# Patient Record
Sex: MALE | Race: BLACK OR AFRICAN AMERICAN | NOT HISPANIC OR LATINO | Employment: FULL TIME | URBAN - METROPOLITAN AREA
[De-identification: names, ages, dates, MRNs, and addresses within clinical notes are randomized per-mention and may not be internally consistent; named-entity substitution may affect disease eponyms.]

---

## 2018-03-05 ENCOUNTER — OFFICE VISIT (OUTPATIENT)
Dept: UROLOGY | Facility: CLINIC | Age: 68
End: 2018-03-05
Payer: MEDICARE

## 2018-03-05 VITALS
SYSTOLIC BLOOD PRESSURE: 160 MMHG | BODY MASS INDEX: 36.54 KG/M2 | WEIGHT: 261 LBS | DIASTOLIC BLOOD PRESSURE: 80 MMHG | HEIGHT: 71 IN

## 2018-03-05 DIAGNOSIS — C61 PROSTATE CANCER: Primary | ICD-10-CM

## 2018-03-05 LAB
BILIRUB SERPL-MCNC: ABNORMAL MG/DL
BLOOD URINE, POC: ABNORMAL
COLOR, POC UA: YELLOW
GLUCOSE UR QL STRIP: ABNORMAL
KETONES UR QL STRIP: ABNORMAL
LEUKOCYTE ESTERASE URINE, POC: ABNORMAL
NITRITE, POC UA: ABNORMAL
PH, POC UA: 6
PROTEIN, POC: ABNORMAL
SPECIFIC GRAVITY, POC UA: 1.01
UROBILINOGEN, POC UA: ABNORMAL

## 2018-03-05 PROCEDURE — 99204 OFFICE O/P NEW MOD 45 MIN: CPT | Mod: S$GLB,,, | Performed by: NURSE PRACTITIONER

## 2018-03-05 PROCEDURE — 81002 URINALYSIS NONAUTO W/O SCOPE: CPT | Mod: S$GLB,,, | Performed by: NURSE PRACTITIONER

## 2018-03-05 RX ORDER — MELOXICAM 15 MG/1
TABLET ORAL
COMMUNITY
Start: 2018-01-29

## 2018-03-05 RX ORDER — AMLODIPINE BESYLATE 5 MG/1
TABLET ORAL
COMMUNITY
Start: 2018-02-25

## 2018-03-05 RX ORDER — BENAZEPRIL HYDROCHLORIDE 40 MG/1
TABLET ORAL
COMMUNITY
Start: 2018-02-25

## 2018-03-05 NOTE — PROGRESS NOTES
"Subjective:      Deep Farah is a 67 y.o. male who was self-referred for evaluation of his elevated PSA.      Previous patient of Dr. Edmondson. The patient states that he was diagnosed with prostate cancer in 2004 and had his prostate removed followed by radiation. PSA is now 4.6. Denies symptoms of metastatic disease, but does report right knee pain (seeing ortho). Reports urinary urgency and daytime frequency but denies nocturia. Strong family history of prostate cancer.     Upon further search, limited records were found in "legacy documents." It appears the patient had Paulo 2+2 in 1/6 cores (PSA 18.6 at time of bx) with brachytherapy followed by XRT. MRI and bone scan in 2004 were negative.     The following portions of the patient's history were reviewed and updated as appropriate: allergies, current medications, past family history, past medical history, past social history, past surgical history and problem list.    Review of Systems  Constitutional: no fever or chills  ENT: no nasal congestion or sore throat  Respiratory: no cough or shortness of breath  Cardiovascular: no chest pain or palpitations  Gastrointestinal: no nausea or vomiting, tolerating diet  Genitourinary: as per HPI  Hematologic/Lymphatic: no easy bruising or lymphadenopathy  Musculoskeletal: no arthralgias or myalgias  Neurological: no seizures or tremors  Behavioral/Psych: no auditory or visual hallucinations     Objective:   Vitals: BP (!) 160/80 (BP Location: Left arm, Patient Position: Sitting, BP Method: X-Large (Automatic))   Ht 5' 11" (1.803 m)   Wt 118.4 kg (261 lb)   BMI 36.40 kg/m²     Physical Exam   General: alert and oriented, no acute distress  Head: normocephalic, atraumatic  Neck: supple, no lymphadenopathy, normal ROM, no masses  Respiratory: Symmetric expansion, non-labored breathing  Cardiovascular: regular rate and rhythm, nomal pulses, no peripheral edema  Abdomen: soft, non tender, non distended, no palpable " masses, no hernias, no hepatomegaly or splenomegaly  Genitourinary: note examined   Lymphatic: no inguinal nodes  Skin: normal coloration and turgor, no rashes, no suspicious skin lesions noted  Neuro: alert and oriented x3, no gross deficits  Psych: normal judgment and insight, normal mood/affect and non-anxious    Lab Review   Urinalysis demonstrates positive for red blood cells (5-10 dannie/mL), protein (trace)  Lab Results   Component Value Date    WBC 5.99 08/09/2015    HGB 11.6 (L) 08/09/2015    HCT 36.4 (L) 08/09/2015    MCV 85 08/09/2015     08/09/2015     Lab Results   Component Value Date    CREATININE 1.29 08/09/2015    BUN 24 (H) 08/09/2015     No results found for: PSA  Imaging   None    Assessment:     1. Prostate cancer      Plan:   Deep was seen today for new patient.    Diagnoses and all orders for this visit:    Prostate cancer  -     Basic metabolic panel; Future  -     NM Bone Scan Whole Body; Future  -     CT Abdomen Pelvis W Wo Contrast; Future    Plan:  --Bone scan and CT abdomen/pelvis  --Follow up with Dr. Calix or Dr. Panchal following imaging to determine plan of care

## 2022-01-10 ENCOUNTER — LAB VISIT (OUTPATIENT)
Dept: PRIMARY CARE CLINIC | Facility: OTHER | Age: 72
End: 2022-01-10
Attending: INTERNAL MEDICINE

## 2022-01-10 DIAGNOSIS — U07.1 COVID-19: Primary | ICD-10-CM

## 2022-01-10 LAB
CTP QC/QA: YES
SARS-COV-2 AG RESP QL IA.RAPID: NEGATIVE

## 2022-01-10 PROCEDURE — 87811 SARS-COV-2 COVID19 W/OPTIC: CPT

## 2022-01-10 NOTE — PROGRESS NOTES
Instructions for Patients with Confirmed or Suspected COVID-19    If you are awaiting your test result, you will either be called or it will be released to the patient portal.  If you have any questions about your test, please visit [email or web address] or call our COVID-19 information line at [phone].      Please isolate yourself at home.  You may leave home and/or return to work once the following conditions are met:    If you have symptoms and tested positive:   More than 5 days since symptoms first appeared AND   More than 24 hours fever free without medications AND       symptoms have improved   · For five days after ending isolation, masks are required.    If you had no symptoms but tested positive:   More than 5 days since the date of the first positive test. If you develop symptoms, then use the guidelines above  · For five days after ending isolation, masks are required.      Testing is not recommended if you are symptom free after completing isolation.

## 2023-07-01 ENCOUNTER — HOSPITAL ENCOUNTER (EMERGENCY)
Facility: HOSPITAL | Age: 73
Discharge: HOME | End: 2023-07-01
Attending: FAMILY MEDICINE
Payer: MEDICARE

## 2023-07-01 VITALS
OXYGEN SATURATION: 99 % | SYSTOLIC BLOOD PRESSURE: 195 MMHG | HEART RATE: 87 BPM | TEMPERATURE: 98 F | DIASTOLIC BLOOD PRESSURE: 97 MMHG | RESPIRATION RATE: 18 BRPM

## 2023-07-01 DIAGNOSIS — M10.9 GOUT, UNSPECIFIED CAUSE, UNSPECIFIED CHRONICITY, UNSPECIFIED SITE: ICD-10-CM

## 2023-07-01 DIAGNOSIS — R52 PAIN: ICD-10-CM

## 2023-07-01 DIAGNOSIS — M79.89 SWELLING OF LEFT HAND: Primary | ICD-10-CM

## 2023-07-01 LAB
ALBUMIN SERPL BCP-MCNC: 4 G/DL (ref 3.5–5.2)
ALP SERPL-CCNC: 81 U/L (ref 38–126)
ALT SERPL W/O P-5'-P-CCNC: 34 U/L (ref 10–44)
ANION GAP SERPL CALC-SCNC: 8 MMOL/L (ref 8–16)
AST SERPL-CCNC: 36 U/L (ref 15–46)
BASOPHILS # BLD AUTO: 0.03 K/UL (ref 0–0.2)
BASOPHILS NFR BLD: 0.3 % (ref 0–1.9)
BILIRUB SERPL-MCNC: 0.8 MG/DL (ref 0.1–1)
CALCIUM SERPL-MCNC: 8.9 MG/DL (ref 8.7–10.5)
CHLORIDE SERPL-SCNC: 103 MMOL/L (ref 95–110)
CO2 SERPL-SCNC: 29 MMOL/L (ref 23–29)
CREAT SERPL-MCNC: 1.35 MG/DL (ref 0.5–1.4)
DIFFERENTIAL METHOD: ABNORMAL
EOSINOPHIL # BLD AUTO: 0.1 K/UL (ref 0–0.5)
EOSINOPHIL NFR BLD: 1.4 % (ref 0–8)
ERYTHROCYTE [DISTWIDTH] IN BLOOD BY AUTOMATED COUNT: 12.4 % (ref 11.5–14.5)
EST. GFR  (NO RACE VARIABLE): 55.1 ML/MIN/1.73 M^2
GLUCOSE SERPL-MCNC: 92 MG/DL (ref 70–110)
HCT VFR BLD AUTO: 31.1 % (ref 40–54)
HGB BLD-MCNC: 9.9 G/DL (ref 14–18)
IMM GRANULOCYTES # BLD AUTO: 0.03 K/UL (ref 0–0.04)
IMM GRANULOCYTES NFR BLD AUTO: 0.3 % (ref 0–0.5)
LYMPHOCYTES # BLD AUTO: 0.9 K/UL (ref 1–4.8)
LYMPHOCYTES NFR BLD: 8.9 % (ref 18–48)
MCH RBC QN AUTO: 27.8 PG (ref 27–31)
MCHC RBC AUTO-ENTMCNC: 31.8 G/DL (ref 32–36)
MCV RBC AUTO: 87 FL (ref 82–98)
MONOCYTES # BLD AUTO: 1.4 K/UL (ref 0.3–1)
MONOCYTES NFR BLD: 14.2 % (ref 4–15)
NEUTROPHILS # BLD AUTO: 7.4 K/UL (ref 1.8–7.7)
NEUTROPHILS NFR BLD: 74.9 % (ref 38–73)
NRBC BLD-RTO: 0 /100 WBC
PLATELET # BLD AUTO: 167 K/UL (ref 150–450)
PMV BLD AUTO: 11.7 FL (ref 9.2–12.9)
POTASSIUM SERPL-SCNC: 3.9 MMOL/L (ref 3.5–5.1)
PROT SERPL-MCNC: 7.7 G/DL (ref 6–8.4)
RBC # BLD AUTO: 3.56 M/UL (ref 4.6–6.2)
SODIUM SERPL-SCNC: 140 MMOL/L (ref 136–145)
URATE SERPL-MCNC: 7.7 MG/DL (ref 3.4–7)
UUN UR-MCNC: 23 MG/DL (ref 2–20)
WBC # BLD AUTO: 9.84 K/UL (ref 3.9–12.7)

## 2023-07-01 PROCEDURE — 63600175 PHARM REV CODE 636 W HCPCS: Mod: ER | Performed by: FAMILY MEDICINE

## 2023-07-01 PROCEDURE — 80053 COMPREHEN METABOLIC PANEL: CPT | Mod: ER | Performed by: FAMILY MEDICINE

## 2023-07-01 PROCEDURE — 99285 EMERGENCY DEPT VISIT HI MDM: CPT | Mod: 25,ER

## 2023-07-01 PROCEDURE — 96372 THER/PROPH/DIAG INJ SC/IM: CPT | Performed by: FAMILY MEDICINE

## 2023-07-01 PROCEDURE — 25000003 PHARM REV CODE 250: Mod: ER | Performed by: FAMILY MEDICINE

## 2023-07-01 PROCEDURE — 84550 ASSAY OF BLOOD/URIC ACID: CPT | Performed by: FAMILY MEDICINE

## 2023-07-01 PROCEDURE — 85025 COMPLETE CBC W/AUTO DIFF WBC: CPT | Mod: ER | Performed by: FAMILY MEDICINE

## 2023-07-01 RX ORDER — DEXAMETHASONE SODIUM PHOSPHATE 4 MG/ML
4 INJECTION, SOLUTION INTRA-ARTICULAR; INTRALESIONAL; INTRAMUSCULAR; INTRAVENOUS; SOFT TISSUE
Status: COMPLETED | OUTPATIENT
Start: 2023-07-01 | End: 2023-07-01

## 2023-07-01 RX ORDER — HYDROCODONE BITARTRATE AND ACETAMINOPHEN 10; 325 MG/1; MG/1
1 TABLET ORAL
Status: COMPLETED | OUTPATIENT
Start: 2023-07-01 | End: 2023-07-01

## 2023-07-01 RX ORDER — DEXAMETHASONE SODIUM PHOSPHATE 4 MG/ML
4 INJECTION, SOLUTION INTRA-ARTICULAR; INTRALESIONAL; INTRAMUSCULAR; INTRAVENOUS; SOFT TISSUE
Status: DISCONTINUED | OUTPATIENT
Start: 2023-07-01 | End: 2023-07-01

## 2023-07-01 RX ORDER — HYDROCODONE BITARTRATE AND ACETAMINOPHEN 7.5; 325 MG/1; MG/1
1 TABLET ORAL EVERY 6 HOURS PRN
Qty: 12 TABLET | Refills: 0 | Status: SHIPPED | OUTPATIENT
Start: 2023-07-01

## 2023-07-01 RX ADMIN — HYDROCODONE BITARTRATE AND ACETAMINOPHEN 1 TABLET: 10; 325 TABLET ORAL at 03:07

## 2023-07-01 RX ADMIN — DEXAMETHASONE SODIUM PHOSPHATE 4 MG: 4 INJECTION, SOLUTION INTRA-ARTICULAR; INTRALESIONAL; INTRAMUSCULAR; INTRAVENOUS; SOFT TISSUE at 05:07

## 2023-07-01 NOTE — ED PROVIDER NOTES
Encounter Date: 7/1/2023       History     Chief Complaint   Patient presents with    Joint Swelling     I am having left hand swelling and pain when I woke up this morning. I had a defibrillator placed a week ago at [facility].      72-year-old male woke up with pain and swelling in his left wrist and continued to be swollen since yesterday.  He had defibrillator placed in his left chest.  And wears sling.  No chest pain or shortness of breath.  No swelling in elbow or upper chest area.  Dressing is clean with no bleeding or swelling.  Patient denies fever, cough or congestion.    The history is provided by the patient.   Review of patient's allergies indicates:   Allergen Reactions    Pcn [penicillins] Swelling     Past Medical History:   Diagnosis Date    Bell's palsy     Diverticulitis     Hypertension      Past Surgical History:   Procedure Laterality Date    CARDIAC DEFIBRILLATOR PLACEMENT      PROSTATE SURGERY       Family History   Problem Relation Age of Onset    Stroke Father     Hypertension Father     Hypertension Brother     Prostate cancer Brother     Prostate cancer Cousin      Social History     Tobacco Use    Smoking status: Former    Smokeless tobacco: Never   Substance Use Topics    Alcohol use: Yes     Comment: 2 beers in a week    Drug use: No     Review of Systems   Constitutional:  Negative for fever.   HENT:  Negative for sore throat.    Respiratory:  Negative for shortness of breath.    Cardiovascular:  Negative for chest pain.   Gastrointestinal:  Negative for nausea.   Genitourinary:  Negative for dysuria.   Musculoskeletal:  Negative for back pain.   Skin:  Negative for rash.   Neurological:  Negative for weakness.   Hematological:  Does not bruise/bleed easily.   All other systems reviewed and are negative.    Physical Exam     Initial Vitals [07/01/23 1341]   BP Pulse Resp Temp SpO2   (!) 176/81 92 16 99.1 °F (37.3 °C) 99 %      MAP       --         Physical Exam    Nursing note and vitals  reviewed.  Constitutional: Vital signs are normal. He appears well-developed and well-nourished. He is active. No distress.   HENT:   Head: Normocephalic.   Nose: Nose normal.   Mouth/Throat: Oropharynx is clear and moist and mucous membranes are normal.   Eyes: Conjunctivae, EOM and lids are normal.   Neck: Neck supple.   Normal range of motion.  Cardiovascular:  Normal rate, regular rhythm, S1 normal, S2 normal and normal heart sounds.           Pulmonary/Chest: Breath sounds normal. No respiratory distress. He has no wheezes. He has no rales.   Defibrillator placed in left chest with normal dressing.   Abdominal: Abdomen is soft. Bowel sounds are normal. He exhibits no distension. There is no abdominal tenderness. There is no rebound.   Musculoskeletal:         General: Normal range of motion.      Right upper arm: Normal.      Cervical back: Normal range of motion and neck supple.      Right lower leg: Normal.      Left lower leg: Normal.      Comments: Left wrist and hand swollen.  Tender at the wrist joint.    Normal ulnar and radial pulse.     Neurological: He is alert and oriented to person, place, and time. He has normal strength. GCS score is 15. GCS eye subscore is 4. GCS verbal subscore is 5. GCS motor subscore is 6.   Skin: Skin is warm. Capillary refill takes less than 2 seconds.   Psychiatric: He has a normal mood and affect. His speech is normal and behavior is normal. Thought content normal. Cognition and memory are normal.       ED Course   Procedures  Labs Reviewed   CBC W/ AUTO DIFFERENTIAL - Abnormal; Notable for the following components:       Result Value    RBC 3.56 (*)     Hemoglobin 9.9 (*)     Hematocrit 31.1 (*)     MCHC 31.8 (*)     Lymph # 0.9 (*)     Mono # 1.4 (*)     Gran % 74.9 (*)     Lymph % 8.9 (*)     All other components within normal limits   COMPREHENSIVE METABOLIC PANEL - Abnormal; Notable for the following components:    BUN 23 (*)     eGFR 55.1 (*)     All other components  within normal limits   URIC ACID - Abnormal; Notable for the following components:    Uric Acid 7.7 (*)     All other components within normal limits          Imaging Results              US Upper Extremity Veins Left (Final result)  Result time 07/01/23 15:55:50      Final result by Anita Patino MD (07/01/23 15:55:50)                   Impression:      Negative for DVT      Electronically signed by: Anita Patino  Date:    07/01/2023  Time:    15:55               Narrative:    EXAMINATION:  US UPPER EXTREMITY VEINS LEFT    CLINICAL HISTORY:  swelling;    TECHNIQUE:  Duplex and color flow Doppler evaluation and dynamic compression was performed of the left upper extremity veins.    COMPARISON:  None    FINDINGS:  Central veins: The internal jugular, subclavian, and axillary veins are patent and free of thrombus.    Arm veins: The brachial, basilic, and cephalic veins are patent and compressible.    Contralateral subclavian/internal jugular veins: The right subclavian and internal jugular veins are patent and free of thrombus.    Other findings: None.                                       X-Ray Hand 3 View Left (Final result)  Result time 07/01/23 14:12:15      Final result by VICKI Allen Sr., MD (07/01/23 14:12:15)                   Impression:      1. There is mild prominence of the soft tissue thickness dorsal and medial to the wrist.  There is mild prominence of the soft tissue thickness in the dorsum of the hand. This is characteristic of a cellulitis or soft tissue contusion.  2. There are findings characteristic of erosions in some of the carpal bones and the proximal portion of the 1st and 2nd metacarpals.  3. If additional imaging evaluation is clinically indicated, I recommend consideration of a nonemergent MRI examination of the left upper extremity without and with IV contrast.      Electronically signed by: Johnathan Allen MD  Date:    07/01/2023  Time:    14:12               Narrative:     EXAMINATION:  XR WRIST COMPLETE 3 VIEWS LEFT; XR HAND COMPLETE 3 VIEW LEFT    CLINICAL HISTORY:  Pain, unspecifiedpain;    COMPARISON:  None    FINDINGS:  There is no fracture. There is no dislocation.  There are findings characteristic of erosions in some of the carpal bones and the proximal portion of the 1st and 2nd metacarpals.  There is a mild amount of atherosclerosis.  There is mild prominence of the soft tissue thickness dorsal and medial to the wrist.  There is mild prominence of the soft tissue thickness in the dorsum of the hand.                                       X-Ray Wrist Complete Left (Final result)  Result time 07/01/23 14:12:15      Final result by VICKI Allen Sr., MD (07/01/23 14:12:15)                   Impression:      1. There is mild prominence of the soft tissue thickness dorsal and medial to the wrist.  There is mild prominence of the soft tissue thickness in the dorsum of the hand. This is characteristic of a cellulitis or soft tissue contusion.  2. There are findings characteristic of erosions in some of the carpal bones and the proximal portion of the 1st and 2nd metacarpals.  3. If additional imaging evaluation is clinically indicated, I recommend consideration of a nonemergent MRI examination of the left upper extremity without and with IV contrast.      Electronically signed by: Johnathan Allen MD  Date:    07/01/2023  Time:    14:12               Narrative:    EXAMINATION:  XR WRIST COMPLETE 3 VIEWS LEFT; XR HAND COMPLETE 3 VIEW LEFT    CLINICAL HISTORY:  Pain, unspecifiedpain;    COMPARISON:  None    FINDINGS:  There is no fracture. There is no dislocation.  There are findings characteristic of erosions in some of the carpal bones and the proximal portion of the 1st and 2nd metacarpals.  There is a mild amount of atherosclerosis.  There is mild prominence of the soft tissue thickness dorsal and medial to the wrist.  There is mild prominence of the soft tissue thickness in  the dorsum of the hand.                                       Medications   HYDROcodone-acetaminophen  mg per tablet 1 tablet (1 tablet Oral Given 7/1/23 1512)   dexAMETHasone injection 4 mg (4 mg Intramuscular Given 7/1/23 1711)     Medical Decision Making:   Initial Assessment:   Swelling to his left hand and wrist for last 2 days.  No injury.  Pain with movement of joints.  Ulnar and radial pulses normal.  Normal capillary refill.  Differential Diagnosis:   Dependent edema, arthritis, cellulitis, gout.  Independently Interpreted Test(s):   I have ordered and independently interpreted X-rays - see summary below.       <> Summary of X-Ray Reading(s): X-ray of hand and wrist with no acute fractures or dislocations.  Arthritis noted.  ED Management:  Patient is wearing sling and hanging his left hand out of the sling.  Possibility of dependent edema increased to hand.  Elevated uric acid possibility of gout.  Possibility of gouty arthritis.  Patient is treated with dexamethasone and hydrocodone.  NSAIDs contraindicated.  Continue hydrocodone for pain.- follow up with PCP for further evaluation and management.  Chronic hypertension asymptomatic.  Advised for prompt medication and medication management.  Low-salt diet and discuss with PCP.  Dressing to left chest wall status post pacemaker placement clean and dry.  No fever chills.  No shortness of breath.  No chest pains.  Follow-up cardiology as scheduled.  Advised to follow-up ED with any worsening pain or swelling in his left hand immediately.                          Clinical Impression:   Final diagnoses:  [R52] Pain  [M79.89] Swelling of left hand (Primary)  [M10.9] Gout, unspecified cause, unspecified chronicity, unspecified site        ED Disposition Condition    Discharge Stable          ED Prescriptions       Medication Sig Dispense Start Date End Date Auth. Provider    HYDROcodone-acetaminophen (NORCO) 7.5-325 mg per tablet Take 1 tablet by mouth  every 6 (six) hours as needed for Pain. 12 tablet 7/1/2023 -- Ilya Leal MD          Follow-up Information       Follow up With Specialties Details Why Contact Info    Jeferson Andujar MD Internal Medicine   [id number]  [facility] [address]  [phone]               Ilya Leal MD  07/03/23 0622

## 2024-06-20 ENCOUNTER — HOSPITAL ENCOUNTER (INPATIENT)
Facility: HOSPITAL | Age: 74
LOS: 5 days | Discharge: HOME HEALTH | DRG: 871 | End: 2024-06-25
Attending: EMERGENCY MEDICINE | Admitting: INTERNAL MEDICINE
Payer: MEDICARE

## 2024-06-20 DIAGNOSIS — M62.82 NON-TRAUMATIC RHABDOMYOLYSIS: ICD-10-CM

## 2024-06-20 DIAGNOSIS — I50.20 HFREF (HEART FAILURE WITH REDUCED EJECTION FRACTION): ICD-10-CM

## 2024-06-20 DIAGNOSIS — N17.9 ACUTE RENAL FAILURE DUE TO RHABDOMYOLYSIS: Primary | ICD-10-CM

## 2024-06-20 DIAGNOSIS — I50.22 CHRONIC SYSTOLIC CONGESTIVE HEART FAILURE, NYHA CLASS 2: ICD-10-CM

## 2024-06-20 DIAGNOSIS — R19.7 DIARRHEA, UNSPECIFIED TYPE: ICD-10-CM

## 2024-06-20 DIAGNOSIS — R00.0 TACHYCARDIA: ICD-10-CM

## 2024-06-20 DIAGNOSIS — E80.6 HYPERBILIRUBINEMIA: ICD-10-CM

## 2024-06-20 DIAGNOSIS — R73.9 HYPERGLYCEMIA: ICD-10-CM

## 2024-06-20 DIAGNOSIS — D64.9 NORMOCYTIC ANEMIA: ICD-10-CM

## 2024-06-20 DIAGNOSIS — I25.10 CORONARY ARTERY DISEASE INVOLVING NATIVE CORONARY ARTERY OF NATIVE HEART WITHOUT ANGINA PECTORIS: ICD-10-CM

## 2024-06-20 DIAGNOSIS — M62.82 ACUTE RENAL FAILURE DUE TO RHABDOMYOLYSIS: Primary | ICD-10-CM

## 2024-06-20 DIAGNOSIS — R07.9 CHEST PAIN: ICD-10-CM

## 2024-06-20 DIAGNOSIS — R50.9 FEVER, UNSPECIFIED FEVER CAUSE: ICD-10-CM

## 2024-06-20 DIAGNOSIS — I48.0 PAROXYSMAL ATRIAL FIBRILLATION: Chronic | ICD-10-CM

## 2024-06-20 DIAGNOSIS — E87.20 METABOLIC ACIDOSIS: ICD-10-CM

## 2024-06-20 DIAGNOSIS — N17.9 AKI (ACUTE KIDNEY INJURY): ICD-10-CM

## 2024-06-20 DIAGNOSIS — E03.9 HYPOTHYROIDISM, UNSPECIFIED TYPE: ICD-10-CM

## 2024-06-20 DIAGNOSIS — E86.0 DEHYDRATION: ICD-10-CM

## 2024-06-20 DIAGNOSIS — N18.31 CKD STAGE 3A, GFR 45-59 ML/MIN: ICD-10-CM

## 2024-06-20 DIAGNOSIS — J18.9 PNEUMONIA OF LEFT LOWER LOBE DUE TO INFECTIOUS ORGANISM: ICD-10-CM

## 2024-06-20 DIAGNOSIS — R74.01 TRANSAMINITIS: ICD-10-CM

## 2024-06-20 PROBLEM — M1A.9XX0 CHRONIC GOUT: Status: ACTIVE | Noted: 2024-06-20

## 2024-06-20 PROBLEM — E78.5 HYPERLIPIDEMIA: Status: ACTIVE | Noted: 2024-06-20

## 2024-06-20 PROBLEM — G51.0 LEFT-SIDED BELL'S PALSY: Status: ACTIVE | Noted: 2024-06-20

## 2024-06-20 PROBLEM — Z98.890 HISTORY OF CARDIAC RADIOFREQUENCY ABLATION: Chronic | Status: ACTIVE | Noted: 2023-04-24

## 2024-06-20 PROBLEM — I10 HYPERTENSION: Status: ACTIVE | Noted: 2024-06-20

## 2024-06-20 PROBLEM — Z79.01 CHRONIC ANTICOAGULATION: Chronic | Status: ACTIVE | Noted: 2023-04-24

## 2024-06-20 LAB
ALBUMIN SERPL BCP-MCNC: 3.2 G/DL (ref 3.5–5.2)
ALP SERPL-CCNC: 87 U/L (ref 55–135)
ALT SERPL W/O P-5'-P-CCNC: 126 U/L (ref 10–44)
ANION GAP SERPL CALC-SCNC: 15 MMOL/L (ref 8–16)
AST SERPL-CCNC: 433 U/L (ref 10–40)
BASOPHILS # BLD AUTO: 0.03 K/UL (ref 0–0.2)
BASOPHILS NFR BLD: 0.2 % (ref 0–1.9)
BILIRUB SERPL-MCNC: 1.6 MG/DL (ref 0.1–1)
BUN SERPL-MCNC: 36 MG/DL (ref 8–23)
CALCIUM SERPL-MCNC: 9.9 MG/DL (ref 8.7–10.5)
CHLORIDE SERPL-SCNC: 101 MMOL/L (ref 95–110)
CK SERPL-CCNC: ABNORMAL U/L (ref 20–200)
CO2 SERPL-SCNC: 21 MMOL/L (ref 23–29)
CREAT SERPL-MCNC: 2.8 MG/DL (ref 0.5–1.4)
DIFFERENTIAL METHOD BLD: ABNORMAL
EOSINOPHIL # BLD AUTO: 0.1 K/UL (ref 0–0.5)
EOSINOPHIL NFR BLD: 0.5 % (ref 0–8)
ERYTHROCYTE [DISTWIDTH] IN BLOOD BY AUTOMATED COUNT: 13.8 % (ref 11.5–14.5)
EST. GFR  (NO RACE VARIABLE): 23 ML/MIN/1.73 M^2
ESTIMATED AVG GLUCOSE: 97 MG/DL (ref 68–131)
FERRITIN SERPL-MCNC: 468 NG/ML (ref 20–300)
GLUCOSE SERPL-MCNC: 174 MG/DL (ref 70–110)
HBA1C MFR BLD: 5 % (ref 4–5.6)
HCT VFR BLD AUTO: 36.7 % (ref 40–54)
HGB BLD-MCNC: 11.9 G/DL (ref 14–18)
IMM GRANULOCYTES # BLD AUTO: 0.15 K/UL (ref 0–0.04)
IMM GRANULOCYTES NFR BLD AUTO: 0.8 % (ref 0–0.5)
LYMPHOCYTES # BLD AUTO: 0.4 K/UL (ref 1–4.8)
LYMPHOCYTES NFR BLD: 2.3 % (ref 18–48)
MCH RBC QN AUTO: 27.2 PG (ref 27–31)
MCHC RBC AUTO-ENTMCNC: 32.4 G/DL (ref 32–36)
MCV RBC AUTO: 84 FL (ref 82–98)
MONOCYTES # BLD AUTO: 1 K/UL (ref 0.3–1)
MONOCYTES NFR BLD: 4.9 % (ref 4–15)
NEUTROPHILS # BLD AUTO: 17.7 K/UL (ref 1.8–7.7)
NEUTROPHILS NFR BLD: 91.3 % (ref 38–73)
NRBC BLD-RTO: 0 /100 WBC
PLATELET # BLD AUTO: 194 K/UL (ref 150–450)
PMV BLD AUTO: 12.2 FL (ref 9.2–12.9)
POTASSIUM SERPL-SCNC: 3.8 MMOL/L (ref 3.5–5.1)
PROT SERPL-MCNC: 8.8 G/DL (ref 6–8.4)
RBC # BLD AUTO: 4.38 M/UL (ref 4.6–6.2)
SODIUM SERPL-SCNC: 137 MMOL/L (ref 136–145)
T4 FREE SERPL-MCNC: 1.1 NG/DL (ref 0.71–1.51)
TSH SERPL DL<=0.005 MIU/L-ACNC: 0.47 UIU/ML (ref 0.4–4)
WBC # BLD AUTO: 19.32 K/UL (ref 3.9–12.7)

## 2024-06-20 PROCEDURE — 96360 HYDRATION IV INFUSION INIT: CPT

## 2024-06-20 PROCEDURE — 82746 ASSAY OF FOLIC ACID SERUM: CPT | Performed by: STUDENT IN AN ORGANIZED HEALTH CARE EDUCATION/TRAINING PROGRAM

## 2024-06-20 PROCEDURE — 25000003 PHARM REV CODE 250: Performed by: EMERGENCY MEDICINE

## 2024-06-20 PROCEDURE — 99900035 HC TECH TIME PER 15 MIN (STAT)

## 2024-06-20 PROCEDURE — 84443 ASSAY THYROID STIM HORMONE: CPT | Performed by: STUDENT IN AN ORGANIZED HEALTH CARE EDUCATION/TRAINING PROGRAM

## 2024-06-20 PROCEDURE — 25000003 PHARM REV CODE 250: Performed by: STUDENT IN AN ORGANIZED HEALTH CARE EDUCATION/TRAINING PROGRAM

## 2024-06-20 PROCEDURE — 96361 HYDRATE IV INFUSION ADD-ON: CPT

## 2024-06-20 PROCEDURE — 84439 ASSAY OF FREE THYROXINE: CPT | Performed by: STUDENT IN AN ORGANIZED HEALTH CARE EDUCATION/TRAINING PROGRAM

## 2024-06-20 PROCEDURE — 99291 CRITICAL CARE FIRST HOUR: CPT | Mod: 25

## 2024-06-20 PROCEDURE — 63600175 PHARM REV CODE 636 W HCPCS: Performed by: STUDENT IN AN ORGANIZED HEALTH CARE EDUCATION/TRAINING PROGRAM

## 2024-06-20 PROCEDURE — 93005 ELECTROCARDIOGRAM TRACING: CPT

## 2024-06-20 PROCEDURE — 93010 ELECTROCARDIOGRAM REPORT: CPT | Mod: ,,, | Performed by: INTERNAL MEDICINE

## 2024-06-20 PROCEDURE — 82607 VITAMIN B-12: CPT | Performed by: STUDENT IN AN ORGANIZED HEALTH CARE EDUCATION/TRAINING PROGRAM

## 2024-06-20 PROCEDURE — 80053 COMPREHEN METABOLIC PANEL: CPT | Performed by: EMERGENCY MEDICINE

## 2024-06-20 PROCEDURE — 11000001 HC ACUTE MED/SURG PRIVATE ROOM

## 2024-06-20 PROCEDURE — 82550 ASSAY OF CK (CPK): CPT | Performed by: EMERGENCY MEDICINE

## 2024-06-20 PROCEDURE — 83540 ASSAY OF IRON: CPT | Performed by: STUDENT IN AN ORGANIZED HEALTH CARE EDUCATION/TRAINING PROGRAM

## 2024-06-20 PROCEDURE — 82728 ASSAY OF FERRITIN: CPT | Performed by: STUDENT IN AN ORGANIZED HEALTH CARE EDUCATION/TRAINING PROGRAM

## 2024-06-20 PROCEDURE — 85025 COMPLETE CBC W/AUTO DIFF WBC: CPT | Performed by: EMERGENCY MEDICINE

## 2024-06-20 PROCEDURE — 83036 HEMOGLOBIN GLYCOSYLATED A1C: CPT | Performed by: STUDENT IN AN ORGANIZED HEALTH CARE EDUCATION/TRAINING PROGRAM

## 2024-06-20 RX ORDER — LEVOTHYROXINE SODIUM 150 UG/1
150 TABLET ORAL
COMMUNITY
Start: 2024-05-12

## 2024-06-20 RX ORDER — VALSARTAN 320 MG/1
320 TABLET ORAL DAILY
Status: ON HOLD | COMMUNITY
Start: 2024-04-08 | End: 2024-06-24

## 2024-06-20 RX ORDER — SPIRONOLACTONE 25 MG/1
25 TABLET ORAL DAILY
Status: ON HOLD | COMMUNITY
Start: 2024-05-12 | End: 2024-06-25

## 2024-06-20 RX ORDER — NALOXONE HCL 0.4 MG/ML
0.02 VIAL (ML) INJECTION
Status: DISCONTINUED | OUTPATIENT
Start: 2024-06-20 | End: 2024-06-25 | Stop reason: HOSPADM

## 2024-06-20 RX ORDER — SODIUM CHLORIDE, SODIUM LACTATE, POTASSIUM CHLORIDE, CALCIUM CHLORIDE 600; 310; 30; 20 MG/100ML; MG/100ML; MG/100ML; MG/100ML
INJECTION, SOLUTION INTRAVENOUS CONTINUOUS
Status: ACTIVE | OUTPATIENT
Start: 2024-06-20 | End: 2024-06-23

## 2024-06-20 RX ORDER — NIFEDIPINE 30 MG/1
30 TABLET, EXTENDED RELEASE ORAL DAILY
Status: DISCONTINUED | OUTPATIENT
Start: 2024-06-20 | End: 2024-06-25 | Stop reason: HOSPADM

## 2024-06-20 RX ORDER — CARVEDILOL 6.25 MG/1
6.25 TABLET ORAL 2 TIMES DAILY
COMMUNITY
Start: 2024-04-08

## 2024-06-20 RX ORDER — APIXABAN 5 MG/1
5 TABLET, FILM COATED ORAL 2 TIMES DAILY
COMMUNITY

## 2024-06-20 RX ORDER — GLUCAGON 1 MG
1 KIT INJECTION
Status: DISCONTINUED | OUTPATIENT
Start: 2024-06-20 | End: 2024-06-25 | Stop reason: HOSPADM

## 2024-06-20 RX ORDER — TAMSULOSIN HYDROCHLORIDE 0.4 MG/1
0.4 CAPSULE ORAL DAILY
Status: DISCONTINUED | OUTPATIENT
Start: 2024-06-21 | End: 2024-06-20

## 2024-06-20 RX ORDER — IBUPROFEN 200 MG
16 TABLET ORAL
Status: DISCONTINUED | OUTPATIENT
Start: 2024-06-20 | End: 2024-06-25 | Stop reason: HOSPADM

## 2024-06-20 RX ORDER — LIDOCAINE 50 MG/G
1 PATCH TOPICAL DAILY PRN
Status: DISCONTINUED | OUTPATIENT
Start: 2024-06-20 | End: 2024-06-25 | Stop reason: HOSPADM

## 2024-06-20 RX ORDER — ASPIRIN 325 MG/1
1 TABLET, FILM COATED ORAL DAILY
COMMUNITY

## 2024-06-20 RX ORDER — ROSUVASTATIN CALCIUM 5 MG/1
5 TABLET, COATED ORAL DAILY
Status: ON HOLD | COMMUNITY
Start: 2024-05-12 | End: 2024-06-24

## 2024-06-20 RX ORDER — POTASSIUM CHLORIDE 750 MG/1
10 TABLET, EXTENDED RELEASE ORAL DAILY
COMMUNITY

## 2024-06-20 RX ORDER — CARVEDILOL 6.25 MG/1
6.25 TABLET ORAL 2 TIMES DAILY
Status: DISCONTINUED | OUTPATIENT
Start: 2024-06-20 | End: 2024-06-25 | Stop reason: HOSPADM

## 2024-06-20 RX ORDER — TAMSULOSIN HYDROCHLORIDE 0.4 MG/1
0.4 CAPSULE ORAL DAILY
Status: ON HOLD | COMMUNITY
End: 2024-06-24

## 2024-06-20 RX ORDER — SODIUM CHLORIDE 0.9 % (FLUSH) 0.9 %
10 SYRINGE (ML) INJECTION EVERY 12 HOURS PRN
Status: DISCONTINUED | OUTPATIENT
Start: 2024-06-20 | End: 2024-06-25 | Stop reason: HOSPADM

## 2024-06-20 RX ORDER — IBUPROFEN 200 MG
24 TABLET ORAL
Status: DISCONTINUED | OUTPATIENT
Start: 2024-06-20 | End: 2024-06-25 | Stop reason: HOSPADM

## 2024-06-20 RX ORDER — TIZANIDINE 2 MG/1
4 TABLET ORAL EVERY 8 HOURS PRN
Status: DISCONTINUED | OUTPATIENT
Start: 2024-06-20 | End: 2024-06-25 | Stop reason: HOSPADM

## 2024-06-20 RX ORDER — ACETAMINOPHEN 325 MG/1
650 TABLET ORAL EVERY 6 HOURS PRN
Status: DISCONTINUED | OUTPATIENT
Start: 2024-06-20 | End: 2024-06-21

## 2024-06-20 RX ORDER — PAROXETINE 10 MG/1
10 TABLET, FILM COATED ORAL EVERY MORNING
COMMUNITY

## 2024-06-20 RX ORDER — AMOXICILLIN 250 MG
1 CAPSULE ORAL NIGHTLY PRN
Status: DISCONTINUED | OUTPATIENT
Start: 2024-06-20 | End: 2024-06-25 | Stop reason: HOSPADM

## 2024-06-20 RX ORDER — OXYCODONE HYDROCHLORIDE 5 MG/1
5 TABLET ORAL EVERY 6 HOURS PRN
Status: DISCONTINUED | OUTPATIENT
Start: 2024-06-20 | End: 2024-06-25 | Stop reason: HOSPADM

## 2024-06-20 RX ORDER — PAROXETINE 10 MG/1
10 TABLET, FILM COATED ORAL EVERY MORNING
Status: DISCONTINUED | OUTPATIENT
Start: 2024-06-21 | End: 2024-06-25 | Stop reason: HOSPADM

## 2024-06-20 RX ORDER — OXYBUTYNIN CHLORIDE 10 MG/1
10 TABLET, EXTENDED RELEASE ORAL DAILY
Status: ON HOLD | COMMUNITY
Start: 2024-01-03 | End: 2024-06-24 | Stop reason: HOSPADM

## 2024-06-20 RX ORDER — POLYETHYLENE GLYCOL 3350 17 G/17G
17 POWDER, FOR SOLUTION ORAL DAILY PRN
Status: DISCONTINUED | OUTPATIENT
Start: 2024-06-20 | End: 2024-06-25 | Stop reason: HOSPADM

## 2024-06-20 RX ORDER — LEVOTHYROXINE SODIUM 75 UG/1
150 TABLET ORAL
Status: DISCONTINUED | OUTPATIENT
Start: 2024-06-21 | End: 2024-06-25 | Stop reason: HOSPADM

## 2024-06-20 RX ORDER — ALLOPURINOL 100 MG/1
100 TABLET ORAL DAILY
COMMUNITY
Start: 2024-05-14

## 2024-06-20 RX ORDER — TAMSULOSIN HYDROCHLORIDE 0.4 MG/1
0.4 CAPSULE ORAL NIGHTLY
Status: DISCONTINUED | OUTPATIENT
Start: 2024-06-20 | End: 2024-06-25 | Stop reason: HOSPADM

## 2024-06-20 RX ADMIN — SODIUM CHLORIDE 1000 ML: 9 INJECTION, SOLUTION INTRAVENOUS at 06:06

## 2024-06-20 RX ADMIN — APIXABAN 5 MG: 5 TABLET, FILM COATED ORAL at 09:06

## 2024-06-20 RX ADMIN — NIFEDIPINE 30 MG: 30 TABLET, FILM COATED, EXTENDED RELEASE ORAL at 09:06

## 2024-06-20 RX ADMIN — CARVEDILOL 6.25 MG: 6.25 TABLET, FILM COATED ORAL at 09:06

## 2024-06-20 RX ADMIN — TAMSULOSIN HYDROCHLORIDE 0.4 MG: 0.4 CAPSULE ORAL at 09:06

## 2024-06-20 RX ADMIN — SODIUM CHLORIDE, POTASSIUM CHLORIDE, SODIUM LACTATE AND CALCIUM CHLORIDE: 600; 310; 30; 20 INJECTION, SOLUTION INTRAVENOUS at 09:06

## 2024-06-20 NOTE — ED NOTES
Pt presents to Ed via EMS and pts neighbor informed them that the found pt sitting out in the sun and on the ground. They also states that pt was out yesterday in the sun an he had passed out and was found on the ground.

## 2024-06-20 NOTE — Clinical Note
Diagnosis: Acute renal failure due to rhabdomyolysis [[id number]]   Future Attending Provider: RASHAD AYERS [[id number]]   Is the patient being sent to ED Observation?: No

## 2024-06-20 NOTE — ED PROVIDER NOTES
Encounter Date: 6/20/2024       History     Chief Complaint   Patient presents with    Altered Mental Status     BIB EMS for AMS, patient out in sun on yesterday, when he fell/passed out.  Out in sun today, neighbor found patient on ground.  Hx of a pacemaker placement and A-fib Original 's  HR ranges from  while in ambulance.     The patient is a 73-year-old male who was outside in his garden and he fell out of his chair onto the ground was unable to get back up again.  He was on the ground for several hours before a neighbor found him.  He states it was too hot today and he was outside for severlal hours before the syncope.      Review of patient's allergies indicates:   Allergen Reactions    Pcn [penicillins] Swelling     Past Medical History:   Diagnosis Date    Bell's palsy     Diverticulitis     Hypertension      Past Surgical History:   Procedure Laterality Date    CARDIAC DEFIBRILLATOR PLACEMENT      PROSTATE SURGERY       Family History   Problem Relation Name Age of Onset    Stroke Father      Hypertension Father      Hypertension Brother      Prostate cancer Brother      Prostate cancer Cousin       Social History     Tobacco Use    Smoking status: Former    Smokeless tobacco: Never   Substance Use Topics    Alcohol use: Yes     Comment: 2 beers in a week    Drug use: No     Review of Systems   All other systems reviewed and are negative.      Physical Exam     Initial Vitals [06/20/24 1610]   BP Pulse Resp Temp SpO2   (!) 168/86 (!) 128 20 99.9 °F (37.7 °C) 98 %      MAP       --         Physical Exam    Nursing note and vitals reviewed.  Constitutional: He appears well-developed and well-nourished.   HENT:   Head: Normocephalic and atraumatic.   Eyes: EOM are normal. Pupils are equal, round, and reactive to light.   Neck: Neck supple.   Normal range of motion.  Cardiovascular:  Normal rate, regular rhythm, normal heart sounds and intact distal pulses.           Pulmonary/Chest: Breath sounds  normal.   Abdominal: Abdomen is soft. Bowel sounds are normal. He exhibits no distension. There is no abdominal tenderness. There is no rebound and no guarding.   Musculoskeletal:         General: No edema. Normal range of motion.      Cervical back: Normal range of motion and neck supple.     Neurological: He is alert and oriented to person, place, and time.   Skin: Skin is warm and dry.   Psychiatric: He has a normal mood and affect. His behavior is normal. Judgment and thought content normal.         ED Course   Critical Care    Date/Time: 6/20/2024 7:24 PM    Performed by: Eliana Ovalle MD  Authorized by: Eliana Ovalle MD  Direct patient critical care time: 5 minutes  Additional history critical care time: 5 minutes  Ordering / reviewing critical care time: 10 minutes  Documentation critical care time: 5 minutes  Consulting other physicians critical care time: 5 minutes  Consult with family critical care time: 5 minutes  Other critical care time: 5 minutes  Total critical care time (exclusive of procedural time) : 40 minutes  Critical care time was exclusive of separately billable procedures and treating other patients.  Critical care was necessary to treat or prevent imminent or life-threatening deterioration of the following conditions: dehydration and renal failure.  Critical care was time spent personally by me on the following activities: development of treatment plan with patient or surrogate, discussions with consultants, evaluation of patient's response to treatment, examination of patient, obtaining history from patient or surrogate, ordering and performing treatments and interventions, ordering and review of laboratory studies, pulse oximetry and re-evaluation of patient's condition.        Labs Reviewed   CBC W/ AUTO DIFFERENTIAL - Abnormal; Notable for the following components:       Result Value    WBC 19.32 (*)     RBC 4.38 (*)     Hemoglobin 11.9 (*)     Hematocrit 36.7 (*)     Immature  Granulocytes 0.8 (*)     Gran # (ANC) 17.7 (*)     Immature Grans (Abs) 0.15 (*)     Lymph # 0.4 (*)     Gran % 91.3 (*)     Lymph % 2.3 (*)     All other components within normal limits   COMPREHENSIVE METABOLIC PANEL - Abnormal; Notable for the following components:    CO2 21 (*)     Glucose 174 (*)     BUN 36 (*)     Creatinine 2.8 (*)     Total Protein 8.8 (*)     Albumin 3.2 (*)     Total Bilirubin 1.6 (*)      (*)      (*)     eGFR 23 (*)     All other components within normal limits   CK - Abnormal; Notable for the following components:    CPK 32662 (*)     All other components within normal limits   URINALYSIS, REFLEX TO URINE CULTURE          Imaging Results              CT Head Without Contrast (Final result)  Result time 06/20/24 18:59:32      Final result by Rhett Newell MD (06/20/24 18:59:32)                   Impression:      Negative CT of the brain for acute hemorrhage, mass or infarction.    Involutional and chronic small vessel changes.    Multifocal cervical spondylosis and stenosis appearing chronic.    No evidence of acute cervical fracture, subluxation or hematoma.      Electronically signed by: Rhett Newell  Date:    06/20/2024  Time:    18:59               Narrative:    EXAMINATION:  CT HEAD WITHOUT CONTRAST; CT CERVICAL SPINE WITHOUT CONTRAST    CLINICAL HISTORY:  Head trauma, minor (Age >= 65y);; Neck trauma (Age >= 65y);    TECHNIQUE:  Low dose axial images were obtained through the head and cervical spine.  Coronal and sagittal reformations were also performed. Contrast was not administered.    COMPARISON:  None.    FINDINGS:  BRAIN:    Brain parenchyma appears intact with generalized low attenuation in the periventricular and deep white matter.  There is normal gray-white matter differentiation.  No mass, mass effect or pathologic fluid collection.    Ventricles and basilar cisterns appear appropriate.    Calvarium intact.  Sinuses are clear with no significant  middle ear or mastoid opacity.  Orbits and orbital contents unremarkable.    CERVICAL SPINE:    Generalized cervical spondylosis most severe at C5-6 and C6-7 with osteophytes and disc space narrowing.  Straightening of cervical lordosis.  Multifocal facet arthropathy some of which demonstrate erosive changes especially on the right at C2-3.  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  No fracture or subluxation.  No hematoma.  The visceral spaces of the neck appear unremarkable.    The cervical cranial and cervicothoracic junctions are maintained.    .                                       CT Cervical Spine Without Contrast (Final result)  Result time 06/20/24 18:59:32      Final result by Rhett Newell MD (06/20/24 18:59:32)                   Impression:      Negative CT of the brain for acute hemorrhage, mass or infarction.    Involutional and chronic small vessel changes.    Multifocal cervical spondylosis and stenosis appearing chronic.    No evidence of acute cervical fracture, subluxation or hematoma.      Electronically signed by: Rhett Newell  Date:    06/20/2024  Time:    18:59               Narrative:    EXAMINATION:  CT HEAD WITHOUT CONTRAST; CT CERVICAL SPINE WITHOUT CONTRAST    CLINICAL HISTORY:  Head trauma, minor (Age >= 65y);; Neck trauma (Age >= 65y);    TECHNIQUE:  Low dose axial images were obtained through the head and cervical spine.  Coronal and sagittal reformations were also performed. Contrast was not administered.    COMPARISON:  None.    FINDINGS:  BRAIN:    Brain parenchyma appears intact with generalized low attenuation in the periventricular and deep white matter.  There is normal gray-white matter differentiation.  No mass, mass effect or pathologic fluid collection.    Ventricles and basilar cisterns appear appropriate.    Calvarium intact.  Sinuses are clear with no significant middle ear or mastoid  opacity.  Orbits and orbital contents unremarkable.    CERVICAL SPINE:    Generalized cervical spondylosis most severe at C5-6 and C6-7 with osteophytes and disc space narrowing.  Straightening of cervical lordosis.  Multifocal facet arthropathy some of which demonstrate erosive changes especially on the right at C2-3.  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  No fracture or subluxation.  No hematoma.  The visceral spaces of the neck appear unremarkable.    The cervical cranial and cervicothoracic junctions are maintained.    .                                       Medications   sodium chloride 0.9% bolus 1,000 mL 1,000 mL (1,000 mLs Intravenous New Bag 6/20/24 1825)     Medical Decision Making  Differential Diagnosis includes, but is not limited to:  Arrhythmia, aortic dissection, MI/unstable angina, PE, cardiogenic shock, CHF, CVA/TIA, intracranial lesion/mass, seizure, perforated viscous, ruptured AAA, orthostatic hypotension, vasovagal episode, anemia, dehydration, medication reaction, intentional overdose     MDM:  The patient is a 73-year-old male who was outside in the heat today and had a syncopal episode and fell out of his chair.  He denies any head injury however I will check a CT head and neck regardless.  His oral temp is 99.9°, he denies being sick recently.  His heart rate is 128.  He will be given IV fluids, p.o. fluids and I will check labs including a CPK.    Amount and/or Complexity of Data Reviewed  Labs: ordered. Decision-making details documented in ED Course.  Radiology: ordered. Decision-making details documented in ED Course.    Risk  Decision regarding hospitalization.               ED Course as of 06/20/24 1925   Thu Jun 20, 2024   1906 CT Cervical Spine Without Contrast [ST]   1906 CT Head Without Contrast     Impression:     Negative CT of the brain for acute hemorrhage, mass or infarction.     Involutional and  chronic small vessel changes.     Multifocal cervical spondylosis and stenosis appearing chronic.     No evidence of acute cervical fracture, subluxation or hematoma.   [ST]   1906 CBC auto differential(!) [ST]   1906 Comprehensive metabolic panel(!) [ST]      ED Course User Index  [ST] Eliana Ovalle MD                           Clinical Impression:  Final diagnoses:  [N17.9] DICK (acute kidney injury) (Primary)  [M62.82] Non-traumatic rhabdomyolysis  [E86.0] Dehydration          ED Disposition Condition    Admit Stable                Eliana Ovalle MD  06/20/24 1925

## 2024-06-21 PROBLEM — R50.9 FEVER: Status: ACTIVE | Noted: 2024-06-21

## 2024-06-21 PROBLEM — D64.9 NORMOCYTIC ANEMIA: Status: ACTIVE | Noted: 2024-06-21

## 2024-06-21 PROBLEM — E80.6 HYPERBILIRUBINEMIA: Status: ACTIVE | Noted: 2024-06-21

## 2024-06-21 PROBLEM — N18.31 CKD STAGE 3A, GFR 45-59 ML/MIN: Status: ACTIVE | Noted: 2024-06-21

## 2024-06-21 LAB
ADENOVIRUS: NOT DETECTED
ALBUMIN SERPL BCP-MCNC: 2.5 G/DL (ref 3.5–5.2)
ALP SERPL-CCNC: 68 U/L (ref 55–135)
ALT SERPL W/O P-5'-P-CCNC: 112 U/L (ref 10–44)
ANION GAP SERPL CALC-SCNC: 11 MMOL/L (ref 8–16)
ANION GAP SERPL CALC-SCNC: 11 MMOL/L (ref 8–16)
ANION GAP SERPL CALC-SCNC: 14 MMOL/L (ref 8–16)
ANION GAP SERPL CALC-SCNC: 15 MMOL/L (ref 8–16)
APICAL FOUR CHAMBER EJECTION FRACTION: 37 %
APICAL TWO CHAMBER EJECTION FRACTION: 30 %
ASCENDING AORTA: 3.01 CM
AST SERPL-CCNC: 377 U/L (ref 10–40)
AV INDEX (PROSTH): 0.84
AV MEAN GRADIENT: 10 MMHG
AV PEAK GRADIENT: 14 MMHG
AV VALVE AREA BY VELOCITY RATIO: 2.16 CM²
AV VALVE AREA: 2.63 CM²
AV VELOCITY RATIO: 0.7
BACTERIA #/AREA URNS HPF: ABNORMAL /HPF
BASOPHILS # BLD AUTO: 0.04 K/UL (ref 0–0.2)
BASOPHILS NFR BLD: 0.3 % (ref 0–1.9)
BILIRUB SERPL-MCNC: 1.2 MG/DL (ref 0.1–1)
BILIRUB UR QL STRIP: NEGATIVE
BORDETELLA PARAPERTUSSIS (IS1001): NOT DETECTED
BORDETELLA PERTUSSIS (PTXP): NOT DETECTED
BSA FOR ECHO PROCEDURE: 2.29 M2
BUN SERPL-MCNC: 39 MG/DL (ref 8–23)
BUN SERPL-MCNC: 41 MG/DL (ref 8–23)
BUN SERPL-MCNC: 41 MG/DL (ref 8–23)
BUN SERPL-MCNC: 46 MG/DL (ref 8–23)
CALCIUM SERPL-MCNC: 8.6 MG/DL (ref 8.7–10.5)
CALCIUM SERPL-MCNC: 8.6 MG/DL (ref 8.7–10.5)
CALCIUM SERPL-MCNC: 9 MG/DL (ref 8.7–10.5)
CALCIUM SERPL-MCNC: 9.3 MG/DL (ref 8.7–10.5)
CHLAMYDIA PNEUMONIAE: NOT DETECTED
CHLORIDE SERPL-SCNC: 101 MMOL/L (ref 95–110)
CHLORIDE SERPL-SCNC: 103 MMOL/L (ref 95–110)
CHLORIDE SERPL-SCNC: 104 MMOL/L (ref 95–110)
CHLORIDE SERPL-SCNC: 104 MMOL/L (ref 95–110)
CK SERPL-CCNC: ABNORMAL U/L (ref 20–200)
CLARITY UR: ABNORMAL
CO2 SERPL-SCNC: 19 MMOL/L (ref 23–29)
CO2 SERPL-SCNC: 21 MMOL/L (ref 23–29)
COLOR UR: ABNORMAL
CORONAVIRUS 229E, COMMON COLD VIRUS: NOT DETECTED
CORONAVIRUS HKU1, COMMON COLD VIRUS: NOT DETECTED
CORONAVIRUS NL63, COMMON COLD VIRUS: NOT DETECTED
CORONAVIRUS OC43, COMMON COLD VIRUS: NOT DETECTED
CREAT SERPL-MCNC: 2.8 MG/DL (ref 0.5–1.4)
CREAT SERPL-MCNC: 3 MG/DL (ref 0.5–1.4)
CREAT SERPL-MCNC: 3 MG/DL (ref 0.5–1.4)
CREAT SERPL-MCNC: 3.3 MG/DL (ref 0.5–1.4)
CV ECHO LV RWT: 0.29 CM
DIFFERENTIAL METHOD BLD: ABNORMAL
DOP CALC AO PEAK VEL: 1.84 M/S
DOP CALC AO VTI: 29 CM
DOP CALC LVOT AREA: 3.1 CM2
DOP CALC LVOT DIAMETER: 1.99 CM
DOP CALC LVOT PEAK VEL: 1.28 M/S
DOP CALC LVOT STROKE VOLUME: 76.16 CM3
DOP CALCLVOT PEAK VEL VTI: 24.5 CM
E WAVE DECELERATION TIME: 248.95 MSEC
E/A RATIO: 0.92
ECHO LV POSTERIOR WALL: 0.86 CM (ref 0.6–1.1)
EOSINOPHIL # BLD AUTO: 0 K/UL (ref 0–0.5)
EOSINOPHIL NFR BLD: 0 % (ref 0–8)
ERYTHROCYTE [DISTWIDTH] IN BLOOD BY AUTOMATED COUNT: 14.1 % (ref 11.5–14.5)
EST. GFR  (NO RACE VARIABLE): 19 ML/MIN/1.73 M^2
EST. GFR  (NO RACE VARIABLE): 21 ML/MIN/1.73 M^2
EST. GFR  (NO RACE VARIABLE): 21 ML/MIN/1.73 M^2
EST. GFR  (NO RACE VARIABLE): 23 ML/MIN/1.73 M^2
FLUBV RNA NPH QL NAA+NON-PROBE: NOT DETECTED
FOLATE SERPL-MCNC: 15 NG/ML (ref 4–24)
FRACTIONAL SHORTENING: 15 % (ref 28–44)
GLUCOSE SERPL-MCNC: 135 MG/DL (ref 70–110)
GLUCOSE SERPL-MCNC: 160 MG/DL (ref 70–110)
GLUCOSE SERPL-MCNC: 189 MG/DL (ref 70–110)
GLUCOSE SERPL-MCNC: 190 MG/DL (ref 70–110)
GLUCOSE UR QL STRIP: ABNORMAL
HAV IGM SERPL QL IA: NORMAL
HBV CORE IGM SERPL QL IA: NORMAL
HBV SURFACE AG SERPL QL IA: NORMAL
HCT VFR BLD AUTO: 32 % (ref 40–54)
HCV AB SERPL QL IA: NORMAL
HGB BLD-MCNC: 10.5 G/DL (ref 14–18)
HGB UR QL STRIP: ABNORMAL
HPIV1 RNA NPH QL NAA+NON-PROBE: NOT DETECTED
HPIV2 RNA NPH QL NAA+NON-PROBE: NOT DETECTED
HPIV3 RNA NPH QL NAA+NON-PROBE: NOT DETECTED
HPIV4 RNA NPH QL NAA+NON-PROBE: NOT DETECTED
HUMAN METAPNEUMOVIRUS: NOT DETECTED
HYALINE CASTS #/AREA URNS LPF: 0 /LPF
IMM GRANULOCYTES # BLD AUTO: 0.16 K/UL (ref 0–0.04)
IMM GRANULOCYTES NFR BLD AUTO: 1 % (ref 0–0.5)
INFLUENZA A (SUBTYPES H1,H1-2009,H3): NOT DETECTED
INTERVENTRICULAR SEPTUM: 0.77 CM (ref 0.6–1.1)
IRON SERPL-MCNC: 12 UG/DL (ref 45–160)
IVRT: 88.49 MSEC
KETONES UR QL STRIP: NEGATIVE
LA MAJOR: 5.83 CM
LA MINOR: 6.59 CM
LA WIDTH: 4.5 CM
LACTATE SERPL-SCNC: 1.3 MMOL/L (ref 0.5–2.2)
LEFT ATRIUM AREA SYSTOLIC (APICAL 2 CHAMBER): 22.35 CM2
LEFT ATRIUM AREA SYSTOLIC (APICAL 4 CHAMBER): 25.36 CM2
LEFT ATRIUM SIZE: 4.24 CM
LEFT ATRIUM VOLUME INDEX MOD: 36.5 ML/M2
LEFT ATRIUM VOLUME INDEX: 44.8 ML/M2
LEFT ATRIUM VOLUME MOD: 81.71 CM3
LEFT ATRIUM VOLUME: 100.34 CM3
LEFT INTERNAL DIMENSION IN SYSTOLE: 5.08 CM (ref 2.1–4)
LEFT VENTRICLE DIASTOLIC VOLUME INDEX: 80.38 ML/M2
LEFT VENTRICLE DIASTOLIC VOLUME: 180.05 ML
LEFT VENTRICLE END DIASTOLIC VOLUME APICAL 2 CHAMBER: 97.43 ML
LEFT VENTRICLE END DIASTOLIC VOLUME APICAL 4 CHAMBER: 120.34 ML
LEFT VENTRICLE END SYSTOLIC VOLUME APICAL 2 CHAMBER: 71.39 ML
LEFT VENTRICLE END SYSTOLIC VOLUME APICAL 4 CHAMBER: 90.73 ML
LEFT VENTRICLE MASS INDEX: 85 G/M2
LEFT VENTRICLE SYSTOLIC VOLUME INDEX: 54.7 ML/M2
LEFT VENTRICLE SYSTOLIC VOLUME: 122.51 ML
LEFT VENTRICULAR INTERNAL DIMENSION IN DIASTOLE: 6 CM (ref 3.5–6)
LEFT VENTRICULAR MASS: 190.46 G
LEUKOCYTE ESTERASE UR QL STRIP: NEGATIVE
LV LATERAL E/E' RATIO: 13.57 M/S
LVED V (TEICH): 180.05 ML
LVES V (TEICH): 122.51 ML
LVOT MG: 4.45 MMHG
LVOT MV: 1 CM/S
LYMPHOCYTES # BLD AUTO: 0.4 K/UL (ref 1–4.8)
LYMPHOCYTES NFR BLD: 2.3 % (ref 18–48)
MAGNESIUM SERPL-MCNC: 1.6 MG/DL (ref 1.6–2.6)
MCH RBC QN AUTO: 27.3 PG (ref 27–31)
MCHC RBC AUTO-ENTMCNC: 32.8 G/DL (ref 32–36)
MCV RBC AUTO: 83 FL (ref 82–98)
MICROSCOPIC COMMENT: ABNORMAL
MONOCYTES # BLD AUTO: 0.7 K/UL (ref 0.3–1)
MONOCYTES NFR BLD: 4.5 % (ref 4–15)
MV PEAK A VEL: 1.03 M/S
MV PEAK E VEL: 0.95 M/S
MV STENOSIS PRESSURE HALF TIME: 72.2 MS
MV VALVE AREA P 1/2 METHOD: 3.05 CM2
MYCOPLASMA PNEUMONIAE: NOT DETECTED
NEUTROPHILS # BLD AUTO: 14.6 K/UL (ref 1.8–7.7)
NEUTROPHILS NFR BLD: 91.9 % (ref 38–73)
NITRITE UR QL STRIP: NEGATIVE
NRBC BLD-RTO: 0 /100 WBC
OHS LV EJECTION FRACTION SIMPSONS BIPLANE MOD: 34 %
OHS QRS DURATION: 108 MS
OHS QTC CALCULATION: 482 MS
PH UR STRIP: 6 [PH] (ref 5–8)
PHOSPHATE SERPL-MCNC: 2.5 MG/DL (ref 2.7–4.5)
PISA TR MAX VEL: 2.75 M/S
PLATELET # BLD AUTO: 163 K/UL (ref 150–450)
PMV BLD AUTO: 11.9 FL (ref 9.2–12.9)
POTASSIUM SERPL-SCNC: 3.6 MMOL/L (ref 3.5–5.1)
POTASSIUM SERPL-SCNC: 3.6 MMOL/L (ref 3.5–5.1)
POTASSIUM SERPL-SCNC: 4 MMOL/L (ref 3.5–5.1)
POTASSIUM SERPL-SCNC: 4 MMOL/L (ref 3.5–5.1)
PROCALCITONIN SERPL IA-MCNC: 39.45 NG/ML
PROT SERPL-MCNC: 6.8 G/DL (ref 6–8.4)
PROT UR QL STRIP: ABNORMAL
PULM VEIN S/D RATIO: 0.72
PV MV: 1.19 M/S
PV PEAK D VEL: 0.54 M/S
PV PEAK GRADIENT: 11 MMHG
PV PEAK S VEL: 0.39 M/S
PV PEAK VELOCITY: 1.63 M/S
RA MAJOR: 5.56 CM
RA PRESSURE ESTIMATED: 3 MMHG
RA WIDTH: 4.19 CM
RBC # BLD AUTO: 3.85 M/UL (ref 4.6–6.2)
RBC #/AREA URNS HPF: 2 /HPF (ref 0–4)
RESPIRATORY INFECTION PANEL SOURCE: NORMAL
RSV RNA NPH QL NAA+NON-PROBE: NOT DETECTED
RV TB RVSP: 6 MMHG
RV TISSUE DOPPLER FREE WALL SYSTOLIC VELOCITY 1 (APICAL 4 CHAMBER VIEW): 12.51 CM/S
RV+EV RNA NPH QL NAA+NON-PROBE: NOT DETECTED
SARS-COV-2 RNA RESP QL NAA+PROBE: NOT DETECTED
SATURATED IRON: 6 % (ref 20–50)
SINUS: 3.36 CM
SODIUM SERPL-SCNC: 134 MMOL/L (ref 136–145)
SODIUM SERPL-SCNC: 134 MMOL/L (ref 136–145)
SODIUM SERPL-SCNC: 136 MMOL/L (ref 136–145)
SODIUM SERPL-SCNC: 137 MMOL/L (ref 136–145)
SP GR UR STRIP: 1.01 (ref 1–1.03)
SQUAMOUS #/AREA URNS HPF: 0 /HPF
STJ: 3.28 CM
TDI LATERAL: 0.07 M/S
TOTAL IRON BINDING CAPACITY: 195 UG/DL (ref 250–450)
TR MAX PG: 30 MMHG
TRANSFERRIN SERPL-MCNC: 132 MG/DL (ref 200–375)
TRICUSPID ANNULAR PLANE SYSTOLIC EXCURSION: 2.51 CM
TV REST PULMONARY ARTERY PRESSURE: 33 MMHG
URN SPEC COLLECT METH UR: ABNORMAL
UROBILINOGEN UR STRIP-ACNC: NEGATIVE EU/DL
VIT B12 SERPL-MCNC: 405 PG/ML (ref 210–950)
WBC # BLD AUTO: 15.89 K/UL (ref 3.9–12.7)
WBC #/AREA URNS HPF: 7 /HPF (ref 0–5)
Z-SCORE OF LEFT VENTRICULAR DIMENSION IN END DIASTOLE: -2.89
Z-SCORE OF LEFT VENTRICULAR DIMENSION IN END SYSTOLE: 0.36

## 2024-06-21 PROCEDURE — 87040 BLOOD CULTURE FOR BACTERIA: CPT | Mod: 59 | Performed by: STUDENT IN AN ORGANIZED HEALTH CARE EDUCATION/TRAINING PROGRAM

## 2024-06-21 PROCEDURE — 85025 COMPLETE CBC W/AUTO DIFF WBC: CPT | Performed by: STUDENT IN AN ORGANIZED HEALTH CARE EDUCATION/TRAINING PROGRAM

## 2024-06-21 PROCEDURE — 97161 PT EVAL LOW COMPLEX 20 MIN: CPT

## 2024-06-21 PROCEDURE — 97530 THERAPEUTIC ACTIVITIES: CPT

## 2024-06-21 PROCEDURE — 80074 ACUTE HEPATITIS PANEL: CPT

## 2024-06-21 PROCEDURE — 63600175 PHARM REV CODE 636 W HCPCS: Performed by: INTERNAL MEDICINE

## 2024-06-21 PROCEDURE — 80053 COMPREHEN METABOLIC PANEL: CPT | Performed by: STUDENT IN AN ORGANIZED HEALTH CARE EDUCATION/TRAINING PROGRAM

## 2024-06-21 PROCEDURE — 83605 ASSAY OF LACTIC ACID: CPT

## 2024-06-21 PROCEDURE — 25000003 PHARM REV CODE 250: Performed by: STUDENT IN AN ORGANIZED HEALTH CARE EDUCATION/TRAINING PROGRAM

## 2024-06-21 PROCEDURE — 87581 M.PNEUMON DNA AMP PROBE: CPT

## 2024-06-21 PROCEDURE — 82550 ASSAY OF CK (CPK): CPT | Performed by: STUDENT IN AN ORGANIZED HEALTH CARE EDUCATION/TRAINING PROGRAM

## 2024-06-21 PROCEDURE — 82550 ASSAY OF CK (CPK): CPT | Mod: 91 | Performed by: STUDENT IN AN ORGANIZED HEALTH CARE EDUCATION/TRAINING PROGRAM

## 2024-06-21 PROCEDURE — 36415 COLL VENOUS BLD VENIPUNCTURE: CPT

## 2024-06-21 PROCEDURE — 83735 ASSAY OF MAGNESIUM: CPT | Performed by: STUDENT IN AN ORGANIZED HEALTH CARE EDUCATION/TRAINING PROGRAM

## 2024-06-21 PROCEDURE — 11000001 HC ACUTE MED/SURG PRIVATE ROOM

## 2024-06-21 PROCEDURE — 36415 COLL VENOUS BLD VENIPUNCTURE: CPT | Performed by: STUDENT IN AN ORGANIZED HEALTH CARE EDUCATION/TRAINING PROGRAM

## 2024-06-21 PROCEDURE — 63600175 PHARM REV CODE 636 W HCPCS

## 2024-06-21 PROCEDURE — 87798 DETECT AGENT NOS DNA AMP: CPT

## 2024-06-21 PROCEDURE — 80048 BASIC METABOLIC PNL TOTAL CA: CPT | Mod: 91,XB | Performed by: STUDENT IN AN ORGANIZED HEALTH CARE EDUCATION/TRAINING PROGRAM

## 2024-06-21 PROCEDURE — 87040 BLOOD CULTURE FOR BACTERIA: CPT

## 2024-06-21 PROCEDURE — 97535 SELF CARE MNGMENT TRAINING: CPT

## 2024-06-21 PROCEDURE — 84145 PROCALCITONIN (PCT): CPT

## 2024-06-21 PROCEDURE — 63600175 PHARM REV CODE 636 W HCPCS: Performed by: STUDENT IN AN ORGANIZED HEALTH CARE EDUCATION/TRAINING PROGRAM

## 2024-06-21 PROCEDURE — 63700000 PHARM REV CODE 250 ALT 637 W/O HCPCS: Performed by: STUDENT IN AN ORGANIZED HEALTH CARE EDUCATION/TRAINING PROGRAM

## 2024-06-21 PROCEDURE — 97116 GAIT TRAINING THERAPY: CPT

## 2024-06-21 PROCEDURE — 80048 BASIC METABOLIC PNL TOTAL CA: CPT | Mod: XB | Performed by: STUDENT IN AN ORGANIZED HEALTH CARE EDUCATION/TRAINING PROGRAM

## 2024-06-21 PROCEDURE — 97165 OT EVAL LOW COMPLEX 30 MIN: CPT

## 2024-06-21 PROCEDURE — 25000003 PHARM REV CODE 250: Performed by: INTERNAL MEDICINE

## 2024-06-21 PROCEDURE — 81000 URINALYSIS NONAUTO W/SCOPE: CPT

## 2024-06-21 PROCEDURE — 84100 ASSAY OF PHOSPHORUS: CPT | Performed by: STUDENT IN AN ORGANIZED HEALTH CARE EDUCATION/TRAINING PROGRAM

## 2024-06-21 PROCEDURE — 25000003 PHARM REV CODE 250

## 2024-06-21 RX ORDER — METRONIDAZOLE 500 MG/100ML
500 INJECTION, SOLUTION INTRAVENOUS
Status: DISCONTINUED | OUTPATIENT
Start: 2024-06-21 | End: 2024-06-21

## 2024-06-21 RX ORDER — ACETAMINOPHEN 325 MG/1
650 TABLET ORAL EVERY 6 HOURS PRN
Status: DISCONTINUED | OUTPATIENT
Start: 2024-06-21 | End: 2024-06-21

## 2024-06-21 RX ORDER — ACETAMINOPHEN 325 MG/1
650 TABLET ORAL EVERY 6 HOURS PRN
Status: DISCONTINUED | OUTPATIENT
Start: 2024-06-21 | End: 2024-06-25 | Stop reason: HOSPADM

## 2024-06-21 RX ORDER — AZITHROMYCIN 250 MG/1
500 TABLET, FILM COATED ORAL DAILY
Status: COMPLETED | OUTPATIENT
Start: 2024-06-21 | End: 2024-06-23

## 2024-06-21 RX ADMIN — APIXABAN 5 MG: 5 TABLET, FILM COATED ORAL at 09:06

## 2024-06-21 RX ADMIN — CEFEPIME 1 G: 1 INJECTION, POWDER, FOR SOLUTION INTRAMUSCULAR; INTRAVENOUS at 01:06

## 2024-06-21 RX ADMIN — APIXABAN 5 MG: 5 TABLET, FILM COATED ORAL at 10:06

## 2024-06-21 RX ADMIN — METRONIDAZOLE 500 MG: 5 INJECTION, SOLUTION INTRAVENOUS at 01:06

## 2024-06-21 RX ADMIN — TAMSULOSIN HYDROCHLORIDE 0.4 MG: 0.4 CAPSULE ORAL at 09:06

## 2024-06-21 RX ADMIN — PAROXETINE 10 MG: 10 TABLET, FILM COATED ORAL at 06:06

## 2024-06-21 RX ADMIN — AZITHROMYCIN DIHYDRATE 500 MG: 250 TABLET ORAL at 10:06

## 2024-06-21 RX ADMIN — CARVEDILOL 6.25 MG: 6.25 TABLET, FILM COATED ORAL at 09:06

## 2024-06-21 RX ADMIN — CEFTRIAXONE SODIUM 2 G: 2 INJECTION, POWDER, FOR SOLUTION INTRAMUSCULAR; INTRAVENOUS at 10:06

## 2024-06-21 RX ADMIN — VANCOMYCIN HYDROCHLORIDE 2000 MG: 500 INJECTION, POWDER, LYOPHILIZED, FOR SOLUTION INTRAVENOUS at 02:06

## 2024-06-21 RX ADMIN — CARVEDILOL 6.25 MG: 6.25 TABLET, FILM COATED ORAL at 10:06

## 2024-06-21 RX ADMIN — NIFEDIPINE 30 MG: 30 TABLET, FILM COATED, EXTENDED RELEASE ORAL at 10:06

## 2024-06-21 RX ADMIN — LEVOTHYROXINE SODIUM 150 MCG: 75 TABLET ORAL at 05:06

## 2024-06-21 RX ADMIN — ACETAMINOPHEN 650 MG: 325 TABLET ORAL at 12:06

## 2024-06-21 NOTE — SIGNIFICANT EVENT
Called by nursing at 0000 that pt febrile to 101.4 with vitals. Pt noting that he felt warm at that time. Tylenol ordered. Infectious workup initiated as follows: RVP, CXR, procal, UA w/ reflex to cx, blood cultures, hepatitis panel. Due to concern for elevated bilirubin, transaminitis,and +Cunha's sign at pt bedside, will cover for intraabdominal source vs bacteremia vs CAP. Vancomycin, cefepime, and metronidazole initiated given penicillin allergy and dosed appropriately for CrCl. NPO for RUQ US.     Theron Abbott MD, MPH  [facility], PGY-2  06/21/2024

## 2024-06-21 NOTE — NURSING
Dr Crump notified of temp 101.4 , /77. No orders given. Stated he will review pt's chart to see if repeat BC or needed. Prn tylenol given. Pt denies any complaints.

## 2024-06-21 NOTE — PT/OT/SLP EVAL
Physical Therapy Evaluation and Treatment    Patient Name:  Deep Farah   MRN:  [id number]    Recommendations:     Discharge Recommendations:   low intensity therapy (HH PT/OT)  Discharge Equipment Recommendations: none   Barriers to discharge: None    Assessment:     Deep Farah is a 73 y.o. male admitted with a medical diagnosis of Non-traumatic rhabdomyolysis.  He presents with the following impairments/functional limitations: weakness, impaired functional mobility, gait instability, impaired endurance, impaired balance, decreased lower extremity function, impaired skin, impaired cardiopulmonary response to activity .Pt reports feeling near his baseline level of function. Pt ambulated ~60 ft x 2 with QC then RW. Pt requires increased assist (CGA-Anastasia) and decreased balance with use of cane.  Recommending use of RW to decrease fall risk due to balance deficits. Recommending low intensity therapy (HH PT/OT).     Rehab Prognosis: Good; patient would benefit from acute skilled PT services to address these deficits and reach maximum level of function.    Recent Surgery: * No surgery found *      Plan:     During this hospitalization, patient to be seen 3 x/week to address the identified rehab impairments via gait training, therapeutic activities, therapeutic exercises, neuromuscular re-education and progress toward the following goals:    Plan of Care Expires:  07/21/24    Subjective     Chief Complaint: not eating enough  Patient/Family Comments/goals: to be able to perform ADL's and hobbies  Pain/Comfort:  Pain Rating 1: 0/10  Pain Rating Post-Intervention 1: 0/10    Patients cultural, spiritual, Yarsanism conflicts given the current situation:      Living Environment:  Pt lives with brother in law and spouse in a [facility], 0 IZABEL, and WIS with shower chair and T/S  Prior to admission, patients level of function was Mod I with use of QC, hx of 1 recent fall in yard, drives, and is retired; Pt's spouse is bed bound and  receives home care, ANTONIA is in good physical condition.  Equipment used at home: cane, quad, shower chair, walker, rolling.  DME owned (not currently used): none.  Upon discharge, patient will have assistance from son, ANTONIA, and daughter who check on pt daily.    Objective:     Communicated with nsg prior to session.  Patient found HOB elevated with telemetry, PureWick  upon PT entry to room.    General Precautions: Standard, fall  Orthopedic Precautions:N/A   Braces: N/A  Respiratory Status: Room air    Exams:  Cognitive Exam:  Patient is oriented to Person, Place, Time, and Situation  Postural Exam:  Patient presented with the following abnormalities:    -       Rounded shoulders  -       Forward head  -       Kyphosis  -       B knee varus (R>L)  Sensation:    -       Intact  light/touch BLE  Skin Integrity/Edema:      -       Skin integrity: small dime sized abrasion, opened blister medial R foot  RLE ROM: WFL  RLE Strength: WFL upon functional assessment  LLE ROM: WFL  LLE Strength: WFL upon functional assessment    Functional Mobility:  Bed Mobility:     Scooting: stand by assistance  Supine to Sit: stand by assistance and contact guard assistance  Sit to Supine: stand by assistance  Transfers:     Sit to Stand:  contact guard assistance with rolling walker  Toilet Transfer: contact guard assistance with  rolling walker  using  Step Transfer  Gait: Pt ambulated ~60 ft x 2 with QC then RW. Pt requires increased assist (CGA-Anastasia) and decreased balance with use of cane. Pt was able to ambulate with CGA-SBA with use of RW. Pt demonstrates a bilateral trunk sway and decreased knee flexion in swing and fluidity of movement during gait; B knee varus R>L      AM-PAC 6 CLICK MOBILITY  Total Score:17       Treatment & Education:  Educated pt on role of PT/POC, use of RW at this time for increased stability, benefits of HH therapy, and overall home safety  Assessed bed mobility, transfers, and gait as reported  above  Trialed RW vs QC and RW deemed most appropriate, pt  verbalized understanding  Answered all questions within PT scope of practice  Educated pt on OOB mobility with staff assist to restroom and to sit up in chair for meals    Patient left HOB elevated with all lines intact, call button in reach, bed alarm on and son present.    GOALS:   Multidisciplinary Problems       Physical Therapy Goals          Problem: Physical Therapy    Goal Priority Disciplines Outcome Goal Variances Interventions   Physical Therapy Goal     PT, PT/OT Progressing     Description: Goals to be met by: 24     Patient will increase functional independence with mobility by performin. Sit to stand transfer with Modified [place]  2. Bed to chair transfer with Modified [place] using Rolling Walker  3. Gait  x 200 feet with Modified [place] using Rolling Walker.                          History:     Past Medical History:   Diagnosis Date    Bell's palsy     Diverticulitis     Hypertension        Past Surgical History:   Procedure Laterality Date    CARDIAC DEFIBRILLATOR PLACEMENT      PROSTATE SURGERY         Time Tracking:     PT Received On: 24  PT Start Time: 1342     PT Stop Time: 1409  PT Total Time (min): 27 min     Billable Minutes: Evaluation 8, Gait Training 10, and Therapeutic Activity 9      2024

## 2024-06-21 NOTE — PLAN OF CARE
Problem: Occupational Therapy  Goal: Occupational Therapy Goal  Description: Goals to be met by: 7/21/24     Patient will increase functional independence with ADLs by performing:    LE Dressing with Modified [place].  Grooming while standing with Modified [place].  Toileting from toilet with Modified [place] for hygiene and clothing management.   Supine to sit with Modified [place].  Step transfer with Modified [place]  Toilet transfer to toilet with Modified [place].  Increased functional strength to WFL for self care skills and functional mobility .  Upper extremity exercise program x10 reps per handout, with independence.    Outcome: Progressing       Pt would benefit from cont OT services in order to maximize functional independence. Recommending low intensity therapy at d/c with RW.

## 2024-06-21 NOTE — NURSING
"Entire copy of tonight's CXR results sent to Dr Crump. Results noted as read by MD in secure chat. No orders given. Replied "ok." Pt denies any complaints. Will CTCM.  "

## 2024-06-21 NOTE — NURSING
Spoke to Riya for U/S abdomen. Stated the U/S will be done tomorrow morning and to make the pt NPO from this point. Dr Crump notified.

## 2024-06-21 NOTE — NURSING
Confirmed with ER nurse, Tylia, meds Apixaban, coreg, and tamsulosin were not given. Tele monitor in place ST 1 teens noted. Pt denies any complaints. Son at BS.

## 2024-06-21 NOTE — AI DETERIORATION ALERT
Artificial Intelligence Notification  Ram[place]      Admit Date: 2024  LOS: 0  Code Status: Full Code   Date of Consult: 2024  : 1950  Age: 73 y.o.  Weight:   Wt Readings from Last 1 Encounters:   24 99.8 kg (220 lb)     Sex: male  Bed: K4/K4 A:   MRN: [id number]  Attending Physician: Kirsten Escalante MD  Primary Service: [facility] Team A-Internal Medicine  Time AI Alert Received: ***  Time at Bedside: ***           ***      Vital Signs (Most Recent):  Temp: 98.2 °F (36.8 °C) (24)  Pulse: 109 (24)  Resp: 20 (24)  BP: (!) 160/75 (24)  SpO2: 95 % (24) Vital Signs (24h Range):  Temp:  [98.2 °F (36.8 °C)-99.9 °F (37.7 °C)] 98.2 °F (36.8 °C)  Pulse:  [107-128] 109  Resp:  [20-46] 20  SpO2:  [92 %-100 %] 95 %  BP: (160-184)/(75-93) 160/75         This encounter was triggered by an Artificial Intelligence Notification.     Artificial Intelligence alert discussed with Primary team:  Name ***      Evaluation: ***    Disposition: ***

## 2024-06-21 NOTE — NURSING
Pt notified of NPO status, AAOX4, voice complete understanding, stated he will comply. Assigned PCT, Berta, informed.

## 2024-06-21 NOTE — PLAN OF CARE
Problem: Physical Therapy  Goal: Physical Therapy Goal  Description: Goals to be met by: 24     Patient will increase functional independence with mobility by performin. Sit to stand transfer with Modified [place]  2. Bed to chair transfer with Modified [place] using Rolling Walker  3. Gait  x 200 feet with Modified [place] using Rolling Walker.     Outcome: Progressing     Pt reports feeling near his baseline level of function. Pt ambulated ~60 ft x 2 with QC then RW. Pt requires increased assist (CGA-Anastasia) and decreased balance with use of cane.  Recommending use of RW to decrease fall risk due to balance deficits. Recommending low intensity therapy ( PT/OT).

## 2024-06-21 NOTE — H&P
[facility] Medicine H&P Note     Admitting Team: [facility] Hospitalist Team A  Attending Physician: Kirsten Escalante MD  Resident: Raheem  Intern: Cruz     Date of Admit: 6/20/2024    Chief Complaint       Generalized weakness, loss of consciousness x 1 day    Subjective:      History of Present Illness:  Deep Farah is a 73 y.o.  male who has PMH of pAfib s/p radiofrequency ablation, HFrEF s/p AICD (EF 25-30% with G2DD 5/2023), CAD, HTN, HLD, BPH, Gout. The patient presented to [facility] on 6/20/2024 with a primary complaint of altered level of consciousness, fall that occurred today.     The patient was in their usual state of health until today when he went outside per family at around noon and then a neighbor discovered him lying on the ground in the yard and activated 911. Patient unclear regarding events of the day but recalls feeling very well this morning when he awoke. He went outside to tend to his garden and believes he may have fallen as he has had some balance issues recently (fell yesterday while attempting to  his cane after dropping it). He does not recall any events that occurred while in the yard but family believes he may have tried to crawl to the shade as he was not strong enough to get up off of the ground. Total time of environmental exposure is unknown but possibly up to 3-4 hours. With EMS the patient reportedly was tachycardic to the 160s and was hypertensive on arrival to the ED. He has complaint currently of right knee pain as well as hunger and thirst. He denies any palpitations, shortness of breath, DAN, fever, chills, cough, nausea, vomiting, significant focal extremity pain, vision change.     Past Medical History:  Past Medical History:   Diagnosis Date    Bell's palsy     Diverticulitis     Hypertension        Past Surgical History:  Past Surgical History:   Procedure Laterality Date    CARDIAC DEFIBRILLATOR PLACEMENT      PROSTATE SURGERY    "      Allergies:  Review of patient's allergies indicates:   Allergen Reactions    Pcn [penicillins] Swelling       Home Medications:  Allopurinol 100   Eliquis 5 mg BID  Coreg 6.25 BID  Levothyroxine 150 mcg QD  Valsartan 320 mg QD  Paroxetine 10 mg QD  Crestor 5 mg QD  Flomax 0.4 mg QD  Spironolactone 25 mg QD    Family History:  Family History   Problem Relation Name Age of Onset    Stroke Father      Hypertension Father      Hypertension Brother      Prostate cancer Brother      Prostate cancer Cousin         Social History:  Social History     Tobacco Use    Smoking status: Former    Smokeless tobacco: Never   Substance Use Topics    Alcohol use: Yes     Comment: 2 beers in a week    Drug use: No       Review of Systems:  Pertinent items are noted in HPI. All other systems are reviewed and are negative.    Health Maintaince :   Primary Care Physician: Justice    Immunizations:   TDap utd    Flu utd   Pna utd    Cancer Screening:    Colonoscopy: utd; age out     Objective:   Last 24 Hour Vital Signs:  BP  Min: 168/86  Max: 184/85  Temp  Av.9 °F (37.7 °C)  Min: 99.9 °F (37.7 °C)  Max: 99.9 °F (37.7 °C)  Pulse  Av  Min: 107  Max: 128  Resp  Av.3  Min: 20  Max: 46  SpO2  Av %  Min: 92 %  Max: 100 %  Height  Av' 11" (180.3 cm)  Min: 5' 11" (180.3 cm)  Max: 5' 11" (180.3 cm)  Weight  Av.8 kg (220 lb)  Min: 99.8 kg (220 lb)  Max: 99.8 kg (220 lb)  Body mass index is 30.68 kg/m².  No intake/output data recorded.    Physical Examination:  Gen: WDWN male in NAD  HENT: left facial droop noted as chronic. Tongue with dry appearance, dry mm. No goiter. Neck veins flat  CV: regular tachycardia, S1/S2 present, no m/r/g.     Pulm: CTAB, no respiratory distress  Abd: soft, non-tender, nondistended  Extremities: no edema or deformity. No tenderness to palpation to the BLE. Compartments soft throughout the BLE.   Neuro: A&O x3, able to lift self in bed. Stands without difficulty. Facial asymmetry " "with L facial droop noted as chronic.     Laboratory:  Most Recent Data:  CBC:   Lab Results   Component Value Date    WBC 19.32 (H) 06/20/2024    HGB 11.9 (L) 06/20/2024    HCT 36.7 (L) 06/20/2024     06/20/2024    MCV 84 06/20/2024    RDW 13.8 06/20/2024     WBC Differential: 91.3 % N, 0 % Bands, 2.3 % L, 4.9 % M, 0.5 % Eo, 0.2 % Baso, 0 additional cells seen  BMP:   Lab Results   Component Value Date     06/20/2024    K 3.8 06/20/2024     06/20/2024    CO2 21 (L) 06/20/2024    BUN 36 (H) 06/20/2024    CREATININE 2.8 (H) 06/20/2024     (H) 06/20/2024    CALCIUM 9.9 06/20/2024     LFTs:   Lab Results   Component Value Date    PROT 8.8 (H) 06/20/2024    ALBUMIN 3.2 (L) 06/20/2024    BILITOT 1.6 (H) 06/20/2024     (H) 06/20/2024    ALKPHOS 87 06/20/2024     (H) 06/20/2024     Coags:   Lab Results   Component Value Date    INR 1.3 (H) 03/24/2023     FLP: No results found for: "CHOL", "HDL", "LDLCALC", "TRIG", "CHOLHDL"  DM:   Lab Results   Component Value Date    HGBA1C 5.7 06/28/2022    CREATININE 2.8 (H) 06/20/2024     Thyroid:   Lab Results   Component Value Date    TSH 8.39 (H) 06/29/2022     Anemia: No results found for: "IRON", "TIBC", "FERRITIN", "DYCVPNLU36", "FOLATE"  Cardiac: No results found for: "TROPONINI", "CKTOTAL", "CKMB", "BNP"  Urinalysis:   Lab Results   Component Value Date    COLORU yellow 03/05/2018    SPECGRAV 1.015 03/05/2018    NITRITE neg 03/05/2018    KETONESU neg 03/05/2018    UROBILINOGEN neg 03/05/2018       Trended Lab Data:  Recent Labs   Lab 06/20/24  1817   WBC 19.32*   HGB 11.9*   HCT 36.7*      MCV 84   RDW 13.8      K 3.8      CO2 21*   BUN 36*   CREATININE 2.8*   *   PROT 8.8*   ALBUMIN 3.2*   BILITOT 1.6*   *   ALKPHOS 87   *       Trended Cardiac Data:  No results for input(s): "TROPONINI", "CKTOTAL", "CKMB", "BNP" in the last 168 hours.    Microbiology Data:  N/a    Other Results:  EKG (my " interpretation): pending.    Radiology:  Imaging Results              CT Head Without Contrast (Final result)  Result time 06/20/24 18:59:32      Final result by Rhett Newell MD (06/20/24 18:59:32)                   Impression:      Negative CT of the brain for acute hemorrhage, mass or infarction.    Involutional and chronic small vessel changes.    Multifocal cervical spondylosis and stenosis appearing chronic.    No evidence of acute cervical fracture, subluxation or hematoma.      Electronically signed by: Rhett Newell  Date:    06/20/2024  Time:    18:59               Narrative:    EXAMINATION:  CT HEAD WITHOUT CONTRAST; CT CERVICAL SPINE WITHOUT CONTRAST    CLINICAL HISTORY:  Head trauma, minor (Age >= 65y);; Neck trauma (Age >= 65y);    TECHNIQUE:  Low dose axial images were obtained through the head and cervical spine.  Coronal and sagittal reformations were also performed. Contrast was not administered.    COMPARISON:  None.    FINDINGS:  BRAIN:    Brain parenchyma appears intact with generalized low attenuation in the periventricular and deep white matter.  There is normal gray-white matter differentiation.  No mass, mass effect or pathologic fluid collection.    Ventricles and basilar cisterns appear appropriate.    Calvarium intact.  Sinuses are clear with no significant middle ear or mastoid opacity.  Orbits and orbital contents unremarkable.    CERVICAL SPINE:    Generalized cervical spondylosis most severe at C5-6 and C6-7 with osteophytes and disc space narrowing.  Straightening of cervical lordosis.  Multifocal facet arthropathy some of which demonstrate erosive changes especially on the right at C2-3.  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  No fracture or subluxation.  No hematoma.  The visceral spaces of the neck appear unremarkable.    The cervical cranial and cervicothoracic junctions are maintained.    .                                        CT Cervical Spine Without Contrast (Final result)  Result time 06/20/24 18:59:32      Final result by Rhett Newell MD (06/20/24 18:59:32)                   Impression:      Negative CT of the brain for acute hemorrhage, mass or infarction.    Involutional and chronic small vessel changes.    Multifocal cervical spondylosis and stenosis appearing chronic.    No evidence of acute cervical fracture, subluxation or hematoma.      Electronically signed by: Rhett Newell  Date:    06/20/2024  Time:    18:59               Narrative:    EXAMINATION:  CT HEAD WITHOUT CONTRAST; CT CERVICAL SPINE WITHOUT CONTRAST    CLINICAL HISTORY:  Head trauma, minor (Age >= 65y);; Neck trauma (Age >= 65y);    TECHNIQUE:  Low dose axial images were obtained through the head and cervical spine.  Coronal and sagittal reformations were also performed. Contrast was not administered.    COMPARISON:  None.    FINDINGS:  BRAIN:    Brain parenchyma appears intact with generalized low attenuation in the periventricular and deep white matter.  There is normal gray-white matter differentiation.  No mass, mass effect or pathologic fluid collection.    Ventricles and basilar cisterns appear appropriate.    Calvarium intact.  Sinuses are clear with no significant middle ear or mastoid opacity.  Orbits and orbital contents unremarkable.    CERVICAL SPINE:    Generalized cervical spondylosis most severe at C5-6 and C6-7 with osteophytes and disc space narrowing.  Straightening of cervical lordosis.  Multifocal facet arthropathy some of which demonstrate erosive changes especially on the right at C2-3.  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  No fracture or subluxation.  No hematoma.  The visceral spaces of the neck appear unremarkable.    The cervical cranial and cervicothoracic junctions are maintained.    .                                        Assessment:     Deep Farah is a 73 y.o. male with PMH of HFrEF, pAfib, CAD, [place] palsy presents after being found lying in his yard today after syncopizing in the heat or falling from a chair in the yard and being unable to get off of the ground. History unfortunately limited as unwitnessed however patient A&O upon arrival to the ED. Tachycardic and given 1L NS. Discovered to have rhabdomyolysis with CK of >20,000 with renal injury with creatinine of 2.8 and last available 1.35 in system 1 year ago. No evidence of compartment syndrome on examination. Admitted to [facility] for treatment of rhabdomyolysis with acute kidney injury for further treatment.     Plan:     Non-Traumatic Rhabdomyolysis with Stage 2 Acute Kidney Injury  CKD3A  Fall, unwitnessed  - suspect related to prolonged immobility on ground and possible heat injury; low suspicion for compartment syndrome at present.   - initial creatinine 2.8 on prior of 1.35 in 2023; [place] [address]; got 1 L NS in ED  - trend BMP/CK q6 initially  - ensure CK downtrending  - initiate IVF at 175 cc/hr with careful monitoring given CHF status  - Given recent falls that are balance/mechanical suspect similar etiology although unclear history   - rpt TTE  - PT/OT for DME and ambulatory safety eval.     Transaminitis  - believed 2/2 CK  - monitor on rpt CMP  - if not resolving check hep panel/US    Leukemoid reaction  Normocytic Anemia  - WBC 19.3, HGB 11.9.   - appears hemoconcentrated vs baseline  - iron panel, b12/folate  - monitor for fever and repeat WBC on AM labs; no suspected source at current      Hypertension  - hypertensive to 180s; did not take meds this AM  - on Valsartan 320 mg daily as outpatient; holding given DICK   - nifedipine 30 mg XR now and daily given DICK; continue Coreg    Paroxysmal Atrial fibrillation  - continue eliquis  - coreg 6.25 for rate control/HTN     Heart Failure with Reduced Ejection Fraction, NYHA Class 1  Status post AICD  - EF  25-30% on TTE 5/2023; asymptomatic at baseline  - NYHA class 1 per report  - holding Valsartan and aldactone given DICK; continue Coreg currently    Coronary Artery Disease of native vessel of native heart  Hyperlipidemia  - holding crestor 5 mg given transaminitis    Hypothyroidism, acquired  - continue levothyroxine 150 mcg QD  - check TSH/FT4    Benign Prostatic Hypertrophy  - continue flomax 0.4 mg nightly      Mood Disorder  - continue paroxetine     Diet: cardiac  VTE Ppx: eliquis  Code: Full  Dispo: admit to med/tele for further management of rhabdo         Josep Maciel, DO  [facility] -[facility] Medicine Hospitalist Pager numbers:   [facility] Hospitalist Medicine Team A (Marli/Boris): [phone]  [facility] Hospitalist Medicine Team B (Danie/Chani):  [phone]

## 2024-06-21 NOTE — PROGRESS NOTES
"Pharmacokinetic Initial Assessment: IV Vancomycin    Assessment/Plan:    Initiate intravenous vancomycin with loading dose of 2000 mg once with subsequent doses when random concentrations are less than 20 mcg/mL  Desired empiric serum trough concentration is 15 to 20 mcg/mL  Draw vancomycin random level on 6/22 at 0100.  Pharmacy will continue to follow and monitor vancomycin.      Please contact pharmacy at extension [phone] with any questions regarding this assessment.     Thank you for the consult,   Cathryn JULITA Uvaldo       Patient brief summary:  Deep Farah is a 73 y.o. male initiated on antimicrobial therapy with IV Vancomycin for treatment of suspected bacteremia    Drug Allergies:   Review of patient's allergies indicates:   Allergen Reactions    Pcn [penicillins] Swelling       Actual Body Weight:   99kg    Renal Function:   Estimated Creatinine Clearance: 27.4 mL/min (A) (based on SCr of 2.8 mg/dL (H)).,     Dialysis Method (if applicable):  N/A    CBC (last 72 hours):  Recent Labs   Lab Result Units 06/20/24 1817 06/20/24  2046   WBC K/uL 19.32*  --    Hemoglobin g/dL 11.9*  --    Hemoglobin A1C %  --  5.0   Hematocrit % 36.7*  --    Platelets K/uL 194  --    Gran % % 91.3*  --    Lymph % % 2.3*  --    Mono % % 4.9  --    Eosinophil % % 0.5  --    Basophil % % 0.2  --    Differential Method  Automated  --        Metabolic Panel (last 72 hours):  Recent Labs   Lab Result Units 06/20/24 1817 06/21/24  0000 06/21/24  0042   Sodium mmol/L 137 136  --    Potassium mmol/L 3.8 4.0  --    Chloride mmol/L 101 103  --    CO2 mmol/L 21* 19*  --    Glucose mg/dL 174* 160*  --    Glucose, UA   --   --  Trace*   BUN mg/dL 36* 39*  --    Creatinine mg/dL 2.8* 2.8*  --    Albumin g/dL 3.2*  --   --    Total Bilirubin mg/dL 1.6*  --   --    Alkaline Phosphatase U/L 87  --   --    AST U/L 433*  --   --    ALT U/L 126*  --   --        Drug levels (last 3 results):  No results for input(s): "VANCOMYCINRA", "VANCORANDOM", " ""VANCOMYCINPE", "VANCOPEAK", "VANCOMYCINTR", "VANCOTROUGH" in the last 72 hours.    Microbiologic Results:  Microbiology Results (last 7 days)       Procedure Component Value Units Date/Time    Respiratory Infection Panel (PCR), Nasopharyngeal [[id number]] Collected: 06/21/24 0032    Order Status: Completed Specimen: Nasopharyngeal Swab Updated: 06/21/24 0046     Respiratory Infection Panel Source NP Swab    Narrative:      Assay not valid for lower respiratory specimens, alternate  testing required.    Blood culture [[id number]] Collected: 06/21/24 0021    Order Status: Sent Specimen: Blood             "

## 2024-06-21 NOTE — PT/OT/SLP EVAL
Occupational Therapy   Evaluation/Treatment     Name: Deep Farah  MRN: [id number]  Admitting Diagnosis: Non-traumatic rhabdomyolysis  Recent Surgery: * No surgery found *      Recommendations:     Discharge Recommendations: Low Intensity Therapy  Discharge Equipment Recommendations:  walker, rolling  Barriers to discharge:  Decreased caregiver support    Assessment:     Deep Farah is a 73 y.o. male with a medical diagnosis of Non-traumatic rhabdomyolysis.  He presents with The primary encounter diagnosis was DICK (acute kidney injury). Diagnoses of Hyperglycemia, Non-traumatic rhabdomyolysis, Dehydration, Acute renal failure due to rhabdomyolysis, Chest pain, Tachycardia, HFrEF (heart failure with reduced ejection fraction), CKD stage 3a, GFR 45-59 ml/min, Transaminitis, Fever, unspecified fever cause, Hyperbilirubinemia, Normocytic anemia, Paroxysmal atrial fibrillation, Chronic systolic congestive heart failure, NYHA class 2, Coronary artery disease involving native coronary artery of native heart without angina pectoris, and Hypothyroidism, unspecified type were also pertinent to this visit.  . Performance deficits affecting function: weakness, impaired self care skills, impaired functional mobility, gait instability, decreased upper extremity function, decreased lower extremity function.        Pt would benefit from cont OT services in order to maximize functional independence. Recommending low intensity therapy at d/c with RW.     Rehab Prognosis: Good; patient would benefit from acute skilled OT services to address these deficits and reach maximum level of function.       Plan:     Patient to be seen 3 x/week to address the above listed problems via self-care/home management, therapeutic activities, therapeutic exercises  Plan of Care Expires: 07/21/24  Plan of Care Reviewed with: patient, son    Subjective     Chief Complaint: no complaints  Patient/Family Comments/goals: return home     Occupational  "Profile:  Living Environment: with spouse and brother in law in H, no steps to enter, WIS and t/s combo  Previous level of function: mod I/independent; drives   Equipment Used at Home: cane, quad, shower chair, walker, rolling  Assistance upon Discharge: spouse unable to assist; brother in law able to assist; dghtr and son assist as able     Pain/Comfort:  Pain Rating 1: 0/10    Patients cultural, spiritual, Jewish conflicts given the current situation:      Objective:     Communicated with: nsg prior to session.  Patient found supine with   upon OT entry to room.    General Precautions: Standard, fall  Orthopedic Precautions: N/A  Braces: N/A  Respiratory Status: Room air    Occupational Performance:    Bed Mobility:    Patient completed Scooting/Bridging with stand by assistance  Patient completed Supine to Sit with stand by assistance  Patient completed Sit to Supine with stand by assistance    Functional Mobility/Transfers:  Patient completed Sit <> Stand Transfer with contact guard assistance and minimum assistance  with  rolling walker   Patient completed Toilet Transfer Step Transfer technique with minimum assistance with  rolling walker  Functional Mobility: CGA- SBA with RW; CGA- Min A with QC; "waddling" gait     Activities of Daily Living:  Lower Body Dressing: stand by assistance seated EOB   Toileting: minimum assistance /moderate assist clothing management     Cognitive/Visual Perceptual:  WFL     Physical Exam:  Balance:    -       WFL seated; fair to fair + standing   Postural examination/scapula alignment:    -       Rounded shoulders  Dominant hand:    -       right  Upper Extremity Range of Motion:   BUE not formally assessed; appears WFL for pt's needs based on function during session   Upper Extremity Strength:  BUE grossly WFL for pt's needs based on function; not formally assessed during session    Strength:  WFL     AMPA 6 Click ADL:  AMPAC Total Score: 20    Treatment & " Education:  Pt found supine   Performed functional mobility in room with use of RW for participation in toileting   Pt requires cues for safety awareness with use   QC utilized for mobility in hallway, however, pt with decreased balance  Educated pt and son on recs of use of RW and d/c recs of home health; discussed home safety     Patient left supine with all lines intact, call button in reach, bed alarm on, nsg notified, and son present    GOALS:   Multidisciplinary Problems       Occupational Therapy Goals          Problem: Occupational Therapy    Goal Priority Disciplines Outcome Interventions   Occupational Therapy Goal     OT, PT/OT Progressing    Description: Goals to be met by: 7/21/24     Patient will increase functional independence with ADLs by performing:    LE Dressing with Modified [place].  Grooming while standing with Modified [place].  Toileting from toilet with Modified [place] for hygiene and clothing management.   Supine to sit with Modified [place].  Step transfer with Modified [place]  Toilet transfer to toilet with Modified [place].  Increased functional strength to WFL for self care skills and functional mobility .  Upper extremity exercise program x10 reps per handout, with independence.                         History:     Past Medical History:   Diagnosis Date    Bell's palsy     Diverticulitis     Hypertension          Past Surgical History:   Procedure Laterality Date    CARDIAC DEFIBRILLATOR PLACEMENT      PROSTATE SURGERY         Time Tracking:     OT Date of Treatment: 06/21/24  OT Start Time: 1342  OT Stop Time: 1408  OT Total Time (min): 26 min    Billable Minutes:Evaluation 8  Self Care/Home Management 10  Therapeutic Activity 8    6/21/2024

## 2024-06-21 NOTE — NURSING
Dr Crump notified of CO2 resulted 19 and CPK 20575 from previous 20580. No VO. MD ordered a LA Stated ok.

## 2024-06-21 NOTE — PLAN OF CARE
SW met with pt at bedside this AM to complete DCA. Pt reported 0/10 pain and was in a pleasant mood with positive affect throughout assessment. Pt stated that he lives at home with his wife Ange [phone] but will have his son Haim help transport him home at time of d/c. Per pt he has a RW at home that he will use to complete his ADL's. Pt is not current with any [employer] company and still drives to all his appts. Pt did not have any additional questions or concerns for sw at this time. White board updated with CM name and contact information.  Discharge brochure provided.  Pt encouraged to call with any questions or concerns.  Cm will continue to follow pt through transitions of care and assist with any discharge needs.    Ramez Burleson, CIRO  [phone]     06/21/24 1413   Discharge Assessment   Assessment Type Discharge Planning Assessment   Confirmed/corrected address, phone number and insurance Yes   Confirmed Demographics Correct on Facesheet   Source of Information patient   When was your last doctors appointment?   (per pt 2 weeks)   Communicated ANAIS with patient/caregiver Yes   Reason For Admission non traumatic rhabdomyolysis   People in Home spouse   Facility Arrived From: home   Do you expect to return to your current living situation? Yes   Do you have help at home or someone to help you manage your care at home? Yes   Who are your caregiver(s) and their phone number(s)? wife Ange [phone]   Prior to hospitilization cognitive status: Alert/Oriented   Current cognitive status: Alert/Oriented   Walking or Climbing Stairs Difficulty no   Walking or Climbing Stairs ambulation difficulty, requires equipment   Dressing/Bathing Difficulty no   Do you have any problems with: Errands/Grocery   Home Accessibility wheelchair accessible   Home Layout Able to live on 1st floor   Equipment Currently Used at Home walker, rolling   Readmission within 30 days? No   Patient currently being followed by outpatient case  management? No   Do you currently have service(s) that help you manage your care at home? No   Do you take prescription medications? Yes   Do you have prescription coverage? Yes   Coverage PHN   Do you have any problems affording any of your prescribed medications? No   Is the patient taking medications as prescribed? yes   Who is going to help you get home at discharge? wife Ange [phone]/ sON Haim   How do you get to doctors appointments? car, drives self   Are you on dialysis? No   Do you take coumadin? No   Discharge Plan A Home with family   DME Needed Upon Discharge  other (see comments)  (TBD)   Discharge Plan discussed with: Patient   Transition of Care Barriers None   Physical Activity   On average, how many days per week do you engage in moderate to strenuous exercise (like a brisk walk)? 3 days   On average, how many minutes do you engage in exercise at this level? 50 min   Financial Resource Strain   How hard is it for you to pay for the very basics like food, housing, medical care, and heating? Not hard   Housing Stability   In the last 12 months, was there a time when you were not able to pay the mortgage or rent on time? N   At any time in the past 12 months, were you homeless or living in a shelter (including now)? N   Transportation Needs   Has the lack of transportation kept you from medical appointments, meetings, work or from getting things needed for daily living? No   Food Insecurity   Within the past 12 months, you worried that your food would run out before you got the money to buy more. Never true   Stress   Do you feel stress - tense, restless, nervous, or anxious, or unable to sleep at night because your mind is troubled all the time - these days? Rather much   Social Isolation   How often do you feel lonely or isolated from those around you?  Never   Alcohol Use   Q1: How often do you have a drink containing alcohol? Monthly or l   Q2: How many drinks containing alcohol do you have  on a typical day when you are drinking? 3 or 4   Q3: How often do you have six or more drinks on one occasion? Less than mo   Utilities   In the past 12 months has the electric, gas, oil, or water company threatened to shut off services in your home? No   Health Literacy   How often do you need to have someone help you when you read instructions, pamphlets, or other written material from your doctor or pharmacy? Sometimes   OTHER   Name(s) of People in Home wife Ange [phone]

## 2024-06-21 NOTE — PROGRESS NOTES
06/20/24 2154   Patient Request   Patient Requested AI alert   Nurse Notification   Charge Nurse Notified? Yes   Name of Charge Nurse DIVINE Pineda   Bedside Nurse Notified? Yes   Name of Bedside Nurse John Polanco RN   Nurse Notfication Method Secure Chat   Nurse Notified Of Other   Provider Notification   Provider Notified? Yes   Name of Provider Dr. Theron Abbott   Provider Notification Method Telephone;Secure Chat

## 2024-06-21 NOTE — PROGRESS NOTES
"[facility] Hospital Medicine Progress Note    Primary Team: [facility] Hospitalist Team A  Attending Physician: Kirsten Escalante MD  Resident: Raheem  Intern: Cruz    Subjective:      Febrile to 101.4 overnight; started on abx. Feels well this morning. No abdominal pain, nausea, vomiting, shortness of breath.      Objective:     Last 24 Hour Vital Signs:  BP  Min: 151/92  Max: 184/85  Temp  Av.2 °F (37.9 °C)  Min: 98.2 °F (36.8 °C)  Max: 101.4 °F (38.6 °C)  Pulse  Av.9  Min: 94  Max: 128  Resp  Av.9  Min: 18  Max: 46  SpO2  Av.1 %  Min: 92 %  Max: 100 %  Height  Av' 11" (180.3 cm)  Min: 5' 11" (180.3 cm)  Max: 5' 11" (180.3 cm)  Weight  Av.3 kg (225 lb 8.3 oz)  Min: 99.8 kg (220 lb)  Max: 104.8 kg (231 lb 0.7 oz)  No intake/output data recorded.    Physical Examination:  Gen: WDWN male in NAD  HENT: left facial droop noted as chronic. Tongue with dry appearance, dry mm. No goiter. Neck veins flat  CV: regular tachycardia, S1/S2 present, no m/r/g.     Pulm: LLL rales, no respiratory distress  Abd: soft, non-tender, nondistended. No silva sign  Extremities: no edema or deformity. No tenderness to palpation to the BLE. Compartments soft throughout the BLE.   Neuro: A&O x3, able to lift self in bed. Stands without difficulty. Facial asymmetry with L facial droop noted as chronic.     Laboratory:  Laboratory Data Reviewed: yes  Pertinent Findings:      Microbiology Data Reviewed: yes  Pertinent Findings:      Other Results:  EKG (my interpretation): .    Radiology Data Reviewed: yes  Pertinent Findings:      Current Medications:     Infusions:   lactated ringers   Intravenous Continuous 175 mL/hr at 24 Restarted at 24        Scheduled:   apixaban  5 mg Oral BID    carvediloL  6.25 mg Oral BID    ceFEPime IV (PEDS and ADULTS)  1 g Intravenous Q12H    levothyroxine  150 mcg Oral Before breakfast    metronidazole  500 mg Intravenous Q8H    NIFEdipine  30 mg Oral Daily    " paroxetine  10 mg Oral QAM    tamsulosin  0.4 mg Oral QHS        PRN:    Current Facility-Administered Medications:     acetaminophen, 650 mg, Oral, Q6H PRN    dextrose 10%, 12.5 g, Intravenous, PRN    dextrose 10%, 25 g, Intravenous, PRN    glucagon (human recombinant), 1 mg, Intramuscular, PRN    glucose, 16 g, Oral, PRN    glucose, 24 g, Oral, PRN    LIDOcaine, 1 patch, Transdermal, Daily PRN    naloxone, 0.02 mg, Intravenous, PRN    oxyCODONE, 5 mg, Oral, Q6H PRN    polyethylene glycol, 17 g, Oral, Daily PRN    senna-docusate 8.6-50 mg, 1 tablet, Oral, Nightly PRN    sodium chloride 0.9%, 10 mL, Intravenous, Q12H PRN    tiZANidine, 4 mg, Oral, Q8H PRN    Pharmacy to dose Vancomycin consult, , , Once **AND** vancomycin - pharmacy to dose, , Intravenous, pharmacy to manage frequency    Antibiotics and Day Number of Therapy:  Vanc 6/20-   Cefepime 6/20-  Flagyl 6/20-    Lines and Day Number of Therapy:  PIV    Assessment:   Deep Farah is a 73 y.o. male with PMH of HFrEF, pAfib, CAD, [place] palsy presents after being found lying in his yard today after syncopizing in the heat or falling from a chair in the yard and being unable to get off of the ground. History unfortunately limited as unwitnessed however patient A&O upon arrival to the ED. Tachycardic and given 1L NS. Discovered to have rhabdomyolysis with CK of >20,000 with renal injury with creatinine of 2.8 and last available 1.35 in system 1 year ago. No evidence of compartment syndrome on examination. Admitted to [facility] for treatment of rhabdomyolysis with acute kidney injury for further treatment with subsequent fever and initiation of broad spectrum antibiotics.     Plan:     Non-Traumatic Rhabdomyolysis with Stage 2 Acute Kidney Injury  CKD3A  Fall, unwitnessed  - suspect related to prolonged immobility on ground and possible heat injury; low suspicion for compartment syndrome at present.   - initial creatinine 2.8 on prior of 1.35 in 2023; [place]  [address]; got 1 L NS in ED  - trend BMP/CK q6 initially  - CK downtrending; continue BID trend  - IVF at 175 cc/hr with careful monitoring given CHF status  - Given recent falls that are balance/mechanical suspect similar etiology although unclear history   - rpt TTE  - PT/OT for DME and ambulatory safety eval.     Sepsis  RUQ Pain  Hazy lower lung opacities  - febrile overnight 6/20 resolving without intervention; still leukocytotic  - blood cx obtained and resp panel PCR  - getting IVF  - started on vanc/cefepime/flagyl overnight and RUQ us ordered  - rpt CXR with LLL opacification c/w PNA; narrowed to rocephin and azithromycin     Transaminitis  - believed 2/2 CK, checking Hep panel and RUQ US given fever  - monitor on rpt CMP  - transaminases and t bili downtrending; non-cholestatic pattern    Normocytic Anemia  - HGB 11.9.   - appears hemoconcentrated vs baseline  - iron panel, b12/folate  - monitor for fever and repeat WBC on AM labs; no suspected source at current      Hypertension  - hypertensive to 180s; did not take meds morning of admission  - on Valsartan 320 mg daily as outpatient; holding given DICK   - nifedipine 30 mg XR now and daily given DICK; continue Coreg     Paroxysmal Atrial fibrillation  - continue eliquis  - coreg 6.25 for rate control/HTN      Heart Failure with Reduced Ejection Fraction, NYHA Class 1  Status post AICD  - EF 25-30% on TTE 5/2023; asymptomatic at baseline  - NYHA class 1 per report  - holding Valsartan and aldactone given DICK; continue Coreg currently     Coronary Artery Disease of native vessel of native heart  Hyperlipidemia  - hold crestor 5 mg given transaminitis     Hypothyroidism, acquired  - continue levothyroxine 150 mcg QD  - check TSH/FT4     Benign Prostatic Hypertrophy  - continue flomax 0.4 mg nightly        Mood Disorder  - continue paroxetine      Diet: cardiac  VTE Ppx: eliquis  Code: Full  Dispo: continue med/tele care    Josep Maciel DO  [facility]  Internal Medicine/Emergency Medicine -hospitals Medicine Hospitalist Pager numbers:   [facility] Hospitalist Medicine Team A (Marli/Boris): [phone]  [facility] Hospitalist Medicine Team B (Danie/Chani):  [phone]

## 2024-06-22 PROBLEM — E87.20 METABOLIC ACIDOSIS: Status: ACTIVE | Noted: 2024-06-22

## 2024-06-22 PROBLEM — J18.9 PNEUMONIA OF LEFT LOWER LOBE DUE TO INFECTIOUS ORGANISM: Status: ACTIVE | Noted: 2024-06-22

## 2024-06-22 LAB
ALBUMIN SERPL BCP-MCNC: 2.3 G/DL (ref 3.5–5.2)
ALP SERPL-CCNC: 74 U/L (ref 55–135)
ALT SERPL W/O P-5'-P-CCNC: 127 U/L (ref 10–44)
ANION GAP SERPL CALC-SCNC: 11 MMOL/L (ref 8–16)
ANION GAP SERPL CALC-SCNC: 14 MMOL/L (ref 8–16)
ANION GAP SERPL CALC-SCNC: 14 MMOL/L (ref 8–16)
AST SERPL-CCNC: 314 U/L (ref 10–40)
BASOPHILS # BLD AUTO: 0.06 K/UL (ref 0–0.2)
BASOPHILS NFR BLD: 0.4 % (ref 0–1.9)
BILIRUB SERPL-MCNC: 1 MG/DL (ref 0.1–1)
BUN SERPL-MCNC: 54 MG/DL (ref 8–23)
BUN SERPL-MCNC: 54 MG/DL (ref 8–23)
BUN SERPL-MCNC: 66 MG/DL (ref 8–23)
CALCIUM SERPL-MCNC: 8.8 MG/DL (ref 8.7–10.5)
CALCIUM SERPL-MCNC: 9 MG/DL (ref 8.7–10.5)
CALCIUM SERPL-MCNC: 9 MG/DL (ref 8.7–10.5)
CHLORIDE SERPL-SCNC: 103 MMOL/L (ref 95–110)
CHLORIDE SERPL-SCNC: 103 MMOL/L (ref 95–110)
CHLORIDE SERPL-SCNC: 104 MMOL/L (ref 95–110)
CK SERPL-CCNC: 7560 U/L (ref 20–200)
CK SERPL-CCNC: ABNORMAL U/L (ref 20–200)
CO2 SERPL-SCNC: 19 MMOL/L (ref 23–29)
CO2 SERPL-SCNC: 19 MMOL/L (ref 23–29)
CO2 SERPL-SCNC: 20 MMOL/L (ref 23–29)
CREAT SERPL-MCNC: 3.8 MG/DL (ref 0.5–1.4)
CREAT SERPL-MCNC: 3.8 MG/DL (ref 0.5–1.4)
CREAT SERPL-MCNC: 4.7 MG/DL (ref 0.5–1.4)
DIFFERENTIAL METHOD BLD: ABNORMAL
EOSINOPHIL # BLD AUTO: 0 K/UL (ref 0–0.5)
EOSINOPHIL NFR BLD: 0.2 % (ref 0–8)
ERYTHROCYTE [DISTWIDTH] IN BLOOD BY AUTOMATED COUNT: 14.5 % (ref 11.5–14.5)
EST. GFR  (NO RACE VARIABLE): 12 ML/MIN/1.73 M^2
EST. GFR  (NO RACE VARIABLE): 16 ML/MIN/1.73 M^2
EST. GFR  (NO RACE VARIABLE): 16 ML/MIN/1.73 M^2
GLUCOSE SERPL-MCNC: 116 MG/DL (ref 70–110)
GLUCOSE SERPL-MCNC: 126 MG/DL (ref 70–110)
GLUCOSE SERPL-MCNC: 126 MG/DL (ref 70–110)
HCT VFR BLD AUTO: 31.9 % (ref 40–54)
HGB BLD-MCNC: 10.5 G/DL (ref 14–18)
IMM GRANULOCYTES # BLD AUTO: 0.13 K/UL (ref 0–0.04)
IMM GRANULOCYTES NFR BLD AUTO: 0.8 % (ref 0–0.5)
LYMPHOCYTES # BLD AUTO: 0.7 K/UL (ref 1–4.8)
LYMPHOCYTES NFR BLD: 3.9 % (ref 18–48)
MAGNESIUM SERPL-MCNC: 1.9 MG/DL (ref 1.6–2.6)
MCH RBC QN AUTO: 27.2 PG (ref 27–31)
MCHC RBC AUTO-ENTMCNC: 32.9 G/DL (ref 32–36)
MCV RBC AUTO: 83 FL (ref 82–98)
MONOCYTES # BLD AUTO: 0.9 K/UL (ref 0.3–1)
MONOCYTES NFR BLD: 5.2 % (ref 4–15)
NEUTROPHILS # BLD AUTO: 15.2 K/UL (ref 1.8–7.7)
NEUTROPHILS NFR BLD: 89.5 % (ref 38–73)
NRBC BLD-RTO: 0 /100 WBC
PHOSPHATE SERPL-MCNC: 3.4 MG/DL (ref 2.7–4.5)
PLATELET # BLD AUTO: 168 K/UL (ref 150–450)
PMV BLD AUTO: 12.8 FL (ref 9.2–12.9)
POTASSIUM SERPL-SCNC: 3.7 MMOL/L (ref 3.5–5.1)
POTASSIUM SERPL-SCNC: 3.9 MMOL/L (ref 3.5–5.1)
POTASSIUM SERPL-SCNC: 3.9 MMOL/L (ref 3.5–5.1)
PROT SERPL-MCNC: 6.8 G/DL (ref 6–8.4)
RBC # BLD AUTO: 3.86 M/UL (ref 4.6–6.2)
SODIUM SERPL-SCNC: 135 MMOL/L (ref 136–145)
SODIUM SERPL-SCNC: 136 MMOL/L (ref 136–145)
SODIUM SERPL-SCNC: 136 MMOL/L (ref 136–145)
WBC # BLD AUTO: 16.99 K/UL (ref 3.9–12.7)

## 2024-06-22 PROCEDURE — 85025 COMPLETE CBC W/AUTO DIFF WBC: CPT | Performed by: STUDENT IN AN ORGANIZED HEALTH CARE EDUCATION/TRAINING PROGRAM

## 2024-06-22 PROCEDURE — 83735 ASSAY OF MAGNESIUM: CPT | Performed by: STUDENT IN AN ORGANIZED HEALTH CARE EDUCATION/TRAINING PROGRAM

## 2024-06-22 PROCEDURE — 63600175 PHARM REV CODE 636 W HCPCS: Performed by: STUDENT IN AN ORGANIZED HEALTH CARE EDUCATION/TRAINING PROGRAM

## 2024-06-22 PROCEDURE — 63600175 PHARM REV CODE 636 W HCPCS

## 2024-06-22 PROCEDURE — 11000001 HC ACUTE MED/SURG PRIVATE ROOM

## 2024-06-22 PROCEDURE — 84100 ASSAY OF PHOSPHORUS: CPT | Performed by: STUDENT IN AN ORGANIZED HEALTH CARE EDUCATION/TRAINING PROGRAM

## 2024-06-22 PROCEDURE — 80053 COMPREHEN METABOLIC PANEL: CPT | Performed by: STUDENT IN AN ORGANIZED HEALTH CARE EDUCATION/TRAINING PROGRAM

## 2024-06-22 PROCEDURE — 63700000 PHARM REV CODE 250 ALT 637 W/O HCPCS: Performed by: STUDENT IN AN ORGANIZED HEALTH CARE EDUCATION/TRAINING PROGRAM

## 2024-06-22 PROCEDURE — 36415 COLL VENOUS BLD VENIPUNCTURE: CPT | Performed by: STUDENT IN AN ORGANIZED HEALTH CARE EDUCATION/TRAINING PROGRAM

## 2024-06-22 PROCEDURE — 82550 ASSAY OF CK (CPK): CPT | Performed by: STUDENT IN AN ORGANIZED HEALTH CARE EDUCATION/TRAINING PROGRAM

## 2024-06-22 PROCEDURE — 25000003 PHARM REV CODE 250: Performed by: STUDENT IN AN ORGANIZED HEALTH CARE EDUCATION/TRAINING PROGRAM

## 2024-06-22 PROCEDURE — 80048 BASIC METABOLIC PNL TOTAL CA: CPT | Mod: XB | Performed by: STUDENT IN AN ORGANIZED HEALTH CARE EDUCATION/TRAINING PROGRAM

## 2024-06-22 RX ORDER — LOPERAMIDE HYDROCHLORIDE 2 MG/1
2 CAPSULE ORAL 2 TIMES DAILY PRN
Status: DISCONTINUED | OUTPATIENT
Start: 2024-06-22 | End: 2024-06-23

## 2024-06-22 RX ADMIN — CEFTRIAXONE SODIUM 2 G: 2 INJECTION, POWDER, FOR SOLUTION INTRAMUSCULAR; INTRAVENOUS at 09:06

## 2024-06-22 RX ADMIN — CARVEDILOL 6.25 MG: 6.25 TABLET, FILM COATED ORAL at 09:06

## 2024-06-22 RX ADMIN — AZITHROMYCIN DIHYDRATE 500 MG: 250 TABLET ORAL at 09:06

## 2024-06-22 RX ADMIN — PAROXETINE 10 MG: 10 TABLET, FILM COATED ORAL at 06:06

## 2024-06-22 RX ADMIN — SODIUM CHLORIDE, POTASSIUM CHLORIDE, SODIUM LACTATE AND CALCIUM CHLORIDE: 600; 310; 30; 20 INJECTION, SOLUTION INTRAVENOUS at 04:06

## 2024-06-22 RX ADMIN — SODIUM CHLORIDE, POTASSIUM CHLORIDE, SODIUM LACTATE AND CALCIUM CHLORIDE: 600; 310; 30; 20 INJECTION, SOLUTION INTRAVENOUS at 10:06

## 2024-06-22 RX ADMIN — NIFEDIPINE 30 MG: 30 TABLET, FILM COATED, EXTENDED RELEASE ORAL at 09:06

## 2024-06-22 RX ADMIN — TAMSULOSIN HYDROCHLORIDE 0.4 MG: 0.4 CAPSULE ORAL at 09:06

## 2024-06-22 RX ADMIN — SODIUM CHLORIDE, POTASSIUM CHLORIDE, SODIUM LACTATE AND CALCIUM CHLORIDE: 600; 310; 30; 20 INJECTION, SOLUTION INTRAVENOUS at 11:06

## 2024-06-22 RX ADMIN — APIXABAN 5 MG: 5 TABLET, FILM COATED ORAL at 09:06

## 2024-06-22 RX ADMIN — LEVOTHYROXINE SODIUM 150 MCG: 75 TABLET ORAL at 06:06

## 2024-06-22 NOTE — PROGRESS NOTES
[facility] Hospital Medicine Progress Note    Primary Team: [facility] Hospitalist Team A  Attending Physician: Kirsten Escalante MD  Resident: Raheem  Intern: Cruz    Subjective:      Continues to be intermittently febrile overnight. Resp panel neg and Bcx with NGTD. Patient denies any issues. No SOB, swelling, leg pain, CP, abdominal pain     Objective:     Last 24 Hour Vital Signs:  BP  Min: 125/64  Max: 150/67  Temp  Av.9 °F (37.7 °C)  Min: 98.4 °F (36.9 °C)  Max: 102.4 °F (39.1 °C)  Pulse  Av.9  Min: 69  Max: 114  Resp  Av.7  Min: 16  Max: 18  SpO2  Av.8 %  Min: 92 %  Max: 95 %  I/O last 3 completed shifts:  In: 1680 [P.O.:680; IV Piggyback:1000]  Out: 1300 [Urine:1300]    Physical Examination:  Gen: WDWN male in NAD  HENT: left facial droop noted as chronic. Tongue with continued dry appearance, dry mm. No goiter  CV: RRR, S1/S2 present, no m/r/g.    2+ radial pulses  Pulm: Rales have improved bilaterally, no wheezing, no respiratory distress  Abd: soft, non-tender, nondistended  Extremities: no edema or deformity. No tenderness to palpation to the BLE. Compartments soft throughout the BU/LE.   Neuro: A&O x3, able to lift self in bed. Facial asymmetry with L facial droop noted as chronic.     Laboratory:  Laboratory Data Reviewed: yes  Pertinent Findings:      Microbiology Data Reviewed: yes  Pertinent Findings:      Other Results:  EKG (my interpretation): .    Radiology Data Reviewed: yes  Pertinent Findings:      Current Medications:     Infusions:   lactated ringers   Intravenous Continuous 225 mL/hr at 24 1012 New Bag at 24 1012        Scheduled:   apixaban  5 mg Oral BID    azithromycin  500 mg Oral Daily    carvediloL  6.25 mg Oral BID    cefTRIAXone (Rocephin) IV (PEDS and ADULTS)  2 g Intravenous Q24H    levothyroxine  150 mcg Oral Before breakfast    NIFEdipine  30 mg Oral Daily    paroxetine  10 mg Oral QAM    tamsulosin  0.4 mg Oral QHS        PRN:    Current  Facility-Administered Medications:     acetaminophen, 650 mg, Oral, Q6H PRN    dextrose 10%, 12.5 g, Intravenous, PRN    dextrose 10%, 25 g, Intravenous, PRN    glucagon (human recombinant), 1 mg, Intramuscular, PRN    glucose, 16 g, Oral, PRN    glucose, 24 g, Oral, PRN    LIDOcaine, 1 patch, Transdermal, Daily PRN    naloxone, 0.02 mg, Intravenous, PRN    oxyCODONE, 5 mg, Oral, Q6H PRN    polyethylene glycol, 17 g, Oral, Daily PRN    senna-docusate 8.6-50 mg, 1 tablet, Oral, Nightly PRN    sodium chloride 0.9%, 10 mL, Intravenous, Q12H PRN    tiZANidine, 4 mg, Oral, Q8H PRN    Antibiotics and Day Number of Therapy:  Vanc 6/20-   Cefepime 6/20-  Flagyl 6/20-    Lines and Day Number of Therapy:  PIV    Assessment:   Deep Farah is a 73 y.o. male with PMH of HFrEF, pAfib, CAD, [place] palsy presents after being found lying in his yard today after syncopizing in the heat or falling from a chair in the yard and being unable to get off of the ground. History unfortunately limited as unwitnessed however patient A&O upon arrival to the ED. Tachycardic and given 1L NS. Discovered to have rhabdomyolysis with CK of >20,000 with renal injury with creatinine of 2.8 and last available 1.35 in system 1 year ago. No evidence of compartment syndrome on examination. Admitted to [facility] for treatment of rhabdomyolysis with acute kidney injury for further treatment with subsequent fever and initiation of broad spectrum antibiotics.     Plan:     Non-Traumatic Rhabdomyolysis with Stage 2 Acute Kidney Injury  CKD3A  Fall, unwitnessed  - suspect related to prolonged immobility on ground and possible heat injury; low suspicion for compartment syndrome at present.   - initial creatinine 2.8 on prior of 1.35 in 2023[place] [address]; got 1 L NS in ED  - trend BMP/CK q6 initially  - CK downtrending; continue BID trend  - Cr increased to 3.8 this morning despite IV, has good UOP  - Increase LR to 225 cc/hr for 6 hours before resuming 175  cc/hr  - Patient with strict instructions to notify Rn if SOB  - TTE without AS, 40-45% EF  - Given recent falls that are balance/mechanical suspect similar etiology although unclear history   - PT/OT for DME and ambulatory safety eval, recommended rolling walker with HH     Sepsis  RUQ Pain, resolved  CAP  - febrile overnight 6/20 resolving without intervention; still leukocytotic  - blood cx obtained and resp panel PCR  - getting IVF  - rpt CXR with LLL opacification c/w PNA; narrowed to rocephin and azithromycin   - No evidence of choleycystitis on RUQ US, vanc and flagyl stopped  - Continue CAP coverage    Transaminitis, downtrending  - believed 2/2 CK, checking Hep panel and RUQ US given fever  - monitor on rpt CMP  - transaminases and t bili downtrending; non-cholestatic pattern    Normocytic Anemia of Chronic Inflammation  - HGB 11.9.   - appears hemoconcentrated vs baseline  - iron panel, b12/folate consistent with AoCI      Hypertension  - hypertensive to 180s; did not take meds morning of admission  - on Valsartan 320 mg daily as outpatient; holding given DICK   - nifedipine 30 mg XR now and daily given DICK; continue Coreg     Paroxysmal Atrial fibrillation  - continue eliquis  - coreg 6.25 for rate control/HTN      Heart Failure with Reduced Ejection Fraction, NYHA Class 1  Status post AICD  - EF 25-30% on TTE 5/2023; asymptomatic at baseline  - NYHA class 1 per report  - holding Valsartan and aldactone given DICK; continue Coreg currently  - Repeat TTE with improved EF 40-45%, can continue IVF as no evidence of fluid overload     Coronary Artery Disease of native vessel of native heart  Hyperlipidemia  - hold crestor 5 mg given transaminitis     Hypothyroidism, acquired  - continue levothyroxine 150 mcg QD  - check TSH/FT4     Benign Prostatic Hypertrophy  - continue flomax 0.4 mg nightly        Mood Disorder  - continue paroxetine      Diet: cardiac  VTE Ppx: eliquis  Code: Full  Dispo: continue med/tele  shay Roach DO  [facility] Internal Medicine, HO-II     [facility] Medicine Hospitalist Pager numbers:   [facility] Hospitalist Medicine Team A (Marli/Boris): [phone]  [facility] Hospitalist Medicine Team B (Danie/Chani):  [phone]

## 2024-06-23 PROBLEM — R19.7 DIARRHEA: Status: ACTIVE | Noted: 2024-06-23

## 2024-06-23 LAB
ALBUMIN SERPL BCP-MCNC: 2.1 G/DL (ref 3.5–5.2)
ALP SERPL-CCNC: 80 U/L (ref 55–135)
ALT SERPL W/O P-5'-P-CCNC: 126 U/L (ref 10–44)
ANION GAP SERPL CALC-SCNC: 11 MMOL/L (ref 8–16)
ANION GAP SERPL CALC-SCNC: 11 MMOL/L (ref 8–16)
ANION GAP SERPL CALC-SCNC: 12 MMOL/L (ref 8–16)
ANION GAP SERPL CALC-SCNC: 13 MMOL/L (ref 8–16)
AST SERPL-CCNC: 235 U/L (ref 10–40)
BACTERIA #/AREA URNS HPF: ABNORMAL /HPF
BASOPHILS # BLD AUTO: 0.03 K/UL (ref 0–0.2)
BASOPHILS NFR BLD: 0.3 % (ref 0–1.9)
BILIRUB SERPL-MCNC: 0.5 MG/DL (ref 0.1–1)
BILIRUB UR QL STRIP: NEGATIVE
BUN SERPL-MCNC: 74 MG/DL (ref 8–23)
BUN SERPL-MCNC: 74 MG/DL (ref 8–23)
BUN SERPL-MCNC: 77 MG/DL (ref 8–23)
BUN SERPL-MCNC: 77 MG/DL (ref 8–23)
CALCIUM SERPL-MCNC: 8.6 MG/DL (ref 8.7–10.5)
CHLORIDE SERPL-SCNC: 103 MMOL/L (ref 95–110)
CHLORIDE SERPL-SCNC: 104 MMOL/L (ref 95–110)
CHLORIDE SERPL-SCNC: 106 MMOL/L (ref 95–110)
CHLORIDE SERPL-SCNC: 106 MMOL/L (ref 95–110)
CK SERPL-CCNC: 4061 U/L (ref 20–200)
CK SERPL-CCNC: 5256 U/L (ref 20–200)
CLARITY UR: ABNORMAL
CO2 SERPL-SCNC: 18 MMOL/L (ref 23–29)
CO2 SERPL-SCNC: 18 MMOL/L (ref 23–29)
CO2 SERPL-SCNC: 19 MMOL/L (ref 23–29)
CO2 SERPL-SCNC: 19 MMOL/L (ref 23–29)
COLOR UR: YELLOW
CREAT SERPL-MCNC: 4.1 MG/DL (ref 0.5–1.4)
CREAT SERPL-MCNC: 4.4 MG/DL (ref 0.5–1.4)
CREAT SERPL-MCNC: 4.6 MG/DL (ref 0.5–1.4)
CREAT SERPL-MCNC: 4.6 MG/DL (ref 0.5–1.4)
DIFFERENTIAL METHOD BLD: ABNORMAL
EOSINOPHIL # BLD AUTO: 0.3 K/UL (ref 0–0.5)
EOSINOPHIL NFR BLD: 2.2 % (ref 0–8)
ERYTHROCYTE [DISTWIDTH] IN BLOOD BY AUTOMATED COUNT: 14.6 % (ref 11.5–14.5)
EST. GFR  (NO RACE VARIABLE): 13 ML/MIN/1.73 M^2
EST. GFR  (NO RACE VARIABLE): 14 ML/MIN/1.73 M^2
GLUCOSE SERPL-MCNC: 100 MG/DL (ref 70–110)
GLUCOSE SERPL-MCNC: 100 MG/DL (ref 70–110)
GLUCOSE SERPL-MCNC: 119 MG/DL (ref 70–110)
GLUCOSE SERPL-MCNC: 127 MG/DL (ref 70–110)
GLUCOSE UR QL STRIP: NEGATIVE
HCT VFR BLD AUTO: 28.6 % (ref 40–54)
HGB BLD-MCNC: 9.3 G/DL (ref 14–18)
HGB UR QL STRIP: ABNORMAL
HYALINE CASTS #/AREA URNS LPF: 0 /LPF
IMM GRANULOCYTES # BLD AUTO: 0.13 K/UL (ref 0–0.04)
IMM GRANULOCYTES NFR BLD AUTO: 1.1 % (ref 0–0.5)
KETONES UR QL STRIP: NEGATIVE
LEUKOCYTE ESTERASE UR QL STRIP: NEGATIVE
LYMPHOCYTES # BLD AUTO: 0.4 K/UL (ref 1–4.8)
LYMPHOCYTES NFR BLD: 3.5 % (ref 18–48)
MAGNESIUM SERPL-MCNC: 2 MG/DL (ref 1.6–2.6)
MCH RBC QN AUTO: 27.3 PG (ref 27–31)
MCHC RBC AUTO-ENTMCNC: 32.5 G/DL (ref 32–36)
MCV RBC AUTO: 84 FL (ref 82–98)
MICROSCOPIC COMMENT: ABNORMAL
MONOCYTES # BLD AUTO: 0.8 K/UL (ref 0.3–1)
MONOCYTES NFR BLD: 7.1 % (ref 4–15)
NEUTROPHILS # BLD AUTO: 10 K/UL (ref 1.8–7.7)
NEUTROPHILS NFR BLD: 85.8 % (ref 38–73)
NITRITE UR QL STRIP: NEGATIVE
NRBC BLD-RTO: 0 /100 WBC
PH UR STRIP: 6 [PH] (ref 5–8)
PHOSPHATE SERPL-MCNC: 4.4 MG/DL (ref 2.7–4.5)
PLATELET # BLD AUTO: 145 K/UL (ref 150–450)
PMV BLD AUTO: 12.3 FL (ref 9.2–12.9)
POTASSIUM SERPL-SCNC: 3.3 MMOL/L (ref 3.5–5.1)
POTASSIUM SERPL-SCNC: 3.7 MMOL/L (ref 3.5–5.1)
POTASSIUM SERPL-SCNC: 3.7 MMOL/L (ref 3.5–5.1)
POTASSIUM SERPL-SCNC: 4 MMOL/L (ref 3.5–5.1)
PROT SERPL-MCNC: 6.4 G/DL (ref 6–8.4)
PROT UR QL STRIP: ABNORMAL
RBC # BLD AUTO: 3.41 M/UL (ref 4.6–6.2)
RBC #/AREA URNS HPF: 7 /HPF (ref 0–4)
SODIUM SERPL-SCNC: 135 MMOL/L (ref 136–145)
SP GR UR STRIP: 1.01 (ref 1–1.03)
SQUAMOUS #/AREA URNS HPF: 1 /HPF
URN SPEC COLLECT METH UR: ABNORMAL
UROBILINOGEN UR STRIP-ACNC: NEGATIVE EU/DL
WBC # BLD AUTO: 11.66 K/UL (ref 3.9–12.7)
WBC #/AREA URNS HPF: 5 /HPF (ref 0–5)

## 2024-06-23 PROCEDURE — 36415 COLL VENOUS BLD VENIPUNCTURE: CPT

## 2024-06-23 PROCEDURE — 25000003 PHARM REV CODE 250

## 2024-06-23 PROCEDURE — 63600175 PHARM REV CODE 636 W HCPCS

## 2024-06-23 PROCEDURE — 80053 COMPREHEN METABOLIC PANEL: CPT | Performed by: STUDENT IN AN ORGANIZED HEALTH CARE EDUCATION/TRAINING PROGRAM

## 2024-06-23 PROCEDURE — 84100 ASSAY OF PHOSPHORUS: CPT | Performed by: STUDENT IN AN ORGANIZED HEALTH CARE EDUCATION/TRAINING PROGRAM

## 2024-06-23 PROCEDURE — 63700000 PHARM REV CODE 250 ALT 637 W/O HCPCS: Performed by: STUDENT IN AN ORGANIZED HEALTH CARE EDUCATION/TRAINING PROGRAM

## 2024-06-23 PROCEDURE — 63600175 PHARM REV CODE 636 W HCPCS: Performed by: STUDENT IN AN ORGANIZED HEALTH CARE EDUCATION/TRAINING PROGRAM

## 2024-06-23 PROCEDURE — 11000001 HC ACUTE MED/SURG PRIVATE ROOM

## 2024-06-23 PROCEDURE — 36415 COLL VENOUS BLD VENIPUNCTURE: CPT | Mod: XB | Performed by: STUDENT IN AN ORGANIZED HEALTH CARE EDUCATION/TRAINING PROGRAM

## 2024-06-23 PROCEDURE — 25000003 PHARM REV CODE 250: Performed by: STUDENT IN AN ORGANIZED HEALTH CARE EDUCATION/TRAINING PROGRAM

## 2024-06-23 PROCEDURE — 80048 BASIC METABOLIC PNL TOTAL CA: CPT | Mod: XB

## 2024-06-23 PROCEDURE — 85025 COMPLETE CBC W/AUTO DIFF WBC: CPT | Performed by: STUDENT IN AN ORGANIZED HEALTH CARE EDUCATION/TRAINING PROGRAM

## 2024-06-23 PROCEDURE — 81000 URINALYSIS NONAUTO W/SCOPE: CPT | Performed by: STUDENT IN AN ORGANIZED HEALTH CARE EDUCATION/TRAINING PROGRAM

## 2024-06-23 PROCEDURE — 83735 ASSAY OF MAGNESIUM: CPT | Performed by: STUDENT IN AN ORGANIZED HEALTH CARE EDUCATION/TRAINING PROGRAM

## 2024-06-23 PROCEDURE — 82550 ASSAY OF CK (CPK): CPT | Mod: 91 | Performed by: STUDENT IN AN ORGANIZED HEALTH CARE EDUCATION/TRAINING PROGRAM

## 2024-06-23 PROCEDURE — 80048 BASIC METABOLIC PNL TOTAL CA: CPT | Mod: 91,XB | Performed by: STUDENT IN AN ORGANIZED HEALTH CARE EDUCATION/TRAINING PROGRAM

## 2024-06-23 RX ORDER — LOPERAMIDE HYDROCHLORIDE 2 MG/1
2 CAPSULE ORAL 3 TIMES DAILY
Status: COMPLETED | OUTPATIENT
Start: 2024-06-23 | End: 2024-06-23

## 2024-06-23 RX ORDER — SODIUM CHLORIDE, SODIUM LACTATE, POTASSIUM CHLORIDE, CALCIUM CHLORIDE 600; 310; 30; 20 MG/100ML; MG/100ML; MG/100ML; MG/100ML
INJECTION, SOLUTION INTRAVENOUS CONTINUOUS
Status: DISCONTINUED | OUTPATIENT
Start: 2024-06-23 | End: 2024-06-25 | Stop reason: HOSPADM

## 2024-06-23 RX ADMIN — PAROXETINE 10 MG: 10 TABLET, FILM COATED ORAL at 06:06

## 2024-06-23 RX ADMIN — APIXABAN 5 MG: 5 TABLET, FILM COATED ORAL at 08:06

## 2024-06-23 RX ADMIN — CARVEDILOL 6.25 MG: 6.25 TABLET, FILM COATED ORAL at 09:06

## 2024-06-23 RX ADMIN — TAMSULOSIN HYDROCHLORIDE 0.4 MG: 0.4 CAPSULE ORAL at 08:06

## 2024-06-23 RX ADMIN — SODIUM CHLORIDE, POTASSIUM CHLORIDE, SODIUM LACTATE AND CALCIUM CHLORIDE: 600; 310; 30; 20 INJECTION, SOLUTION INTRAVENOUS at 09:06

## 2024-06-23 RX ADMIN — NIFEDIPINE 30 MG: 30 TABLET, FILM COATED, EXTENDED RELEASE ORAL at 09:06

## 2024-06-23 RX ADMIN — SODIUM CHLORIDE, POTASSIUM CHLORIDE, SODIUM LACTATE AND CALCIUM CHLORIDE: 600; 310; 30; 20 INJECTION, SOLUTION INTRAVENOUS at 04:06

## 2024-06-23 RX ADMIN — SODIUM CHLORIDE, POTASSIUM CHLORIDE, SODIUM LACTATE AND CALCIUM CHLORIDE: 600; 310; 30; 20 INJECTION, SOLUTION INTRAVENOUS at 01:06

## 2024-06-23 RX ADMIN — APIXABAN 5 MG: 5 TABLET, FILM COATED ORAL at 09:06

## 2024-06-23 RX ADMIN — AZITHROMYCIN DIHYDRATE 500 MG: 250 TABLET ORAL at 09:06

## 2024-06-23 RX ADMIN — CARVEDILOL 6.25 MG: 6.25 TABLET, FILM COATED ORAL at 08:06

## 2024-06-23 RX ADMIN — POTASSIUM BICARBONATE 50 MEQ: 978 TABLET, EFFERVESCENT ORAL at 12:06

## 2024-06-23 RX ADMIN — LEVOTHYROXINE SODIUM 150 MCG: 75 TABLET ORAL at 05:06

## 2024-06-23 RX ADMIN — CEFTRIAXONE SODIUM 2 G: 2 INJECTION, POWDER, FOR SOLUTION INTRAMUSCULAR; INTRAVENOUS at 09:06

## 2024-06-23 RX ADMIN — LOPERAMIDE HYDROCHLORIDE 2 MG: 2 CAPSULE ORAL at 10:06

## 2024-06-23 RX ADMIN — SODIUM CHLORIDE, POTASSIUM CHLORIDE, SODIUM LACTATE AND CALCIUM CHLORIDE: 600; 310; 30; 20 INJECTION, SOLUTION INTRAVENOUS at 06:06

## 2024-06-23 RX ADMIN — LOPERAMIDE HYDROCHLORIDE 2 MG: 2 CAPSULE ORAL at 02:06

## 2024-06-23 RX ADMIN — LOPERAMIDE HYDROCHLORIDE 2 MG: 2 CAPSULE ORAL at 08:06

## 2024-06-23 NOTE — PLAN OF CARE
Problem: Adult Inpatient Plan of Care  Goal: Plan of Care Review  Outcome: Progressing  Goal: Patient-Specific Goal (Individualized)  Outcome: Progressing  Goal: Absence of Hospital-Acquired Illness or Injury  Outcome: Progressing  Goal: Optimal Comfort and Wellbeing  Outcome: Progressing  Goal: Readiness for Transition of Care  Outcome: Progressing     Problem: Acute Kidney Injury/Impairment  Goal: Fluid and Electrolyte Balance  Outcome: Progressing  Goal: Improved Oral Intake  Outcome: Progressing  Goal: Effective Renal Function  Outcome: Progressing     Problem: Fluid Volume Deficit  Goal: Fluid Balance  Outcome: Progressing     Problem: Fall Injury Risk  Goal: Absence of Fall and Fall-Related Injury  Outcome: Progressing     Problem: Comorbidity Management  Goal: Maintenance of Heart Failure Symptom Control  Outcome: Progressing  Goal: Blood Pressure in Desired Range  Outcome: Progressing     Problem: Fatigue  Goal: Improved Activity Tolerance  Outcome: Progressing     Problem: Pneumonia  Goal: Fluid Balance  Outcome: Progressing  Goal: Resolution of Infection Signs and Symptoms  Outcome: Progressing  Goal: Effective Oxygenation and Ventilation  Outcome: Progressing

## 2024-06-23 NOTE — PROGRESS NOTES
[facility] Medicine Progress Note    Primary Team: [facility] Hospitalist Team A  Attending Physician: Kirsten Escalante MD  Resident: Raheem  Intern: Cruz    Subjective:      NAONE. Has been urinating consistently throughout the day per pt and nursing staff. Has reported loose stools, not watery. No muscle pain, back pain, dysuria. Believes urine is clearing up some     Objective:     Last 24 Hour Vital Signs:  BP  Min: 112/59  Max: 129/58  Temp  Av.8 °F (36.6 °C)  Min: 97 °F (36.1 °C)  Max: 98.4 °F (36.9 °C)  Pulse  Av.8  Min: 58  Max: 78  Resp  Av  Min: 18  Max: 20  SpO2  Av.3 %  Min: 92 %  Max: 96 %  I/O last 3 completed shifts:  In: 5102.8 [P.O.:320; I.V.:4589; IV Piggyback:193.8]  Out: 550 [Urine:550]    Physical Examination:  Gen: WDWN male in NAD  HENT: left facial droop noted as chronic. MMM. EOMI, No goiter  CV: RRR, S1/S2 present, no m/r/g.    2+ radial pulses  Pulm: CTAB, no wheezing, no respiratory distress  Abd: soft, non-tender, nondistended  Extremities: no edema or deformity. No tenderness to palpation to the BLE. Compartments soft throughout the BU/LE.   Neuro: A&O x3, able to lift self in bed. Facial asymmetry with L facial droop noted as chronic.     Laboratory:  Laboratory Data Reviewed: yes  Pertinent Findings:      Microbiology Data Reviewed: yes  Pertinent Findings:      Other Results:  EKG (my interpretation): .    Radiology Data Reviewed: yes  Pertinent Findings:      Current Medications:     Infusions:   lactated ringers   Intravenous Continuous 225 mL/hr at 24 0942 New Bag at 24 0942        Scheduled:   apixaban  5 mg Oral BID    carvediloL  6.25 mg Oral BID    cefTRIAXone (Rocephin) IV (PEDS and ADULTS)  2 g Intravenous Q24H    levothyroxine  150 mcg Oral Before breakfast    NIFEdipine  30 mg Oral Daily    paroxetine  10 mg Oral QAM    tamsulosin  0.4 mg Oral QHS        PRN:    Current Facility-Administered Medications:     acetaminophen, 650 mg, Oral, Q6H  PRN    dextrose 10%, 12.5 g, Intravenous, PRN    dextrose 10%, 25 g, Intravenous, PRN    glucagon (human recombinant), 1 mg, Intramuscular, PRN    glucose, 16 g, Oral, PRN    glucose, 24 g, Oral, PRN    LIDOcaine, 1 patch, Transdermal, Daily PRN    loperamide, 2 mg, Oral, BID PRN    naloxone, 0.02 mg, Intravenous, PRN    oxyCODONE, 5 mg, Oral, Q6H PRN    polyethylene glycol, 17 g, Oral, Daily PRN    senna-docusate 8.6-50 mg, 1 tablet, Oral, Nightly PRN    sodium chloride 0.9%, 10 mL, Intravenous, Q12H PRN    tiZANidine, 4 mg, Oral, Q8H PRN    Antibiotics and Day Number of Therapy:  Vanc 6/20-   Cefepime 6/20-  Flagyl 6/20-    Lines and Day Number of Therapy:  PIV    Assessment:   Deep Farah is a 73 y.o. male with PMH of HFrEF, pAfib, CAD, [place] palsy presents after being found lying in his yard today after syncopizing in the heat or falling from a chair in the yard and being unable to get off of the ground. History unfortunately limited as unwitnessed however patient A&O upon arrival to the ED. Tachycardic and given 1L NS. Discovered to have rhabdomyolysis with CK of >20,000 with renal injury with creatinine of 2.8 and last available 1.35 in system 1 year ago. No evidence of compartment syndrome on examination. Admitted to [facility] for treatment of rhabdomyolysis with acute kidney injury for further treatment with subsequent fever and initiation of broad spectrum antibiotics.     Plan:     Non-Traumatic Rhabdomyolysis with Stage 2 Acute Kidney Injury  CKD3A  Fall, unwitnessed  - suspect related to prolonged immobility on ground and possible heat injury; low suspicion for compartment syndrome at present.   - initial creatinine 2.8 on prior of 1.35 in 2023; [place] [address]; got 1 L NS in ED  - trend BMP/CK q6 initially  - Patient with strict instructions to notify Rn if SOB  - TTE without AS, 40-45% EF  - Given recent falls that are balance/mechanical suspect similar etiology although unclear history   - PT/OT for  DME and ambulatory safety eval, recommended rolling walker with HH   - Cr stable at 4.7, CK continuing to down trend. Bicarb 18 on 6/23, was 19 on day prior. Has good urine output. No hyperkalemia  - Continue IVF at 225 cc/hr for 1 day, no SOB or concerns about volume overload at this time  - Repeat BMP at noon to eval acidosis to determine need to for 1/2NS+bicarb    Sepsis  RUQ Pain, resolved  CAP  - febrile overnight 6/20 resolving without intervention; still leukocytotic  - blood cx obtained and resp panel PCR  - getting IVF  - rpt CXR with LLL opacification c/w PNA; narrowed to rocephin and azithromycin   - No evidence of choleycystitis on RUQ US, vanc and flagyl stopped  - Continue CAP coverage, completed azithro    Transaminitis, downtrending  - believed 2/2 CK, checking Hep panel and RUQ US given fever  - monitor on rpt CMP  - transaminases and t bili downtrending; non-cholestatic pattern    Normocytic Anemia of Chronic Inflammation  - HGB 11.9.   - appears hemoconcentrated vs baseline  - iron panel, b12/folate consistent with AoCI      Hypertension  - hypertensive to 180s; did not take meds morning of admission  - on Valsartan 320 mg daily as outpatient; holding given DICK   - nifedipine 30 mg XR now and daily given DICK; continue Coreg     Paroxysmal Atrial fibrillation  - continue eliquis  - coreg 6.25 for rate control/HTN      Heart Failure with Reduced Ejection Fraction, NYHA Class 1  Status post AICD  - EF 25-30% on TTE 5/2023; asymptomatic at baseline  - NYHA class 1 per report  - holding Valsartan and aldactone given DICK; continue Coreg currently  - Repeat TTE with improved EF 40-45%, can continue IVF as no evidence of fluid overload     Coronary Artery Disease of native vessel of native heart  Hyperlipidemia  - hold crestor 5 mg given transaminitis     Hypothyroidism, acquired  - continue levothyroxine 150 mcg QD  - check TSH/FT4     Benign Prostatic Hypertrophy  - continue flomax 0.4 mg nightly         Mood Disorder  - continue paroxetine      Diet: cardiac  VTE Ppx: eliquis  Code: Full  Dispo: continue med/tele care, possible discharge in 1-2 days pending improvement of Cr    Anuj Roach DO  [facility], [facility] Hospitalist Pager numbers:   [facility] Hospitalist Medicine Team A (Marli/Boris): [phone]  [facility] Hospitalist Medicine Team B (Danie/Chani):  [phone]

## 2024-06-24 PROBLEM — E80.6 HYPERBILIRUBINEMIA: Status: RESOLVED | Noted: 2024-06-21 | Resolved: 2024-06-24

## 2024-06-24 PROBLEM — R50.9 FEVER: Status: RESOLVED | Noted: 2024-06-21 | Resolved: 2024-06-24

## 2024-06-24 LAB
ALBUMIN SERPL BCP-MCNC: 2 G/DL (ref 3.5–5.2)
ALP SERPL-CCNC: 99 U/L (ref 55–135)
ALT SERPL W/O P-5'-P-CCNC: 125 U/L (ref 10–44)
ANION GAP SERPL CALC-SCNC: 12 MMOL/L (ref 8–16)
ANION GAP SERPL CALC-SCNC: 12 MMOL/L (ref 8–16)
AST SERPL-CCNC: 197 U/L (ref 10–40)
BASOPHILS # BLD AUTO: 0.03 K/UL (ref 0–0.2)
BASOPHILS NFR BLD: 0.3 % (ref 0–1.9)
BILIRUB SERPL-MCNC: 0.6 MG/DL (ref 0.1–1)
BUN SERPL-MCNC: 75 MG/DL (ref 8–23)
BUN SERPL-MCNC: 75 MG/DL (ref 8–23)
CALCIUM SERPL-MCNC: 8.8 MG/DL (ref 8.7–10.5)
CALCIUM SERPL-MCNC: 8.8 MG/DL (ref 8.7–10.5)
CHLORIDE SERPL-SCNC: 106 MMOL/L (ref 95–110)
CHLORIDE SERPL-SCNC: 106 MMOL/L (ref 95–110)
CK SERPL-CCNC: 3022 U/L (ref 20–200)
CO2 SERPL-SCNC: 19 MMOL/L (ref 23–29)
CO2 SERPL-SCNC: 19 MMOL/L (ref 23–29)
CREAT SERPL-MCNC: 3.6 MG/DL (ref 0.5–1.4)
CREAT SERPL-MCNC: 3.6 MG/DL (ref 0.5–1.4)
DIFFERENTIAL METHOD BLD: ABNORMAL
EOSINOPHIL # BLD AUTO: 0.3 K/UL (ref 0–0.5)
EOSINOPHIL NFR BLD: 3.1 % (ref 0–8)
ERYTHROCYTE [DISTWIDTH] IN BLOOD BY AUTOMATED COUNT: 14.4 % (ref 11.5–14.5)
EST. GFR  (NO RACE VARIABLE): 17 ML/MIN/1.73 M^2
EST. GFR  (NO RACE VARIABLE): 17 ML/MIN/1.73 M^2
GLUCOSE SERPL-MCNC: 93 MG/DL (ref 70–110)
GLUCOSE SERPL-MCNC: 93 MG/DL (ref 70–110)
HCT VFR BLD AUTO: 27.5 % (ref 40–54)
HGB BLD-MCNC: 9.1 G/DL (ref 14–18)
IMM GRANULOCYTES # BLD AUTO: 0.14 K/UL (ref 0–0.04)
IMM GRANULOCYTES NFR BLD AUTO: 1.4 % (ref 0–0.5)
LDH SERPL L TO P-CCNC: 473 U/L (ref 110–260)
LYMPHOCYTES # BLD AUTO: 0.4 K/UL (ref 1–4.8)
LYMPHOCYTES NFR BLD: 4.2 % (ref 18–48)
MAGNESIUM SERPL-MCNC: 2 MG/DL (ref 1.6–2.6)
MCH RBC QN AUTO: 27.2 PG (ref 27–31)
MCHC RBC AUTO-ENTMCNC: 33.1 G/DL (ref 32–36)
MCV RBC AUTO: 82 FL (ref 82–98)
MONOCYTES # BLD AUTO: 0.7 K/UL (ref 0.3–1)
MONOCYTES NFR BLD: 6.6 % (ref 4–15)
NEUTROPHILS # BLD AUTO: 8.4 K/UL (ref 1.8–7.7)
NEUTROPHILS NFR BLD: 84.4 % (ref 38–73)
NRBC BLD-RTO: 0 /100 WBC
PHOSPHATE SERPL-MCNC: 3.4 MG/DL (ref 2.7–4.5)
PLATELET # BLD AUTO: 222 K/UL (ref 150–450)
PLATELET BLD QL SMEAR: ABNORMAL
PMV BLD AUTO: 12.1 FL (ref 9.2–12.9)
POTASSIUM SERPL-SCNC: 3.8 MMOL/L (ref 3.5–5.1)
POTASSIUM SERPL-SCNC: 3.8 MMOL/L (ref 3.5–5.1)
PROT SERPL-MCNC: 6.4 G/DL (ref 6–8.4)
RBC # BLD AUTO: 3.34 M/UL (ref 4.6–6.2)
SODIUM SERPL-SCNC: 137 MMOL/L (ref 136–145)
SODIUM SERPL-SCNC: 137 MMOL/L (ref 136–145)
WBC # BLD AUTO: 9.92 K/UL (ref 3.9–12.7)

## 2024-06-24 PROCEDURE — 83735 ASSAY OF MAGNESIUM: CPT | Performed by: STUDENT IN AN ORGANIZED HEALTH CARE EDUCATION/TRAINING PROGRAM

## 2024-06-24 PROCEDURE — 80053 COMPREHEN METABOLIC PANEL: CPT | Performed by: STUDENT IN AN ORGANIZED HEALTH CARE EDUCATION/TRAINING PROGRAM

## 2024-06-24 PROCEDURE — 63600175 PHARM REV CODE 636 W HCPCS: Performed by: STUDENT IN AN ORGANIZED HEALTH CARE EDUCATION/TRAINING PROGRAM

## 2024-06-24 PROCEDURE — 36415 COLL VENOUS BLD VENIPUNCTURE: CPT | Performed by: INTERNAL MEDICINE

## 2024-06-24 PROCEDURE — 85025 COMPLETE CBC W/AUTO DIFF WBC: CPT | Performed by: STUDENT IN AN ORGANIZED HEALTH CARE EDUCATION/TRAINING PROGRAM

## 2024-06-24 PROCEDURE — 84100 ASSAY OF PHOSPHORUS: CPT | Performed by: STUDENT IN AN ORGANIZED HEALTH CARE EDUCATION/TRAINING PROGRAM

## 2024-06-24 PROCEDURE — 97530 THERAPEUTIC ACTIVITIES: CPT | Mod: CQ

## 2024-06-24 PROCEDURE — 11000001 HC ACUTE MED/SURG PRIVATE ROOM

## 2024-06-24 PROCEDURE — 97116 GAIT TRAINING THERAPY: CPT | Mod: CQ

## 2024-06-24 PROCEDURE — 36415 COLL VENOUS BLD VENIPUNCTURE: CPT

## 2024-06-24 PROCEDURE — 63600175 PHARM REV CODE 636 W HCPCS

## 2024-06-24 PROCEDURE — 82550 ASSAY OF CK (CPK): CPT | Performed by: STUDENT IN AN ORGANIZED HEALTH CARE EDUCATION/TRAINING PROGRAM

## 2024-06-24 PROCEDURE — 83615 LACTATE (LD) (LDH) ENZYME: CPT | Performed by: INTERNAL MEDICINE

## 2024-06-24 PROCEDURE — 82085 ASSAY OF ALDOLASE: CPT

## 2024-06-24 PROCEDURE — 25000003 PHARM REV CODE 250: Performed by: STUDENT IN AN ORGANIZED HEALTH CARE EDUCATION/TRAINING PROGRAM

## 2024-06-24 RX ORDER — ROSUVASTATIN CALCIUM 5 MG/1
5 TABLET, COATED ORAL DAILY
Qty: 30 TABLET | Refills: 2 | Status: SHIPPED | OUTPATIENT
Start: 2024-07-01 | End: 2024-09-29

## 2024-06-24 RX ORDER — TAMSULOSIN HYDROCHLORIDE 0.4 MG/1
0.4 CAPSULE ORAL NIGHTLY
Qty: 30 CAPSULE | Refills: 2 | Status: SHIPPED | OUTPATIENT
Start: 2024-06-24 | End: 2024-09-22

## 2024-06-24 RX ORDER — VALSARTAN 320 MG/1
320 TABLET ORAL DAILY
Qty: 30 TABLET | Refills: 2 | Status: SHIPPED | OUTPATIENT
Start: 2024-07-01 | End: 2024-09-29

## 2024-06-24 RX ADMIN — CEFTRIAXONE SODIUM 2 G: 2 INJECTION, POWDER, FOR SOLUTION INTRAMUSCULAR; INTRAVENOUS at 09:06

## 2024-06-24 RX ADMIN — SODIUM CHLORIDE, POTASSIUM CHLORIDE, SODIUM LACTATE AND CALCIUM CHLORIDE: 600; 310; 30; 20 INJECTION, SOLUTION INTRAVENOUS at 05:06

## 2024-06-24 RX ADMIN — APIXABAN 5 MG: 5 TABLET, FILM COATED ORAL at 08:06

## 2024-06-24 RX ADMIN — NIFEDIPINE 30 MG: 30 TABLET, FILM COATED, EXTENDED RELEASE ORAL at 09:06

## 2024-06-24 RX ADMIN — PAROXETINE 10 MG: 10 TABLET, FILM COATED ORAL at 06:06

## 2024-06-24 RX ADMIN — TAMSULOSIN HYDROCHLORIDE 0.4 MG: 0.4 CAPSULE ORAL at 08:06

## 2024-06-24 RX ADMIN — SODIUM CHLORIDE, POTASSIUM CHLORIDE, SODIUM LACTATE AND CALCIUM CHLORIDE: 600; 310; 30; 20 INJECTION, SOLUTION INTRAVENOUS at 10:06

## 2024-06-24 RX ADMIN — CARVEDILOL 6.25 MG: 6.25 TABLET, FILM COATED ORAL at 08:06

## 2024-06-24 RX ADMIN — SODIUM CHLORIDE, POTASSIUM CHLORIDE, SODIUM LACTATE AND CALCIUM CHLORIDE: 600; 310; 30; 20 INJECTION, SOLUTION INTRAVENOUS at 02:06

## 2024-06-24 RX ADMIN — LEVOTHYROXINE SODIUM 150 MCG: 75 TABLET ORAL at 06:06

## 2024-06-24 RX ADMIN — CARVEDILOL 6.25 MG: 6.25 TABLET, FILM COATED ORAL at 09:06

## 2024-06-24 RX ADMIN — SODIUM CHLORIDE, POTASSIUM CHLORIDE, SODIUM LACTATE AND CALCIUM CHLORIDE: 600; 310; 30; 20 INJECTION, SOLUTION INTRAVENOUS at 08:06

## 2024-06-24 RX ADMIN — APIXABAN 5 MG: 5 TABLET, FILM COATED ORAL at 09:06

## 2024-06-24 NOTE — PLAN OF CARE
Patient A&Ox4. On Iv fluids and Iv antibiotics. No c/o pain or discomfort. Up in the chair during meals. Call light within reach. Bed alarm on.

## 2024-06-24 NOTE — PLAN OF CARE
In preparation of or for d[facility]c sw [facility] Cm will continue to follow pt through transitions of care and assist with any discharge needs.    Ramez Burleson, MSJARRETT  [phone]    Future Appointments   Date Time Provider Department Center   7/3/2024  9:00 AM Kelly Choi MD [facility]        06/24/24 3455   Post-Acute Status   Post-Acute Authorization Home Health   Home Health Status Referrals Sent   Coverage PHN   Discharge Delays None known at this time   Discharge Plan   Discharge Plan A Home Health

## 2024-06-24 NOTE — CONSULTS
[facility] Nephrology Consult Note    Reason for Consult:     COREY evaluation and management.    Assessment and Plan:   Deep Farah is a 73 y.o. male with PMH of HFrEF, pAfib, CAD, [place] palsy presents with a traumatic Rhabdo with CK > 20 k initially, Nephrology consulted for Corey management        # Non-Oliguric COREY on CKD 3a, 2/2 Rhabdomyolysis  - Pt w/ a baseline Cr ~ 1.3,Checked in July 2023, baseline GFR ~ 55  - BUN/Cr ~ 36/2.8 on admission suggesting Intrinsic renal  damage . Cr peaked to 4.7  - UA showing Pr and Bld, 7 RBCs, we will repeat UA to see if Blood and Protein persists after initial Fluid resuscitation  - FeNA ~ , FeUrea ~   - Renal US w/o evidence for obstruction or hydronephrosis  - Pt's COREY is mostly 2/2 ATN due to Rhabdomyolysis  - There is no acute indication for RRT at this time  - Recommend obtaining    - Daily Renal Function Panel  - Avoid Hypotension.  - Renally dose all meds  - Please avoid nephrotoxins, including NSAIDs, aminoglycosides, IV contrast (unless absolutely necessary), gadolinium, fleets and other phosphorous-based laxatives. Caution with antibiotic  - Pt is currently Euvolemic  -Plz Continue IVF (LR) @200 ml/hr unless Pt develop SOB/Volume overload  -Urine out put 1.8 L in last 24 hr, (but still Net Neg 117 ml)  -Encouraged oral intake      #Hypertension  Pls continue Nifedipine 30 mg XR  -Carvedilol 6.25 BID   -Agree with holding given COREY, was on Valsartan 320 mg daily as outpatient;   -Agree with hold Bumatanide 1 mg EOD( Pt was taking at home for (CHF)        Other problem, managed  as per Prim  Sepsis  RUQ Pain, resolved  CAP(rpt CXR with LLL opacification c/w PNA; narrowed to rocephin and azithromycin )  Transaminitis, downtrending   Paroxysmal Atrial fibrillati (Class 1, Status post AICD)  Heart Failure with Reduced Ejection Fraction, NYHA Class 1( TTE without AS, 40-45% EF w/ Repeat )  Status post AICD  Coronary Artery Disease of native vessel of native  heart  Hyperlipidemia  Hypothyroidism, acquired   Benign Prostatic Hypertrophy   Mood Disorder     Subjective:      History of Present Illness:  Deep Farah is a 73 y.o. male with PMH of HFrEF, pAfib, CAD, [place] palsy presents after being found lying in his yard today after syncopizing in the heat or falling from a chair in the yard and being unable to get off of the ground. History unfortunately limited as unwitnessed however patient A&O upon arrival to the ED. Tachycardic and given 1L NS. Discovered to have rhabdomyolysis with CK of >20,000 with renal injury with creatinine of 2.8 and last available 1.35 in system 1 year ago. No evidence of compartment syndrome on examination. Admitted to [facility] for treatment of rhabdomyolysis with acute kidney injury for further treatment with subsequent fever and initiation of broad spectrum antibiotics.   Nephrology consulted for Corey management    Past Medical History:  Past Medical History:   Diagnosis Date    Bell's palsy     Diverticulitis     Hypertension        Past Surgical History:  Past Surgical History:   Procedure Laterality Date    CARDIAC DEFIBRILLATOR PLACEMENT      PROSTATE SURGERY         Allergies:  Review of patient's allergies indicates:   Allergen Reactions    Pcn [penicillins] Swelling       Medications:   In-Hospital Scheduled Medications:   apixaban  5 mg Oral BID    carvediloL  6.25 mg Oral BID    cefTRIAXone (Rocephin) IV (PEDS and ADULTS)  2 g Intravenous Q24H    levothyroxine  150 mcg Oral Before breakfast    NIFEdipine  30 mg Oral Daily    paroxetine  10 mg Oral QAM    tamsulosin  0.4 mg Oral QHS      In-Hospital PRN Medications:    Current Facility-Administered Medications:     acetaminophen, 650 mg, Oral, Q6H PRN    dextrose 10%, 12.5 g, Intravenous, PRN    dextrose 10%, 25 g, Intravenous, PRN    glucagon (human recombinant), 1 mg, Intramuscular, PRN    glucose, 16 g, Oral, PRN    glucose, 24 g, Oral, PRN    LIDOcaine, 1 patch, Transdermal, Daily  PRN    naloxone, 0.02 mg, Intravenous, PRN    oxyCODONE, 5 mg, Oral, Q6H PRN    polyethylene glycol, 17 g, Oral, Daily PRN    senna-docusate 8.6-50 mg, 1 tablet, Oral, Nightly PRN    sodium chloride 0.9%, 10 mL, Intravenous, Q12H PRN    tiZANidine, 4 mg, Oral, Q8H PRN   In-Hospital IV Infusion Medications:   lactated ringers   Intravenous Continuous 200 mL/hr at 06/24/24 0705 Rate Change at 06/24/24 0705      Home Medications:  Prior to Admission medications    Medication Sig Start Date End Date Taking? Authorizing Provider   allopurinoL (ZYLOPRIM) 100 MG tablet Take 100 mg by mouth once daily. 5/14/24  Yes Provider, Historical   amLODIPine (NORVASC) 5 MG tablet  2/25/18  Yes Provider, Historical   carvediloL (COREG) 6.25 MG tablet Take 6.25 mg by mouth 2 (two) times daily. 4/8/24  Yes Provider, Historical   ELIQUIS 5 mg Tab Take 5 mg by mouth 2 (two) times daily.   Yes Provider, Historical   ferrous sulfate (HIGH POTENCY IRON) 27 mg iron Tab Take 1 tablet by mouth once daily.   Yes Provider, Historical   levothyroxine (SYNTHROID) 150 MCG tablet Take 150 mcg by mouth before breakfast. 5/12/24  Yes Provider, Historical   oxybutynin (DITROPAN-XL) 10 MG 24 hr tablet Take 10 mg by mouth once daily. 1/3/24  Yes Provider, Historical   paroxetine (PAXIL) 10 MG tablet Take 10 mg by mouth every morning.   Yes Provider, Historical   potassium chloride (KLOR-CON) 10 MEQ TbSR Take 10 mEq by mouth once daily.   Yes Provider, Historical   rosuvastatin (CRESTOR) 5 MG tablet Take 5 mg by mouth once daily. 5/12/24  Yes Provider, Historical   spironolactone (ALDACTONE) 25 MG tablet Take 25 mg by mouth once daily. 5/12/24  Yes Provider, Historical   tamsulosin (FLOMAX) 0.4 mg Cap Take 0.4 mg by mouth once daily.   Yes Provider, Historical   valsartan (DIOVAN) 320 MG tablet Take 320 mg by mouth once daily. 4/8/24  Yes Provider, Historical   HYDROcodone-acetaminophen (NORCO) 7.5-325 mg per tablet Take 1 tablet by mouth every 6 (six)  hours as needed for Pain. 23   Ilya Leal MD       Family History:  Family History   Problem Relation Name Age of Onset    Stroke Father      Hypertension Father      Hypertension Brother      Prostate cancer Brother      Prostate cancer Cousin         Social History:  Social History     Tobacco Use    Smoking status: Former    Smokeless tobacco: Never   Substance Use Topics    Alcohol use: Yes     Comment: 2 beers in a week    Drug use: No       Review of Systems:  Pertinent items are noted in HPI. All other systems are reviewed and are negative.       Objective:   Last 24 Hour Vital Signs:  BP  Min: 116/59  Max: 158/71  Temp  Av °F (36.7 °C)  Min: 97.5 °F (36.4 °C)  Max: 98.5 °F (36.9 °C)  Pulse  Av.2  Min: 70  Max: 84  Resp  Av.8  Min: 15  Max: 20  SpO2  Av.2 %  Min: 92 %  Max: 97 %  I/O last 3 completed shifts:  In: 3918.5 [I.V.:3856.6; IV Piggyback:61.9]  Out: 1850 [Urine:1850]    Physical Examination:  Vitals reviewed.   Constitutional:       Appearance: Normal appearance.   HENT:      Head: Normocephalic and atraumatic.   Cardiovascular:      Rate and Rhythm: Normal rate and regular rhythm.      Heart sounds: Normal heart sounds.   Pulmonary:      Effort: Pulmonary effort is normal.      Breath sounds: Normal breath sounds.   Abdominal:      General: Bowel sounds are normal.      Palpations: Abdomen is soft.   Musculoskeletal:         General: No swelling.   Skin:     General: Skin is warm and dry.   Neurological:      General: No focal deficit present.      Mental Status: He is alert and oriented to person, place, and time.      Motor: Weakness present.   Psychiatric:         Mood and Affect: Mood normal.         Behavior: Behavior normal.         Thought Content: Thought content normal.   Laboratory:  Most Recent Data:  CBC:   Lab Results   Component Value Date    WBC 9.92 2024    HGB 9.1 (L) 2024     2024    MCV 82 2024    RDW 14.4 2024      BMP:   Lab Results   Component Value Date     06/24/2024     06/24/2024    K 3.8 06/24/2024    K 3.8 06/24/2024     06/24/2024     06/24/2024    CO2 19 (L) 06/24/2024    CO2 19 (L) 06/24/2024    GLU 93 06/24/2024    GLU 93 06/24/2024    BUN 75 (H) 06/24/2024    BUN 75 (H) 06/24/2024    CREATININE 3.6 (H) 06/24/2024    CREATININE 3.6 (H) 06/24/2024    CALCIUM 8.8 06/24/2024    CALCIUM 8.8 06/24/2024    MG 2.0 06/24/2024    PHOS 3.4 06/24/2024     LFTs:   Lab Results   Component Value Date    PROT 6.4 06/24/2024    ALBUMIN 2.0 (L) 06/24/2024     (H) 06/24/2024    ALKPHOS 99 06/24/2024     (H) 06/24/2024     Anemia:   Lab Results   Component Value Date    IRON 12 (L) 06/20/2024    TIBC 195 (L) 06/20/2024    FERRITIN 468 (H) 06/20/2024    HNDQZHZL01 405 06/20/2024    FOLATE 15.0 06/20/2024     Urinalysis:   Lab Results   Component Value Date    COLORU Yellow 06/23/2024    SPECGRAV 1.015 06/23/2024    NITRITE Negative 06/23/2024    KETONESU Negative 06/23/2024    UROBILINOGEN Negative 06/23/2024    WBCUA 5 06/23/2024       Trended Lab Data:  Recent Labs   Lab 06/22/24  0330 06/22/24  1654 06/23/24  0412 06/23/24  1130 06/23/24  1723 06/24/24  0407   WBC 16.99*  --  11.66  --   --  9.92     --  145*  --   --  222   MCV 83  --  84  --   --  82   RDW 14.5  --  14.6*  --   --  14.4     136   < > 135*  135* 135* 135* 137  137   K 3.9  3.9   < > 3.7  3.7 3.3* 4.0 3.8  3.8     103   < > 106  106 104 103 106  106   CO2 19*  19*   < > 18*  18* 19* 19* 19*  19*   BUN 54*  54*   < > 74*  74* 77* 77* 75*  75*   CREATININE 3.8*  3.8*   < > 4.6*  4.6* 4.4* 4.1* 3.6*  3.6*   *  126*   < > 100  100 127* 119* 93  93   CALCIUM 9.0  9.0   < > 8.6*  8.6* 8.6* 8.6* 8.8  8.8   PROT 6.8  --  6.4  --   --  6.4   ALBUMIN 2.3*  --  2.1*  --   --  2.0*   *  --  235*  --   --  197*   ALKPHOS 74  --  80  --   --  99   *  --  126*  --   --  125*     < > = values in this interval not displayed.       Other Relevant Results:  Radiology:    EXAMINATION:  US RETROPERITONEAL COMPLETE     CLINICAL HISTORY:  Eval for obstruction; Acute kidney failure, unspecified     TECHNIQUE:  Ultrasound of the kidneys and urinary bladder was performed including color flow and Doppler evaluation of the kidneys.     COMPARISON:  Ultrasound abdomen 06/21/2024     FINDINGS:  Right kidney: The right kidney measures 12.0 cm. No cortical thinning.  There is increased renal cortical echogenicity.  No loss of corticomedullary distinction. Resistive index measures 0.71.  No mass. No renal stone. No hydronephrosis.     Left kidney: The left kidney measures 12.7 cm. No cortical thinning.  There is mild increased renal cortical echogenicity.  No loss of corticomedullary distinction. Resistive index measures 0.79.  No mass. No renal stone. No hydronephrosis.     The urinary bladder is decompressed, nonvisualized.     Impression:     Increased renal cortical echogenicity bilaterally suggests some degree of chronic medical renal disease.  Asymmetric elevation of the left renal resistive index as compared to the right may reflect acute on chronic process.  No hydronephrosis.        Electronically signed by:Remy Quinn MD  Date:                                            06/22/2024  Time:                                           15:03  Imaging has been reviewed personally.             Thank you for allowing us to participate in the care of this patient. Please contact me if you have any questions regarding this consult.    Aretha Mitchell MD  [facility], -[facility]

## 2024-06-24 NOTE — PROGRESS NOTES
[facility] Hospital Medicine Progress Note    Primary Team: [facility] Hospitalist Team A  Attending Physician: Kirsten Escalante MD  Resident: Raheem  Intern: Cruz    Subjective:      Reports urine clear, still with large volumes. No shortness of breath or PND. Feels well. No recurrent fever, no cough.      Objective:     Last 24 Hour Vital Signs:  BP  Min: 116/59  Max: 158/71  Temp  Av °F (36.7 °C)  Min: 97.5 °F (36.4 °C)  Max: 98.5 °F (36.9 °C)  Pulse  Av.2  Min: 70  Max: 84  Resp  Av.8  Min: 15  Max: 20  SpO2  Av.2 %  Min: 92 %  Max: 97 %  I/O last 3 completed shifts:  In: 3918.5 [I.V.:3856.6; IV Piggyback:61.9]  Out: 1850 [Urine:1850]    Physical Examination:  Gen: WDWN male in NAD  HENT: left facial droop noted as chronic. MMM. EOMI, No goiter  CV: RRR, S1/S2 present, no m/r/g.    2+ radial pulses  Pulm: LLL rales, no wheezing, no respiratory distress  Abd: soft, non-tender, nondistended  Extremities: no edema or deformity. No tenderness to palpation to the BLE. Compartments soft throughout the BU/LE.   Neuro: A&O x3, able to lift self in bed. Facial asymmetry with L facial droop noted as chronic.     Laboratory:  Laboratory Data Reviewed: yes  Pertinent Findings:      Microbiology Data Reviewed: yes  Pertinent Findings:      Other Results:  EKG (my interpretation): .    Radiology Data Reviewed: yes  Pertinent Findings:      Current Medications:     Infusions:   lactated ringers   Intravenous Continuous 200 mL/hr at 24 07 Rate Change at 24 07        Scheduled:   apixaban  5 mg Oral BID    carvediloL  6.25 mg Oral BID    cefTRIAXone (Rocephin) IV (PEDS and ADULTS)  2 g Intravenous Q24H    levothyroxine  150 mcg Oral Before breakfast    NIFEdipine  30 mg Oral Daily    paroxetine  10 mg Oral QAM    tamsulosin  0.4 mg Oral QHS        PRN:    Current Facility-Administered Medications:     acetaminophen, 650 mg, Oral, Q6H PRN    dextrose 10%, 12.5 g, Intravenous, PRN    dextrose 10%, 25 g,  Intravenous, PRN    glucagon (human recombinant), 1 mg, Intramuscular, PRN    glucose, 16 g, Oral, PRN    glucose, 24 g, Oral, PRN    LIDOcaine, 1 patch, Transdermal, Daily PRN    naloxone, 0.02 mg, Intravenous, PRN    oxyCODONE, 5 mg, Oral, Q6H PRN    polyethylene glycol, 17 g, Oral, Daily PRN    senna-docusate 8.6-50 mg, 1 tablet, Oral, Nightly PRN    sodium chloride 0.9%, 10 mL, Intravenous, Q12H PRN    tiZANidine, 4 mg, Oral, Q8H PRN    Antibiotics and Day Number of Therapy:  Vanc 6/20-   Cefepime 6/20-  Flagyl 6/20-    Lines and Day Number of Therapy:  PIV    Assessment:   Deep Farah is a 73 y.o. male with PMH of HFrEF, pAfib, CAD, Simms palsy presents after being found lying in his yard today after syncopizing in the heat or falling from a chair in the yard and being unable to get off of the ground. History unfortunately limited as unwitnessed however patient A&O upon arrival to the ED. Tachycardic and given 1L NS. Discovered to have rhabdomyolysis with CK of >20,000 with renal injury with creatinine of 2.8 and last available 1.35 in system 1 year ago. No evidence of compartment syndrome on examination. Admitted to [facility] for treatment of rhabdomyolysis with acute kidney injury for further treatment with subsequent fever and initiation of broad spectrum antibiotics.     Plan:     Non-Traumatic Rhabdomyolysis with Stage 2 Acute Kidney Injury, improving  CKD3A  Fall, unwitnessed  - suspect related to prolonged immobility on ground and possible heat injury; low suspicion for compartment syndrome at present.   - initial creatinine 2.8 on prior of 1.35 in 2023; [place] [address]; got 1 L NS in ED  - Patient with strict instructions to notify RN if SOB  - TTE without AS, 40-45% EF w/ Repeat  - Given recent falls that are balance/mechanical suspect similar etiology although unclear history, PNA as below may be contributing  - PT/OT for DME and ambulatory safety eval, recommended rolling walker with HH   - Cr peak  at 4.7 now downtrending, CK <5000, Bicarb remaining around 19  - Continue IVF at 200 cc/hr for additional day, no SOB or concerns about volume overload at this time      Sepsis  RUQ Pain, resolved  CAP  - febrile overnight 6/20 resolving without intervention; still leukocytotic  - blood cx obtained and resp panel PCR  - getting IVF  - rpt CXR with LLL opacification c/w PNA; narrowed to rocephin and azithromycin   - No evidence of choleycystitis on RUQ US, vanc and flagyl stopped  - Continue CAP coverage x5 total days of rocephin, completed azithro    Transaminitis, downtrending  - believed 2/2 CK, negative Hep panel and RUQ US given fever  - monitor on rpt CMP  - transaminases and t bili downtrending; non-cholestatic pattern    Normocytic Anemia of Chronic Inflammation  - HGB 11.9.   - appears hemoconcentrated vs baseline  - iron panel, b12/folate consistent with AoCI      Hypertension  - hypertensive to 180s; did not take meds morning of admission  - on Valsartan 320 mg daily as outpatient; holding given DICK   - nifedipine 30 mg XR now and daily given DICK; continue Coreg     Paroxysmal Atrial fibrillation  - continue eliquis  - coreg 6.25 for rate control/HTN      Heart Failure with Reduced Ejection Fraction, NYHA Class 1  Status post AICD  - EF 25-30% on TTE 5/2023; asymptomatic at baseline  - NYHA class 1 per report  - holding Valsartan and aldactone given DICK; continue Coreg currently  - Repeat TTE with improved EF 40-45%, can continue IVF as no evidence of fluid overload     Coronary Artery Disease of native vessel of native heart  Hyperlipidemia  - holding crestor 5 mg given transaminitis     Hypothyroidism, acquired  - continue levothyroxine 150 mcg QD  - check TSH/FT4     Benign Prostatic Hypertrophy  - continue flomax 0.4 mg nightly     Mood Disorder  - continue paroxetine      Diet: cardiac  VTE Ppx: eliquis  Code: Full  Dispo: continue med/tele care, possible discharge in 1-2 days pending improvement of  Esa Maciel, DO  [facility] Internal Medicine/Emergency Medicine -[facility] Medicine Hospitalist Pager numbers:   [facility] Hospitalist Medicine Team A (Marli/Boris): [phone]  [facility] Hospitalist Medicine Team B (Danie/Chani):  [phone]

## 2024-06-24 NOTE — PT/OT/SLP PROGRESS
Physical Therapy Treatment    Patient Name:  Deep Farah   MRN:  [id number]    Recommendations:     Discharge Recommendations:  low intensity therapy ( PT/OT)   Discharge Equipment Recommendations: none  Barriers to discharge:  decreased functional mobility    Assessment:     Deep Farah is a 73 y.o. male admitted with a medical diagnosis of Non-traumatic rhabdomyolysis.  He presents with the following impairments/functional limitations: weakness, impaired endurance, impaired self care skills, impaired functional mobility, gait instability, impaired balance, decreased lower extremity function, decreased ROM Pt would continue to benefit from P.T. To address impairments listed above.  .    Rehab Prognosis: Fair; patient would benefit from acute skilled PT services to address these deficits and reach maximum level of function.    Recent Surgery: * No surgery found *      Plan:     During this hospitalization, patient to be seen 3 x/week to address the identified rehab impairments via gait training, therapeutic activities, therapeutic exercises, neuromuscular re-education and progress toward the following goals:    Plan of Care Expires:  07/21/24    Subjective       Patient/Family Comments/goals: Pt agreed to tx.  Pain/Comfort:  Pain Rating 1: 0/10  Pain Rating Post-Intervention 1: 0/10      Objective:     Communicated with RN prior to session.  Patient found  sitting on toilet  with telemetry, peripheral IV upon PT entry to room.     General Precautions: Standard, fall  Orthopedic Precautions: N/A  Braces: N/A  Respiratory Status: Room air     Functional Mobility:  Bed Mobility:     Sit to Supine: supervision and stand by assistance  Transfers:     Sit to Stand:  from toilet with Anastasia with use of GB and RW.  From EOB with CGA and RW  Gait: 70ft x 2 with RW,  assist for IV pole, and CGA with vc's for proper foot placement inside RW.  Pt ambulated with feet out in front of walker.  Pt ambulated with B flexed knees,  Surgery results - I need authorization signed to see them    As to her persistent behavioral changes - what does dr dias say?  I am suspicious it may not be a clear \"medical\" etiology and that there may be more of a neuro/psych issue...       "vc's for slower pace for increased safety.  Pt demonstrates B genu varus and is also unable to extend knees in stance.  Balance: sitting good, standing fair/fair+, gait fair      AM-PAC 6 CLICK MOBILITY  Turning over in bed (including adjusting bedclothes, sheets and blankets)?: 3  Sitting down on and standing up from a chair with arms (e.g., wheelchair, bedside commode, etc.): 3  Moving from lying on back to sitting on the side of the bed?: 3  Moving to and from a bed to a chair (including a wheelchair)?: 3  Need to walk in hospital room?: 3  Climbing 3-5 steps with a railing?: 2  Basic Mobility Total Score: 17       Treatment & Education:  Pt found sitting on toilet and was able to perform self care to BM.  Standing from toilet with GB and RW with Anastasia and from EOB with RW and CGA with vc's for hand placement. Static standing with Rw and CGA/SBA while PTA assisted pt with donning a clean diaper.  Gait training as above. Pt stated he "When I get home I have to do some exercises to get my knees straight again."  PTA instructed pt in QS and to position BLEs floating on a pillow positioned below the knees and above the ankles with knees floating off pillow for gravity assisted passive stretch and heels floating off for pressure relief. Pt also instructed in QS while LEs are elevated for LE strengthening and stretch of hamstrings.  Pt was positioned supine with HOB elevated at end of tx.      Patient left HOB elevated with all lines intact, call button in reach, bed alarm on, and RN notified..    GOALS:   Multidisciplinary Problems       Physical Therapy Goals          Problem: Physical Therapy    Goal Priority Disciplines Outcome Goal Variances Interventions   Physical Therapy Goal     PT, PT/OT Progressing     Description: Goals to be met by: 24     Patient will increase functional independence with mobility by performin. Sit to stand transfer with Modified [place]  2. Bed to chair transfer with " Modified [place] using Rolling Walker  3. Gait  x 200 feet with Modified [place] using Rolling Walker.                          Time Tracking:     PT Received On: 06/24/24  PT Start Time: 1630     PT Stop Time: 1653  PT Total Time (min): 23 min     Billable Minutes: Gait Training 11 and Therapeutic Activity 12    Treatment Type: Treatment  PT/PTA: PTA     Number of PTA visits since last PT visit: 1 06/24/2024

## 2024-06-25 VITALS
RESPIRATION RATE: 18 BRPM | WEIGHT: 231 LBS | HEIGHT: 71 IN | HEART RATE: 88 BPM | SYSTOLIC BLOOD PRESSURE: 186 MMHG | TEMPERATURE: 98 F | BODY MASS INDEX: 32.34 KG/M2 | OXYGEN SATURATION: 93 % | DIASTOLIC BLOOD PRESSURE: 82 MMHG

## 2024-06-25 LAB
ALBUMIN SERPL BCP-MCNC: 2.2 G/DL (ref 3.5–5.2)
ALP SERPL-CCNC: 139 U/L (ref 55–135)
ALT SERPL W/O P-5'-P-CCNC: 125 U/L (ref 10–44)
ANION GAP SERPL CALC-SCNC: 8 MMOL/L (ref 8–16)
AST SERPL-CCNC: 153 U/L (ref 10–40)
BASOPHILS # BLD AUTO: 0.05 K/UL (ref 0–0.2)
BASOPHILS NFR BLD: 0.4 % (ref 0–1.9)
BILIRUB SERPL-MCNC: 0.5 MG/DL (ref 0.1–1)
BUN SERPL-MCNC: 62 MG/DL (ref 8–23)
CALCIUM SERPL-MCNC: 9 MG/DL (ref 8.7–10.5)
CHLORIDE SERPL-SCNC: 107 MMOL/L (ref 95–110)
CK SERPL-CCNC: 1538 U/L (ref 20–200)
CO2 SERPL-SCNC: 25 MMOL/L (ref 23–29)
CREAT SERPL-MCNC: 2.8 MG/DL (ref 0.5–1.4)
DIFFERENTIAL METHOD BLD: ABNORMAL
EOSINOPHIL # BLD AUTO: 0.2 K/UL (ref 0–0.5)
EOSINOPHIL NFR BLD: 1.9 % (ref 0–8)
ERYTHROCYTE [DISTWIDTH] IN BLOOD BY AUTOMATED COUNT: 14.3 % (ref 11.5–14.5)
EST. GFR  (NO RACE VARIABLE): 23 ML/MIN/1.73 M^2
GLUCOSE SERPL-MCNC: 93 MG/DL (ref 70–110)
HCT VFR BLD AUTO: 29 % (ref 40–54)
HGB BLD-MCNC: 9.4 G/DL (ref 14–18)
IMM GRANULOCYTES # BLD AUTO: 0.22 K/UL (ref 0–0.04)
IMM GRANULOCYTES NFR BLD AUTO: 1.7 % (ref 0–0.5)
LYMPHOCYTES # BLD AUTO: 0.4 K/UL (ref 1–4.8)
LYMPHOCYTES NFR BLD: 3.5 % (ref 18–48)
MAGNESIUM SERPL-MCNC: 1.7 MG/DL (ref 1.6–2.6)
MCH RBC QN AUTO: 26.9 PG (ref 27–31)
MCHC RBC AUTO-ENTMCNC: 32.4 G/DL (ref 32–36)
MCV RBC AUTO: 83 FL (ref 82–98)
MONOCYTES # BLD AUTO: 0.9 K/UL (ref 0.3–1)
MONOCYTES NFR BLD: 6.7 % (ref 4–15)
NEUTROPHILS # BLD AUTO: 10.8 K/UL (ref 1.8–7.7)
NEUTROPHILS NFR BLD: 85.8 % (ref 38–73)
NRBC BLD-RTO: 0 /100 WBC
PHOSPHATE SERPL-MCNC: 3.3 MG/DL (ref 2.7–4.5)
PLATELET # BLD AUTO: 262 K/UL (ref 150–450)
PMV BLD AUTO: 11.2 FL (ref 9.2–12.9)
POTASSIUM SERPL-SCNC: 4 MMOL/L (ref 3.5–5.1)
PROT SERPL-MCNC: 6.6 G/DL (ref 6–8.4)
RBC # BLD AUTO: 3.5 M/UL (ref 4.6–6.2)
SODIUM SERPL-SCNC: 140 MMOL/L (ref 136–145)
WBC # BLD AUTO: 12.62 K/UL (ref 3.9–12.7)

## 2024-06-25 PROCEDURE — 80053 COMPREHEN METABOLIC PANEL: CPT | Performed by: STUDENT IN AN ORGANIZED HEALTH CARE EDUCATION/TRAINING PROGRAM

## 2024-06-25 PROCEDURE — 63600175 PHARM REV CODE 636 W HCPCS: Performed by: STUDENT IN AN ORGANIZED HEALTH CARE EDUCATION/TRAINING PROGRAM

## 2024-06-25 PROCEDURE — 36415 COLL VENOUS BLD VENIPUNCTURE: CPT | Performed by: STUDENT IN AN ORGANIZED HEALTH CARE EDUCATION/TRAINING PROGRAM

## 2024-06-25 PROCEDURE — 84100 ASSAY OF PHOSPHORUS: CPT | Performed by: STUDENT IN AN ORGANIZED HEALTH CARE EDUCATION/TRAINING PROGRAM

## 2024-06-25 PROCEDURE — 85025 COMPLETE CBC W/AUTO DIFF WBC: CPT | Performed by: STUDENT IN AN ORGANIZED HEALTH CARE EDUCATION/TRAINING PROGRAM

## 2024-06-25 PROCEDURE — 83735 ASSAY OF MAGNESIUM: CPT | Performed by: STUDENT IN AN ORGANIZED HEALTH CARE EDUCATION/TRAINING PROGRAM

## 2024-06-25 PROCEDURE — 82550 ASSAY OF CK (CPK): CPT | Performed by: STUDENT IN AN ORGANIZED HEALTH CARE EDUCATION/TRAINING PROGRAM

## 2024-06-25 PROCEDURE — 25000003 PHARM REV CODE 250: Performed by: STUDENT IN AN ORGANIZED HEALTH CARE EDUCATION/TRAINING PROGRAM

## 2024-06-25 RX ORDER — SPIRONOLACTONE 25 MG/1
25 TABLET ORAL DAILY
Qty: 30 TABLET | Refills: 2 | Status: SHIPPED | OUTPATIENT
Start: 2024-07-01 | End: 2024-09-29

## 2024-06-25 RX ORDER — BUMETANIDE 1 MG/1
1 TABLET ORAL DAILY PRN
Qty: 30 TABLET | Refills: 11 | Status: SHIPPED | OUTPATIENT
Start: 2024-06-25 | End: 2025-06-25

## 2024-06-25 RX ADMIN — SODIUM CHLORIDE, POTASSIUM CHLORIDE, SODIUM LACTATE AND CALCIUM CHLORIDE: 600; 310; 30; 20 INJECTION, SOLUTION INTRAVENOUS at 02:06

## 2024-06-25 RX ADMIN — LEVOTHYROXINE SODIUM 150 MCG: 75 TABLET ORAL at 05:06

## 2024-06-25 RX ADMIN — SODIUM CHLORIDE, POTASSIUM CHLORIDE, SODIUM LACTATE AND CALCIUM CHLORIDE: 600; 310; 30; 20 INJECTION, SOLUTION INTRAVENOUS at 05:06

## 2024-06-25 RX ADMIN — APIXABAN 5 MG: 5 TABLET, FILM COATED ORAL at 09:06

## 2024-06-25 RX ADMIN — OXYCODONE HYDROCHLORIDE 5 MG: 5 TABLET ORAL at 02:06

## 2024-06-25 RX ADMIN — NIFEDIPINE 30 MG: 30 TABLET, FILM COATED, EXTENDED RELEASE ORAL at 09:06

## 2024-06-25 RX ADMIN — PAROXETINE 10 MG: 10 TABLET, FILM COATED ORAL at 05:06

## 2024-06-25 RX ADMIN — CEFTRIAXONE SODIUM 2 G: 2 INJECTION, POWDER, FOR SOLUTION INTRAMUSCULAR; INTRAVENOUS at 09:06

## 2024-06-25 RX ADMIN — CARVEDILOL 6.25 MG: 6.25 TABLET, FILM COATED ORAL at 09:06

## 2024-06-25 NOTE — PROGRESS NOTES
[facility] Nephrology Progress Note      Subjective:      Overnight Events:  Did well overnight with no acute events.  Otherwise denies recent CP, SOB, palp, abd pain, n/v/d, LE swelling.  Denies appetite disturbances or significant fatigue.      Objective:   Last 24 Hour Vital Signs:  BP  Min: 137/66  Max: 186/82  Temp  Av.8 °F (36.6 °C)  Min: 97.4 °F (36.3 °C)  Max: 98.2 °F (36.8 °C)  Pulse  Av.5  Min: 72  Max: 92  Resp  Av.6  Min: 16  Max: 20  SpO2  Av %  Min: 92 %  Max: 96 %  I/O last 3 completed shifts:  In: 6551.7 [P.O.:1370; I.V.:5090.5; IV Piggyback:91.2]  Out: 6000 [Urine:6000]    Physical Examination:  Vitals reviewed.   Constitutional:       Appearance: Normal appearance.   HENT:      Head: Normocephalic and atraumatic.   Cardiovascular:      Rate and Rhythm: Normal rate and regular rhythm.      Heart sounds: Normal heart sounds.   Pulmonary:      Effort: Pulmonary effort is normal.      Breath sounds: Normal breath sounds.   Abdominal:      General: Bowel sounds are normal.      Palpations: Abdomen is soft.   Musculoskeletal:         General: No swelling.   Skin:     General: Skin is warm and dry.   Neurological:      General: No focal deficit present.      Mental Status: He is alert and oriented to person, place, and time.      Motor: Weakness present.   Psychiatric:         Mood and Affect: Mood normal.         Behavior: Behavior normal.         Thought Content: Thought content normal.     Laboratory:  Most Recent Data:  CBC:   Lab Results   Component Value Date    WBC 12.62 2024    HGB 9.4 (L) 2024     2024    MCV 83 2024    RDW 14.3 2024     BMP:   Lab Results   Component Value Date     2024    K 4.0 2024     2024    CO2 25 2024    GLU 93 2024    BUN 62 (H) 2024    CREATININE 2.8 (H) 2024    CALCIUM 9.0 2024    MG 1.7 2024    PHOS 3.3 2024     LFTs:   Lab Results   Component  Value Date    PROT 6.6 06/25/2024    ALBUMIN 2.2 (L) 06/25/2024     (H) 06/25/2024    ALKPHOS 139 (H) 06/25/2024     (H) 06/25/2024     Anemia:   Lab Results   Component Value Date    IRON 12 (L) 06/20/2024    TIBC 195 (L) 06/20/2024    FERRITIN 468 (H) 06/20/2024    VZIABUEL40 405 06/20/2024    FOLATE 15.0 06/20/2024     Urinalysis:   Lab Results   Component Value Date    COLORU Yellow 06/23/2024    SPECGRAV 1.015 06/23/2024    NITRITE Negative 06/23/2024    KETONESU Negative 06/23/2024    UROBILINOGEN Negative 06/23/2024    WBCUA 5 06/23/2024       Trended Lab Data:  Recent Labs   Lab 06/23/24  0412 06/23/24  1130 06/23/24  1723 06/24/24  0407 06/25/24  0353   WBC 11.66  --   --  9.92 12.62   *  --   --  222 262   MCV 84  --   --  82 83   RDW 14.6*  --   --  14.4 14.3   *  135*   < > 135* 137  137 140   K 3.7  3.7   < > 4.0 3.8  3.8 4.0     106   < > 103 106  106 107   CO2 18*  18*   < > 19* 19*  19* 25   BUN 74*  74*   < > 77* 75*  75* 62*   CREATININE 4.6*  4.6*   < > 4.1* 3.6*  3.6* 2.8*     100   < > 119* 93  93 93   CALCIUM 8.6*  8.6*   < > 8.6* 8.8  8.8 9.0   PROT 6.4  --   --  6.4 6.6   ALBUMIN 2.1*  --   --  2.0* 2.2*   *  --   --  197* 153*   ALKPHOS 80  --   --  99 139*   *  --   --  125* 125*    < > = values in this interval not displayed.       Other Relevant Results:  Radiology:    Imaging has been reviewed personally.       Assessment and Plan:   Deep Farah is a 73 y.o. male with PMH of HFrEF, pAfib, CAD, Galt palsy presents with a traumatic Rhabdo with CK > 20 k initially, Nephrology consulted for Corey management           # Non-Oliguric COREY on CKD 3a, 2/2 Rhabdomyolysis  - Pt w/ a baseline Cr ~ 1.3,Checked in July 2023, baseline GFR ~ 55  - BUN/Cr ~ 36/2.8 on admission suggesting Intrinsic renal  damage . Cr peaked to 4.7  - UA showing Pr and Bld, 7 RBCs, we will repeat UA to see if Blood and Protein persists after initial  Fluid resuscitation  - FeNA ~ , FeUrea ~   - Renal US w/o evidence for obstruction or hydronephrosis  - Pt's DICK is mostly 2/2 ATN due to Rhabdomyolysis  -Currently in Post ATN polyuric phase  -Pls replace Electrolytes as PRN  - There is no acute indication for RRT at this time  - Recommend obtaining    - Daily Renal Function Panel  - Avoid Hypotension.  - Renally dose all meds  - Please avoid nephrotoxins, including NSAIDs, aminoglycosides, IV contrast (unless absolutely necessary), gadolinium, fleets and other phosphorous-based laxatives. Caution with antibiotic  - Pt is currently Euvolemic  -Plz Continue IVF (LR) @200 ml/hr unless Pt develop SOB/Volume overload  -Urine out put 5 L in last 24 hr, (Pos 5.6 L since admission)  -Encouraged oral intake        #Hypertension  Pls continue Nifedipine 30 mg XR  -Carvedilol 6.25 BID   -Agree with holding given DICK, was on Valsartan 320 mg daily as outpatient;   -Agree with hold Bumatanide 1 mg EOD( Pt was taking at home for (CHF)           Other problem, managed  as per Prim  Sepsis  RUQ Pain, resolved  CAP(rpt CXR with LLL opacification c/w PNA; narrowed to rocephin and azithromycin )  Transaminitis, downtrending   Paroxysmal Atrial fibrillati (Class 1, Status post AICD)  Heart Failure with Reduced Ejection Fraction, NYHA Class 1( TTE without AS, 40-45% EF w/ Repeat )  Status post AICD  Coronary Artery Disease of native vessel of native heart  Hyperlipidemia  Hypothyroidism, acquired   Benign Prostatic Hypertrophy   Mood Disorder        Thank you for allowing us to participate in the care of this patient. Please contact me if you have any questions regarding this consult.    Aretha Mitchell MD  [facility], -IV

## 2024-06-25 NOTE — PROGRESS NOTES
[facility] Hospital Medicine Progress Note    Primary Team: [facility] Hospitalist Team A  Attending Physician: Kirsten Escalante MD  Resident: Raheem  Intern: Cruz    Subjective:      Still feels well this morning. Was able to get up and sit in chair at bedside for most of the day yesterday. No SOB, CP. No LE edema. No complaints aside from being in hospital and desiring to return home.     Objective:     Last 24 Hour Vital Signs:  BP  Min: 137/66  Max: 167/77  Temp  Av.9 °F (36.6 °C)  Min: 97.4 °F (36.3 °C)  Max: 98.2 °F (36.8 °C)  Pulse  Av.1  Min: 72  Max: 92  Resp  Av.6  Min: 16  Max: 20  SpO2  Av.3 %  Min: 92 %  Max: 96 %  I/O last 3 completed shifts:  In: 7863.9 [P.O.:950; I.V.:6760.8; IV Piggyback:153.1]  Out: 3650 [Urine:3650]    Physical Examination:  Gen: WDWN male in NAD  HENT: left facial droop noted as chronic. MMM. EOMI, No goiter  CV: RRR, S1/S2 present, no m/r/g.    2+ radial pulses  Pulm: LLL rales, no wheezing, no respiratory distress  Abd: soft, non-tender, nondistended  Extremities: no edema or deformity. No tenderness to palpation to the BLE. Compartments soft throughout the BU/LE.   Neuro: A&O x3, able to lift self in bed. Facial asymmetry with L facial droop noted as chronic.     Laboratory:  Laboratory Data Reviewed: yes  Pertinent Findings:      Microbiology Data Reviewed: yes  Pertinent Findings:      Other Results:  EKG (my interpretation): .    Radiology Data Reviewed: yes  Pertinent Findings:      Current Medications:     Infusions:   lactated ringers   Intravenous Continuous 200 mL/hr at 24 0559 New Bag at 24 0559        Scheduled:   apixaban  5 mg Oral BID    carvediloL  6.25 mg Oral BID    cefTRIAXone (Rocephin) IV (PEDS and ADULTS)  2 g Intravenous Q24H    levothyroxine  150 mcg Oral Before breakfast    NIFEdipine  30 mg Oral Daily    paroxetine  10 mg Oral QAM    tamsulosin  0.4 mg Oral QHS        PRN:    Current Facility-Administered Medications:      acetaminophen, 650 mg, Oral, Q6H PRN    dextrose 10%, 12.5 g, Intravenous, PRN    dextrose 10%, 25 g, Intravenous, PRN    glucagon (human recombinant), 1 mg, Intramuscular, PRN    glucose, 16 g, Oral, PRN    glucose, 24 g, Oral, PRN    LIDOcaine, 1 patch, Transdermal, Daily PRN    naloxone, 0.02 mg, Intravenous, PRN    oxyCODONE, 5 mg, Oral, Q6H PRN    polyethylene glycol, 17 g, Oral, Daily PRN    senna-docusate 8.6-50 mg, 1 tablet, Oral, Nightly PRN    sodium chloride 0.9%, 10 mL, Intravenous, Q12H PRN    tiZANidine, 4 mg, Oral, Q8H PRN    Antibiotics and Day Number of Therapy:  Vanc 6/20-20   Cefepime 6/20-20  Flagyl 6/20-20  Rocephin 6/21-6/25 (5 doses for CAP)   Azithro 6/21-23 (3 day regimen) CAP    Lines and Day Number of Therapy:  PIV    Assessment:   Deep Farah is a 73 y.o. male with PMH of HFrEF, pAfib, CAD, Masterson palsy presents after being found lying in his yard today after syncopizing in the heat or falling from a chair in the yard and being unable to get off of the ground. History unfortunately limited as unwitnessed however patient A&O upon arrival to the ED. Tachycardic and given 1L NS. Discovered to have rhabdomyolysis with CK of >20,000 with renal injury with creatinine of 2.8 and last available 1.35 in system 1 year ago. No evidence of compartment syndrome on examination. Admitted to [facility] for treatment of rhabdomyolysis with acute kidney injury for further treatment with subsequent fever and treatment for LLL pneumonia.     Plan:     Non-Traumatic Rhabdomyolysis with Stage 2 Acute Kidney Injury, improving  CKD3A  Fall, unwitnessed  - suspect related to prolonged immobility on ground and possible heat injury; low suspicion for compartment syndrome at present.   - initial creatinine 2.8 on prior of 1.35 in 2023[place] [address]; got 1 L NS in ED  - Patient with strict instructions to notify RN if SOB  - TTE without AS, 40-45% EF w/ Repeat  - Given recent falls that are balance/mechanical  suspect similar etiology although unclear history, PNA as below may be contributing  - PT/OT for DME and ambulatory safety eval, recommended rolling walker with HH   - Cr peak at 4.7 now downtrending, CK <5000, Bicarb normalized  - Continue IVF at 200 cc//hr      Sepsis, resolved  RUQ Pain, resolved  CAP, improved   - febrile overnight 6/20 resolving without intervention; resolved leukocytosis  - blood cx obtained and resp panel PCR; both negative  - getting IVF  - CXR with LLL opacification c/w PNA; narrowed to rocephin and azithromycin   - No evidence of choleycystitis on RUQ US, vanc and flagyl stopped  - Continued CAP coverage x5 total days of rocephin, completed azithro; will complete course today and not need PO abx on discharge    Transaminitis, downtrending  - believed 2/2 CK, negative Hep panel and RUQ US given fever  - monitor on rpt CMP  - transaminases and t bili downtrending; non-cholestatic pattern  - hold crestor x1 week on discharge    Normocytic Anemia of Chronic Inflammation  - HGB 11.9.   - appears hemoconcentrated vs baseline  - iron panel, b12/folate consistent with AoCI  - decreased with IVF and stage 3 DICK; stable without GI losses or bruising or bleeding      Hypertension  - hypertensive to 180s; did not take meds morning of admission  - on Valsartan 320 mg daily as outpatient; holding given DCIK   - nifedipine 30 mg XR now and daily given DICK; continue Coreg  - restart norvasc on discharge and hold valsartan x1 week after discharge     Paroxysmal Atrial fibrillation  - continue eliquis  - coreg 6.25 for rate control/HTN      Heart Failure with Reduced Ejection Fraction, NYHA Class 1  Status post AICD  - EF 25-30% on TTE 5/2023; asymptomatic at baseline  - NYHA class 1 per report  - holding Valsartan and aldactone given DICK; continue Coreg currently  - Repeat TTE with improved EF 40-45%, can continue IVF as no evidence of fluid overload     Coronary Artery Disease of native vessel of native  heart  Hyperlipidemia  - holding crestor 5 mg given transaminitis     Hypothyroidism, acquired  - continue levothyroxine 150 mcg QD  - check TSH/FT4     Benign Prostatic Hypertrophy  - continue flomax 0.4 mg nightly     Mood Disorder  - continue paroxetine      Diet: cardiac  VTE Ppx: eliquis  Code: Full  Dispo: continue med/tele care, creatinine improving; anticipate discharge in next 24-36 hours    Josep Maciel DO  [facility]/Emergency Medicine [facility] Medicine Hospitalist Pager numbers:   [facility] Hospitalist Medicine Team A (Marli/Boris): [phone]  [facility] Hospitalist Medicine Team B (Danie/Chani):  [phone]

## 2024-06-25 NOTE — PROGRESS NOTES
Virtual Nurse:Discharge orders noted; additional clinical references attached.  notified.  Patient's discharge instruction packet given by bedside RN.    Cued into patient's room.  Permission received per patient to turn camera to view patient.  Introduced as VN that will be instructing on discharge instructions.  Educated patient on reason for admission; medications to hold, continue, and start, appointment to follow-up with doctor, and when to return to ED. Teach back method used. Verbalized understanding  Number given for 24/7 Nurse Line. Opportunity given for questions and questions answered.  Bedside nurse updated.     1515 Patient's son, Haim, at bedside. Reviewed discharge instructions with Haim and answered questions.       06/25/24 1305   Shift   Virtual Nurse - Patient Verbalized Approval Of Camera Use;VN Rounding   Type of Frequent Check   Type Patient Rounds   Safety/Activity   Patient Rounds visualized patient   Activity Assistance Provided independent

## 2024-06-25 NOTE — DISCHARGE INSTRUCTIONS
HOLD AND DO NOT TAKE THE FOLLOWING MEDICINES FOR 1 WEEK: Valsartan, Spironolactone (Aldactone), Rosuvastatin (Crestor), Bumetanide (Bumex). YOU MAY RESUME TAKING THESE ON JULY 1 OR AS DIRECTED BY A PHYSICIAN BASED UPON REPEAT BLOODWORK IN 1 WEEK.

## 2024-06-25 NOTE — CARE UPDATE
[facility] - Telemetry      HOME HEALTH ORDERS  FACE TO FACE ENCOUNTER    Patient Name: Deep Farah  YOB: 1950    PCP: Enrique Tony MD   PCP Address: [address] / [place]  PCP Phone Number: [phone]  PCP Fax: [phone]    Encounter Date: 6/20/24    Admit to Home Health    Diagnoses:  Active Hospital Problems    Diagnosis  POA    *Non-traumatic rhabdomyolysis [M62.82]  Yes    Diarrhea [R19.7]  Yes    Pneumonia of left lower lobe due to infectious organism [J18.9]  Yes    Metabolic acidosis [E87.20]  Yes    CKD stage 3a, GFR 45-59 ml/min [N18.31]  Yes    Normocytic anemia [D64.9]  Yes    Hypothyroidism [E03.9]  Yes    DICK (acute kidney injury) [N17.9]  Yes    Hypertension [I10]  Yes    Chronic gout [M1A.9XX0]  Yes    Coronary artery disease involving native coronary artery of native heart without angina pectoris [I25.10]  Yes    Hyperlipidemia [E78.5]  Yes    Left-sided Bell's palsy [G51.0]  Yes    Transaminitis [R74.01]  Yes    Hyperglycemia [R73.9]  Yes    Chronic anticoagulation [Z79.01]  Not Applicable     Chronic    History of cardiac radiofrequency ablation [Z98.890]  Not Applicable     Chronic     Formatting of this note might be different from the original.   Afib PVI      Chronic systolic congestive heart failure, NYHA class 2 [I50.22]  Yes    Paroxysmal atrial fibrillation [I48.0]  Yes     Chronic      Resolved Hospital Problems    Diagnosis Date Resolved POA    Fever [R50.9] 06/24/2024 Yes    Hyperbilirubinemia [E80.6] 06/24/2024 Yes       Follow Up Appointments:  Future Appointments   Date Time Provider Department Center   7/3/2024  9:00 AM Kelly Choi MD [facility]       Allergies:  Review of patient's allergies indicates:   Allergen Reactions    Pcn [penicillins] Swelling       Medications: Review discharge medications with patient and family and provide education.    Current Facility-Administered Medications   Medication Dose Route  Frequency Provider Last Rate Last Admin    acetaminophen tablet 650 mg  650 mg Oral Q6H PRN Theron Abbott MD   650 mg at 06/21/24 1232    apixaban tablet 5 mg  5 mg Oral BID Josep Maciel, DO   5 mg at 06/24/24 0917    carvediloL tablet 6.25 mg  6.25 mg Oral BID Josep Maciel, DO   6.25 mg at 06/24/24 0917    cefTRIAXone (ROCEPHIN) 2 g in D5W 100 mL IVPB (MB+)  2 g Intravenous Q24H Josep Maciel, DO   Stopped at 06/24/24 0948    dextrose 10% bolus 125 mL 125 mL  12.5 g Intravenous PRN Josep Maciel DO        dextrose 10% bolus 250 mL 250 mL  25 g Intravenous PRN Josep Maciel DO        glucagon (human recombinant) injection 1 mg  1 mg Intramuscular PRN Josep Maciel,         glucose chewable tablet 16 g  16 g Oral PRN Josep Maciel DO        glucose chewable tablet 24 g  24 g Oral PRN Josep Maciel DO        lactated ringers infusion   Intravenous Continuous Josep Maciel  mL/hr at 06/24/24 1750 Rate Verify at 06/24/24 1750    levothyroxine tablet 150 mcg  150 mcg Oral Before breakfast Josep Maciel, DO   150 mcg at 06/24/24 0609    LIDOcaine 5 % patch 1 patch  1 patch Transdermal Daily PRN Josep Maciel DO        naloxone 0.4 mg/mL injection 0.02 mg  0.02 mg Intravenous PRN Josep Maciel DO        NIFEdipine 24 hr tablet 30 mg  30 mg Oral Daily Josep Maciel, DO   30 mg at 06/24/24 0917    oxyCODONE immediate release tablet 5 mg  5 mg Oral Q6H PRN Josep Maciel DO        paroxetine tablet 10 mg  10 mg Oral QAM Josep Maciel, DO   10 mg at 06/24/24 0609    polyethylene glycol packet 17 g  17 g Oral Daily PRN Josep Maciel DO        senna-docusate 8.6-50 mg per tablet 1 tablet  1 tablet Oral Nightly PRN Josep Maciel DO        sodium chloride 0.9% flush 10 mL  10 mL Intravenous Q12H PRN Josep Maciel,  DO        tamsulosin 24 hr capsule 0.4 mg  0.4 mg Oral QHS Raheem Josep Dandre,    0.4 mg at 06/23/24 2012    tiZANidine tablet 4 mg  4 mg Oral Q8H PRN Josep Maciel,          Current Discharge Medication List        CONTINUE these medications which have CHANGED    Details   rosuvastatin (CRESTOR) 5 MG tablet Take 1 tablet (5 mg total) by mouth once daily.  Qty: 30 tablet, Refills: 2      tamsulosin (FLOMAX) 0.4 mg Cap Take 1 capsule (0.4 mg total) by mouth every evening.  Qty: 30 capsule, Refills: 2      valsartan (DIOVAN) 320 MG tablet Take 1 tablet (320 mg total) by mouth once daily.  Qty: 30 tablet, Refills: 2    Comments: .           CONTINUE these medications which have NOT CHANGED    Details   allopurinoL (ZYLOPRIM) 100 MG tablet Take 100 mg by mouth once daily.      amLODIPine (NORVASC) 5 MG tablet       carvediloL (COREG) 6.25 MG tablet Take 6.25 mg by mouth 2 (two) times daily.      ELIQUIS 5 mg Tab Take 5 mg by mouth 2 (two) times daily.      ferrous sulfate (HIGH POTENCY IRON) 27 mg iron Tab Take 1 tablet by mouth once daily.      levothyroxine (SYNTHROID) 150 MCG tablet Take 150 mcg by mouth before breakfast.      paroxetine (PAXIL) 10 MG tablet Take 10 mg by mouth every morning.      potassium chloride (KLOR-CON) 10 MEQ TbSR Take 10 mEq by mouth once daily.      spironolactone (ALDACTONE) 25 MG tablet Take 25 mg by mouth once daily.           STOP taking these medications       oxybutynin (DITROPAN-XL) 10 MG 24 hr tablet Comments:   Reason for Stopping:         HYDROcodone-acetaminophen (NORCO) 7.5-325 mg per tablet Comments:   Reason for Stopping:                 I have seen and examined this patient within the last 30 days. My clinical findings that support the need for the home health skilled services and home bound status are the following:no   Weakness/numbness causing balance and gait disturbance due to Heart Failure making it taxing to leave home.     Diet:   cardiac  diet    Labs:  CMP, Creatine kinase 1 week after discharge (approximately 7/1/2024); report to PCP    Referrals/ Consults  Physical Therapy to evaluate and treat. Evaluate for home safety and equipment needs; Establish/upgrade home exercise program. Perform / instruct on therapeutic exercises, gait training, transfer training, and Range of Motion.  Occupational Therapy to evaluate and treat. Evaluate home environment for safety and equipment needs. Perform/Instruct on transfers, ADL training, ROM, and therapeutic exercises.    Activities:   activity as tolerated    Nursing:   Agency to admit patient within 24 hours of hospital discharge unless specified on physician order or at patient request    SN to complete comprehensive assessment including routine vital signs. Instruct on disease process and s/s of complications to report to MD. Review/verify medication list sent home with the patient at time of discharge  and instruct patient/caregiver as needed. Frequency may be adjusted depending on start of care date.     Skilled nurse to perform up to 3 visits PRN for symptoms related to diagnosis    Notify MD if SBP > 160 or < 90; DBP > 90 or < 50; HR > 120 or < 50; Temp > 101; O2 < 88%; Other:       Ok to schedule additional visits based on staff availability and patient request on consecutive days within the home health episode.    When multiple disciplines ordered:    Start of Care occurs on Sunday - Wednesday schedule remaining discipline evaluations as ordered on separate consecutive days following the start of care.    Thursday SOC -schedule subsequent evaluations Friday and Monday the following week.     Friday - Saturday SOC - schedule subsequent discipline evaluations on consecutive days starting Monday of the following week.    For all post-discharge communication and subsequent orders please contact patient's primary care physician. If unable to reach primary care physician or do not receive response within 30  minutes, please contact on call PCP MD for clinical staff order clarification    Miscellaneous       Home Health Aide:  Physical Therapy Three times weekly and Occupational Therapy Three times weekly    Wound Care Orders  no    I certify that this patient is confined to his home and needs intermittent skilled nursing care, physical therapy, and occupational therapy.      Josep Maciel, DO  [facility] Internal Medicine/Emergency Medicine [facility]

## 2024-06-25 NOTE — PLAN OF CARE
IVANA met with pt at bedside this AM to complete final d/c assessment. Pt stated that he will have his son Haim help transport him home around noon today. Pt was accepted by [facility] Phone: [phone] sw called [place] at [place] to inform her that the pt will d/c home later today. Pt will be seen starting tomorrow. Rounds completed on pt. All questions addressed. Bedside nurse to discuss d/c medications. Discussed importance to attend all f/u appts and take medications as prescribed. Verbalized understanding. Case Management to sign off.     CIRO Khan  [phone]    Future Appointments   Date Time Provider Department Center   7/3/2024  9:00 AM Kelly Choi MD [facility]        06/25/24 1012   Final Note   Assessment Type Final Discharge Note   Anticipated Discharge Disposition Home-Health   What phone number can be called within the next 1-3 days to see how you are doing after discharge? [phone]   Post-Acute Status   Post-Acute Authorization Home Health   Home Health Status Set-up Complete/Auth obtained   Coverage PHN   Discharge Delays None known at this time

## 2024-06-25 NOTE — NURSING
Discharge instruction and education packet provided. VN notified. IV site removed cath tip intact. Telemetry discontinued without adverse reaction. Patient shows no acute distress. Waiting for Son to arrived to transport pt home.

## 2024-06-25 NOTE — DISCHARGE SUMMARY
[facility] Hospital Medicine Discharge Summary    Primary Team: [facility] Hospitalist Team A  Attending Physician: Kirsten Escalante MD  Resident: Raheem  Intern: Cruz    Date of Admit: 6/20/2024  Date of Discharge: 6/25/2024    Discharge to: Home Health  Condition: Good    Discharge Diagnoses     Patient Active Problem List   Diagnosis    Chronic anticoagulation    Chronic systolic congestive heart failure, NYHA class 2    History of cardiac radiofrequency ablation    Paroxysmal atrial fibrillation    Hypothyroidism    DICK (acute kidney injury)    Non-traumatic rhabdomyolysis    Hypertension    Chronic gout    Coronary artery disease involving native coronary artery of native heart without angina pectoris    Hyperlipidemia    Left-sided Bell's palsy    Transaminitis    Hyperglycemia    CKD stage 3a, GFR 45-59 ml/min    Normocytic anemia    Pneumonia of left lower lobe due to infectious organism    Metabolic acidosis    Diarrhea       Consultants and Procedures     Consultants:  [facility] Nephrology     Procedures:   N/a    Imaging:  US Retroperitoneal Complete   Final Result      Increased renal cortical echogenicity bilaterally suggests some degree of chronic medical renal disease.  Asymmetric elevation of the left renal resistive index as compared to the right may reflect acute on chronic process.  No hydronephrosis.         Electronically signed by: Remy Quinn MD   Date:    06/22/2024   Time:    15:03      US Abdomen Limited   Final Result      Abnormal appearance of the liver suggesting nonspecific hepatocellular edema.         Electronically signed by: Rhett Dowd Jr   Date:    06/21/2024   Time:    12:54      X-Ray Chest PA And Lateral   Final Result      As above.         Electronically signed by: Jose De Jesus Sousa   Date:    06/21/2024   Time:    09:19      X-Ray Chest AP Portable   Final Result      Mild hazy opacities in the lung bases.  Correlate for atelectasis or mild is interstitial process such as  pneumonitis or pulmonary edema.         Electronically signed by: Rhett Newell   Date:    06/21/2024   Time:    02:09      CT Head Without Contrast   Final Result      Negative CT of the brain for acute hemorrhage, mass or infarction.      Involutional and chronic small vessel changes.      Multifocal cervical spondylosis and stenosis appearing chronic.      No evidence of acute cervical fracture, subluxation or hematoma.         Electronically signed by: Rhett Newell   Date:    06/20/2024   Time:    18:59      CT Cervical Spine Without Contrast   Final Result      Negative CT of the brain for acute hemorrhage, mass or infarction.      Involutional and chronic small vessel changes.      Multifocal cervical spondylosis and stenosis appearing chronic.      No evidence of acute cervical fracture, subluxation or hematoma.         Electronically signed by: Rhett Newell   Date:    06/20/2024   Time:    18:59            Brief History of Present Illness      Deep Farah is a 73 y.o.  male who has PMH of pAfib s/p radiofrequency ablation, HFrEF s/p AICD (EF 25-30% with G2DD 5/2023), CAD, HTN, HLD, BPH, Gout who presented after a fall and reported possible loss of consciousness on 6/20/2024 after he was out working in the heat in his yard/garden. He denied complaints on arrival aside from loss of memory of several hours during the day; he was found lying on ground by a neighbor. Prior to day of presentation he endorsed some general weakness and a fall on the day prior after dropping his cane. NO fever/chills/SOB/CP or weight gain.    For the full HPI please refer to the History & Physical from this admission.    Hospital Course By Problem with Pertinent Findings     74 yo male presented after fall/LOC episode of unclear circumstance as unwitnessed. Labwork in ED with hemoconcentration and leukocytosis with CK elevation of 20k and DICK with Cr of 2.8 on prior value of 1.3 over a year prior consistent with  rhabdomyolysis. Compartments soft, no occult injury detected on exam. No severe electrolyte derangements. Admitted and placed on IV fluids with subsequent fever and repeated standing CXR revealing LLL PNA for which he underwent 5 day course of rocephin and 3 day course of azithromycin with resolution of leukocytosis and no recurrent fever. This may have contributed to presenting condition. Creatinine worsened despite downtrending CK plateauing at 4.7 with subsequent downtrend and good UOP throughout course. Valsartan, crestor, aldactone, bumex held during admission. No evidence of volume overload despite robust IV fluid administration. A repeat TTE revealed improved EF to 40-45%.     Non-Traumatic Rhabdomyolysis with Stage 2 Acute Kidney Injury, improving  CKD3A  Fall, unwitnessed  - suspect related to prolonged immobility on ground and possible heat injury; low suspicion for compartment syndrome at present.   - initial creatinine 2.8 on prior of 1.35 in 2023; [place] [address]; got 1 L NS in ED  - Patient with strict instructions to notify RN if SOB  - TTE without AS, 40-45% EF on repeat  - Given recent falls that are balance/mechanical suspect similar etiology although unclear history, PNA as below may be contributing  - PT/OT for DME and ambulatory safety eval, recommended rolling walker with HH   - Cr peak at 4.7 now downtrending to 2.8 on discharge, CK 1500 at discharge, Bicarb normalized      Community Acquired Pneumonia of Left Lower Lobe  Sepsis, resolved  RUQ Pain, resolved  - febrile overnight 6/20 resolving without intervention; resolved leukocytosis  - blood cx obtained and resp panel PCR; both negative  - getting IVF  - CXR with LLL opacification c/w PNA; narrowed to rocephin and azithromycin   - No evidence of choleycystitis on RUQ US, vanc and flagyl stopped  - Continued CAP coverage x5 total days of rocephin, completed azithro; will complete course today and not need PO abx on discharge      Transaminitis, improving  - believed 2/2 CK, negative Hep panel and negative RUQ US given fever  - monitor on rpt CMP  - transaminases and t bili downtrending; non-cholestatic pattern  - hold crestor x1 week on discharge     Normocytic Anemia of Chronic Inflammation  - HGB 11.9.   - appears hemoconcentrated vs baseline  - iron panel, b12/folate consistent with AoCI  - decreased with IVF and stage 3 DICK; stable without GI losses or bruising or bleeding      Hypertension  - hypertensive to 180s; did not take meds morning of admission  - on Valsartan 320 mg daily as outpatient; holding given DICK   - nifedipine 30 mg XR while inpatient given DICK; continued Coreg  - restart norvasc on discharge and hold valsartan x1 week after discharge     Paroxysmal Atrial fibrillation  - continue eliquis  - coreg 6.25 for rate control/HTN      Heart Failure with Reduced Ejection Fraction, NYHA Class 1  Status post AICD  - EF 25-30% on TTE 5/2023; asymptomatic at baseline  - NYHA class 1 per report  - holding Valsartan and aldactone given DICK; continue Coreg currently  - Repeat TTE with improved EF 40-45%, can continue IVF as no evidence of fluid overload  - holding aldactone x1 week on discharge. Was taking bumex intermittently at home; instructed to use on PRN basis for weight gain/volume overload     Coronary Artery Disease of native vessel of native heart  Hyperlipidemia  - holding crestor 5 mg given transaminitis; resume in 1 week      Hypothyroidism, acquired  - continue levothyroxine 150 mcg QD  - TSH/FT4 wnl     Benign Prostatic Hypertrophy  - continued flomax 0.4 mg nightly  - stopped mirabegron as suspect OAB symptoms are due to diuretics and in setting of concern for iatrogenic component of falls recently      Mood Disorder  - continued paroxetine       Discharge Medications        Medication List        START taking these medications      bumetanide 1 MG tablet  Commonly known as: BUMEX  Take 1 tablet (1 mg total) by  mouth daily as needed (For weight gain >2 lbs in 24 hours or 5 lbs over 3 days.).            CHANGE how you take these medications      rosuvastatin 5 MG tablet  Commonly known as: CRESTOR  Take 1 tablet (5 mg total) by mouth once daily.  Start taking on: July 1, 2024  What changed: These instructions start on July 1, 2024. If you are unsure what to do until then, ask your doctor or other care provider.     spironolactone 25 MG tablet  Commonly known as: ALDACTONE  Take 1 tablet (25 mg total) by mouth once daily.  Start taking on: July 1, 2024  What changed: These instructions start on July 1, 2024. If you are unsure what to do until then, ask your doctor or other care provider.     tamsulosin 0.4 mg Cap  Commonly known as: FLOMAX  Take 1 capsule (0.4 mg total) by mouth every evening.  What changed: when to take this     valsartan 320 MG tablet  Commonly known as: DIOVAN  Take 1 tablet (320 mg total) by mouth once daily.  Start taking on: July 1, 2024  What changed: These instructions start on July 1, 2024. If you are unsure what to do until then, ask your doctor or other care provider.            CONTINUE taking these medications      allopurinoL 100 MG tablet  Commonly known as: ZYLOPRIM     amLODIPine 5 MG tablet  Commonly known as: NORVASC     carvediloL 6.25 MG tablet  Commonly known as: COREG     ELIQUIS 5 mg Tab  Generic drug: apixaban     HIGH POTENCY IRON 27 mg iron Tab  Generic drug: ferrous sulfate     levothyroxine 150 MCG tablet  Commonly known as: SYNTHROID     paroxetine 10 MG tablet  Commonly known as: PAXIL     potassium chloride 10 MEQ Tbsr  Commonly known as: KLOR-CON            STOP taking these medications      HYDROcodone-acetaminophen 7.5-325 mg per tablet  Commonly known as: NORCO     oxybutynin 10 MG 24 hr tablet  Commonly known as: DITROPAN-XL               Where to Get Your Medications        These medications were sent to [facility]/pharmacy #[id number] - [place], [place] - [address]  [address] [address]      Phone: [phone]   bumetanide 1 MG tablet  rosuvastatin 5 MG tablet  spironolactone 25 MG tablet  tamsulosin 0.4 mg Cap  valsartan 320 MG tablet         Discharge Information:   Diet:  Cardiac    Physical Activity:  As tolerated;  PT/OT             Instructions:  1. Take all medications as prescribed  2. Keep all follow-up appointments  3. Return to the hospital or call your primary care physicians if any worsening symptoms such as fever, chest pain, shortness of breath, return of symptoms, or any other concerns.    Follow-Up Appointments:  Future Appointments   Date Time Provider Department Center   7/3/2024  9:00 AM Kelly Choi MD [facility] Maciel DO  [facility] Internal Medicine/Emergency Medicine [facility]

## 2024-06-26 LAB
ALDOLASE SERPL-CCNC: 41.8 U/L (ref 1.2–7.6)
BACTERIA BLD CULT: NORMAL
BACTERIA BLD CULT: NORMAL

## 2024-07-01 ENCOUNTER — LAB VISIT (OUTPATIENT)
Dept: LAB | Facility: HOSPITAL | Age: 74
End: 2024-07-01
Attending: NURSE PRACTITIONER
Payer: MEDICARE

## 2024-07-01 DIAGNOSIS — N17.9 ACUTE KIDNEY FAILURE, UNSPECIFIED: Primary | ICD-10-CM

## 2024-07-01 LAB
ALBUMIN SERPL BCP-MCNC: 3.4 G/DL (ref 3.5–5.2)
ALP SERPL-CCNC: 107 U/L (ref 38–126)
ALT SERPL W/O P-5'-P-CCNC: 58 U/L (ref 10–44)
ANION GAP SERPL CALC-SCNC: 11 MMOL/L (ref 8–16)
AST SERPL-CCNC: 43 U/L (ref 15–46)
BILIRUB SERPL-MCNC: 0.6 MG/DL (ref 0.1–1)
CALCIUM SERPL-MCNC: 8.7 MG/DL (ref 8.7–10.5)
CHLORIDE SERPL-SCNC: 103 MMOL/L (ref 95–110)
CK SERPL-CCNC: 159 U/L (ref 55–170)
CO2 SERPL-SCNC: 27 MMOL/L (ref 23–29)
CREAT SERPL-MCNC: 1.71 MG/DL (ref 0.5–1.4)
EST. GFR  (NO RACE VARIABLE): 41.2 ML/MIN/1.73 M^2
GLUCOSE SERPL-MCNC: 84 MG/DL (ref 70–110)
POTASSIUM SERPL-SCNC: 4.1 MMOL/L (ref 3.5–5.1)
PROT SERPL-MCNC: 7.2 G/DL (ref 6–8.4)
SODIUM SERPL-SCNC: 141 MMOL/L (ref 136–145)
UUN UR-MCNC: 20 MG/DL (ref 2–20)

## 2024-07-01 PROCEDURE — 82550 ASSAY OF CK (CPK): CPT | Mod: PN | Performed by: NURSE PRACTITIONER

## 2024-07-01 PROCEDURE — 80053 COMPREHEN METABOLIC PANEL: CPT | Mod: PN | Performed by: NURSE PRACTITIONER

## 2024-07-01 PROCEDURE — 36415 COLL VENOUS BLD VENIPUNCTURE: CPT | Mod: PN | Performed by: NURSE PRACTITIONER

## 2024-07-10 PROBLEM — E87.20 METABOLIC ACIDOSIS: Status: RESOLVED | Noted: 2024-06-22 | Resolved: 2024-07-10

## 2024-07-10 NOTE — PROGRESS NOTES
[facility]   New Visit Progress Note   Recent Hospital Discharge     PRESENTING HISTORY     Chief Complaint/Reason for Admission:  Follow up Hospital Discharge   PCP: Enrique Tony MD    History of Present Illness:  Mr. Deep Farah is a 73 y.o. male who was recently admitted to the hospital.    [facility] Medicine Discharge Summary  Primary Team: [facility] Hospitalist Team A  Attending Physician: Kirsten Escalante MD  Resident: Raheem  Intern: Cruz  Date of Admit: 6/20/2024  Date of Discharge: 6/25/2024  Discharge to: Home Health  Condition: Good  ___________________________________________________________________    Today:  [facility] for [facility] follow up.  Recently hospitalized for management of sepsis due to left lower lobe pneumonia.   Condition complicated by rhabdomyolysis and DICK.  Patient had mechanical fall at home with unknown down time.   Admitted to [facility] Medicine service with Nephrology consult.  Rocephin and Azithromycin initiated for PNA.  Blood Cx NGTD.  Viral Respiratory Panel NEG.   Renal function improved with IV fluids.  Patient responded well to above interventions and supportive care.  Discharged to home with [facility].    Patient unaccompanied today.  Did not bring medication bottles for review but he is able to verify EPIC medication list.  Ambulatory with 4 prong cane- this is his baseline.   Has home health services in place but preparing for discharge- goals met.   He has resumed his usual activities including light exercise on home stationary bike.  Primary Care at [facility].       Review of Systems  General ROS: negative for chills, fever or weight loss  Psychological ROS: negative for hallucination, depression or suicidal ideation  Ophthalmic ROS: negative for blurry vision, photophobia or eye pain  ENT ROS: negative for epistaxis, sore throat or rhinorrhea  Respiratory ROS: no cough, shortness of breath, or  wheezing  Cardiovascular ROS: no chest pain or dyspnea on exertion  Gastrointestinal ROS: no abdominal pain, change in bowel habits, or black/ bloody stools  Genito-Urinary ROS: no dysuria, trouble voiding, or hematuria  Musculoskeletal ROS: negative for gait disturbance or muscular weakness  + chronic right knee pain   Neurological ROS: no syncope or seizures; no ataxia  Dermatological ROS: negative for pruritis, rash and jaundice      PAST HISTORY:     Past Medical History:   Diagnosis Date    Bell's palsy     Diverticulitis     Hypertension        Past Surgical History:   Procedure Laterality Date    CARDIAC DEFIBRILLATOR PLACEMENT      PROSTATE SURGERY         Family History   Problem Relation Name Age of Onset    Stroke Father      Hypertension Father      Hypertension Brother      Prostate cancer Brother      Prostate cancer Cousin           MEDICATIONS & ALLERGIES:     Current Outpatient Medications on File Prior to Visit   Medication Sig Dispense Refill    allopurinoL (ZYLOPRIM) 100 MG tablet Take 100 mg by mouth once daily.      amLODIPine (NORVASC) 5 MG tablet       bumetanide (BUMEX) 1 MG tablet Take 1 tablet (1 mg total) by mouth daily as needed (For weight gain >2 lbs in 24 hours or 5 lbs over 3 days.). 30 tablet 11    carvediloL (COREG) 6.25 MG tablet Take 6.25 mg by mouth 2 (two) times daily.      ELIQUIS 5 mg Tab Take 5 mg by mouth 2 (two) times daily.      ferrous sulfate (HIGH POTENCY IRON) 27 mg iron Tab Take 1 tablet by mouth once daily.      levothyroxine (SYNTHROID) 150 MCG tablet Take 150 mcg by mouth before breakfast.      paroxetine (PAXIL) 10 MG tablet Take 10 mg by mouth every morning.      potassium chloride (KLOR-CON) 10 MEQ TbSR Take 10 mEq by mouth once daily.      rosuvastatin (CRESTOR) 5 MG tablet Take 1 tablet (5 mg total) by mouth once daily. 30 tablet 2    spironolactone (ALDACTONE) 25 MG tablet Take 1 tablet (25 mg total) by mouth once daily. 30 tablet 2    tamsulosin (FLOMAX)  "0.4 mg Cap Take 1 capsule (0.4 mg total) by mouth every evening. 30 capsule 2    valsartan (DIOVAN) 320 MG tablet Take 1 tablet (320 mg total) by mouth once daily. 30 tablet 2     No current facility-administered medications on file prior to visit.        Review of patient's allergies indicates:   Allergen Reactions    Pcn [penicillins] Swelling       OBJECTIVE:     Vital Signs:  BP (!) 142/78 (BP Location: Left arm, Patient Position: Sitting, BP Method: Large (Automatic))   Pulse 90   Ht 5' 11" (1.803 m)   Wt 106.1 kg (233 lb 14.5 oz)   SpO2 96%   BMI 32.62 kg/m²   Wt Readings from Last 3 Encounters:   07/11/24 1008 106.1 kg (233 lb 14.5 oz)   06/21/24 0959 104.8 kg (231 lb)   06/21/24 0615 104.8 kg (231 lb 0.7 oz)   06/20/24 1610 99.8 kg (220 lb)   03/05/18 1023 118.4 kg (261 lb)     Body mass index is 32.62 kg/m².        Physical Exam:  BP (!) 142/78 (BP Location: Left arm, Patient Position: Sitting, BP Method: Large (Automatic))   Pulse 90   Ht 5' 11" (1.803 m)   Wt 106.1 kg (233 lb 14.5 oz)   SpO2 96%   BMI 32.62 kg/m²   General appearance: alert, cooperative, no distress  Constitutional: Oriented to person, place, and time  + appears well-developed and well-nourished.   HEENT: Normocephalic, atraumatic, neck symmetrical, no nasal discharge   Eyes: conjunctivae/corneas clear, PERRL, EOM's intact  Lungs: clear to auscultation bilaterally, no dullness to percussion bilaterally  Heart: regular rate and rhythm without rub; no displacement of the PMI   Abdomen: soft, non-tender; bowel sounds normoactive; no organomegaly  Extremities:  + trace pretibial edema right leg   Integument: Skin color, texture, turgor normal; no rashes; hair distrubution normal  Neurologic: Alert and oriented X 3, normal strength, normal coordination and gait  Psychiatric: no pressured speech; normal affect; no evidence of impaired cognition     Laboratory  Lab Results   Component Value Date    WBC 12.62 06/25/2024    HGB 9.4 (L) " 06/25/2024    HCT 29.0 (L) 06/25/2024    MCV 83 06/25/2024     06/25/2024     BMP  Lab Results   Component Value Date     07/01/2024    K 4.1 07/01/2024     07/01/2024    CO2 27 07/01/2024    BUN 20 07/01/2024    CREATININE 1.71 (H) 07/01/2024    CALCIUM 8.7 07/01/2024    ANIONGAP 11 07/01/2024    EGFRNORACEVR 41.2 (A) 07/01/2024     Lab Results   Component Value Date    ALT 58 (H) 07/01/2024    AST 43 07/01/2024    ALKPHOS 107 07/01/2024    BILITOT 0.6 07/01/2024     Lab Results   Component Value Date    INR 1.3 (H) 03/24/2023    INR 1.6 (H) 02/15/2023    INR 1.8 (H) 06/28/2022     Lab Results   Component Value Date    HGBA1C 5.0 06/20/2024       Diagnostic Results:    Chest X Ray 6/21/24:  Left chest cardiac device and monitoring leads. Improved size of the cardiac silhouette on today's non-portable exam. Patchy airspace opacity at the left mid and lower lung zone concerning for infectious process such as pneumonia. Subsequent radiographic follow-up recommended to ensure resolution. Right lung is essentially clear. No large pleural effusion or gross pneumothorax. No acute osseous abnormality.     US ABD 6/21/24:  Abnormal appearance of the liver suggesting nonspecific hepatocellular edema.     US Retroperitoneal 6/22/24:  Increased renal cortical echogenicity bilaterally suggests some degree of chronic medical renal disease. Asymmetric elevation of the left renal resistive index as compared to the right may reflect acute on chronic process. No hydronephrosis.     2 D echo 6/20/24:    Left Ventricle: The left ventricle is mildly dilated. Normal wall thickness. There is mildly reduced systolic function with a visually estimated ejection fraction of 40 - 45%. There is diastolic dysfunction but grade cannot be determined.    Right Ventricle: Normal right ventricular cavity size. Systolic function is normal. Catheter present in the ventricle. Pacemaker lead present in the ventricle.    Left Atrium:  Left atrium is moderately dilated. The left atrium volume index is 44.8 mL/m2.    Right Atrium: Right atrium is moderately dilated.    Mitral Valve: There is mild regurgitation.    Pulmonary Artery: The estimated pulmonary artery systolic pressure is 33 mmHg.    IVC/SVC: Normal venous pressure at 3 mmHg.    ASSESSMENT & PLAN:     Sepsis with acute renal failure without septic shock, due to unspecified organism, unspecified acute renal failure type  Pneumonia of left lower lobe due to infectious organism  - recent hospitalization as above  - completed ABX while hospitalized  - respiratory status at baseline     Non-traumatic rhabdomyolysis  - found down in back yard- unknown down time  - CPK normalized prior to hospital discharge    DICK (acute kidney injury)  - in setting of sepsis and rhabdomyolysis   - responded well to IV fluids and supportive care  - repeat labs today   -     Comprehensive metabolic panel; Future; Expected date: 07/11/2024    Transaminitis  - in setting of sepsis and rhabdomyolysis  - repeat labs today    Paroxysmal atrial fibrillation  Chronic anticoagulation  - continue current regimen   - see Cardiology 8/6/24 ([facility])    Patient will be released from hospital follow up clinic.  Records will be shared with PCP for coordination of care.     Instructions for the patient:      Scheduled Follow-up :  Future Appointments   Date Time Provider Department Center   7/11/2024 11:00 AM APPOINTMENT [facility] Lelo Iqbal       Post Visit Medication List:     Medication List            Accurate as of July 11, 2024 10:43 AM. If you have any questions, ask your nurse or doctor.                CONTINUE taking these medications      allopurinoL 100 MG tablet  Commonly known as: ZYLOPRIM     bumetanide 1 MG tablet  Commonly known as: BUMEX  Take 1 tablet (1 mg total) by mouth daily as needed (For weight gain >2 lbs in 24 hours or 5 lbs over 3 days.).     carvediloL 6.25 MG tablet  Commonly known  as: COREG     ELIQUIS 5 mg Tab  Generic drug: apixaban     HIGH POTENCY IRON 27 mg iron Tab  Generic drug: ferrous sulfate     levothyroxine 150 MCG tablet  Commonly known as: SYNTHROID     paroxetine 10 MG tablet  Commonly known as: PAXIL     potassium chloride 10 MEQ Tbsr  Commonly known as: KLOR-CON     rosuvastatin 5 MG tablet  Commonly known as: CRESTOR  Take 1 tablet (5 mg total) by mouth once daily.     spironolactone 25 MG tablet  Commonly known as: ALDACTONE  Take 1 tablet (25 mg total) by mouth once daily.     tamsulosin 0.4 mg Cap  Commonly known as: FLOMAX  Take 1 capsule (0.4 mg total) by mouth every evening.     valsartan 320 MG tablet  Commonly known as: DIOVAN  Take 1 tablet (320 mg total) by mouth once daily.            STOP taking these medications      amLODIPine 5 MG tablet  Commonly known as: NORVASC  Stopped by: Kelly Choi MD              Signing Physician:  Kelly Choi MD

## 2024-07-11 ENCOUNTER — LAB VISIT (OUTPATIENT)
Dept: LAB | Facility: HOSPITAL | Age: 74
End: 2024-07-11
Attending: INTERNAL MEDICINE
Payer: MEDICARE

## 2024-07-11 ENCOUNTER — OFFICE VISIT (OUTPATIENT)
Dept: PRIMARY CARE CLINIC | Facility: CLINIC | Age: 74
End: 2024-07-11
Payer: MEDICARE

## 2024-07-11 VITALS
HEART RATE: 90 BPM | WEIGHT: 233.94 LBS | DIASTOLIC BLOOD PRESSURE: 78 MMHG | OXYGEN SATURATION: 96 % | HEIGHT: 71 IN | SYSTOLIC BLOOD PRESSURE: 142 MMHG | BODY MASS INDEX: 32.75 KG/M2

## 2024-07-11 DIAGNOSIS — R74.01 TRANSAMINITIS: ICD-10-CM

## 2024-07-11 DIAGNOSIS — N17.9 AKI (ACUTE KIDNEY INJURY): ICD-10-CM

## 2024-07-11 DIAGNOSIS — I48.0 PAROXYSMAL ATRIAL FIBRILLATION: Chronic | ICD-10-CM

## 2024-07-11 DIAGNOSIS — Z79.01 CHRONIC ANTICOAGULATION: Chronic | ICD-10-CM

## 2024-07-11 DIAGNOSIS — R65.20 SEPSIS WITH ACUTE RENAL FAILURE WITHOUT SEPTIC SHOCK, DUE TO UNSPECIFIED ORGANISM, UNSPECIFIED ACUTE RENAL FAILURE TYPE: Primary | ICD-10-CM

## 2024-07-11 DIAGNOSIS — M62.82 NON-TRAUMATIC RHABDOMYOLYSIS: ICD-10-CM

## 2024-07-11 DIAGNOSIS — J18.9 PNEUMONIA OF LEFT LOWER LOBE DUE TO INFECTIOUS ORGANISM: ICD-10-CM

## 2024-07-11 DIAGNOSIS — N17.9 SEPSIS WITH ACUTE RENAL FAILURE WITHOUT SEPTIC SHOCK, DUE TO UNSPECIFIED ORGANISM, UNSPECIFIED ACUTE RENAL FAILURE TYPE: Primary | ICD-10-CM

## 2024-07-11 DIAGNOSIS — A41.9 SEPSIS WITH ACUTE RENAL FAILURE WITHOUT SEPTIC SHOCK, DUE TO UNSPECIFIED ORGANISM, UNSPECIFIED ACUTE RENAL FAILURE TYPE: Primary | ICD-10-CM

## 2024-07-11 LAB
ALBUMIN SERPL BCP-MCNC: 3.2 G/DL (ref 3.5–5.2)
ALP SERPL-CCNC: 89 U/L (ref 55–135)
ALT SERPL W/O P-5'-P-CCNC: 53 U/L (ref 10–44)
ANION GAP SERPL CALC-SCNC: 7 MMOL/L (ref 8–16)
AST SERPL-CCNC: 45 U/L (ref 10–40)
BILIRUB SERPL-MCNC: 0.4 MG/DL (ref 0.1–1)
BUN SERPL-MCNC: 23 MG/DL (ref 8–23)
CALCIUM SERPL-MCNC: 9.1 MG/DL (ref 8.7–10.5)
CHLORIDE SERPL-SCNC: 107 MMOL/L (ref 95–110)
CO2 SERPL-SCNC: 25 MMOL/L (ref 23–29)
CREAT SERPL-MCNC: 1.6 MG/DL (ref 0.5–1.4)
EST. GFR  (NO RACE VARIABLE): 45 ML/MIN/1.73 M^2
GLUCOSE SERPL-MCNC: 84 MG/DL (ref 70–110)
POTASSIUM SERPL-SCNC: 4.2 MMOL/L (ref 3.5–5.1)
PROT SERPL-MCNC: 7.9 G/DL (ref 6–8.4)
SODIUM SERPL-SCNC: 139 MMOL/L (ref 136–145)

## 2024-07-11 PROCEDURE — 80053 COMPREHEN METABOLIC PANEL: CPT | Performed by: INTERNAL MEDICINE

## 2024-07-11 PROCEDURE — 36415 COLL VENOUS BLD VENIPUNCTURE: CPT | Performed by: INTERNAL MEDICINE

## 2024-07-11 PROCEDURE — 99999 PR PBB SHADOW E&M-EST. PATIENT-LVL III: CPT | Mod: PBBFAC,,, | Performed by: INTERNAL MEDICINE

## 2024-07-26 ENCOUNTER — EXTERNAL HOME HEALTH (OUTPATIENT)
Dept: HOME HEALTH SERVICES | Facility: HOSPITAL | Age: 74
End: 2024-07-26
Payer: MEDICARE

## 2024-09-23 PROBLEM — N17.9 AKI (ACUTE KIDNEY INJURY): Status: RESOLVED | Noted: 2024-06-20 | Resolved: 2024-09-23

## 2024-09-23 PROBLEM — J18.9 PNEUMONIA OF LEFT LOWER LOBE DUE TO INFECTIOUS ORGANISM: Status: RESOLVED | Noted: 2024-06-22 | Resolved: 2024-09-23

## 2024-12-02 ENCOUNTER — LAB VISIT (OUTPATIENT)
Dept: LAB | Facility: HOSPITAL | Age: 74
End: 2024-12-02
Attending: INTERNAL MEDICINE
Payer: MEDICARE

## 2024-12-02 DIAGNOSIS — N18.9 CHRONIC KIDNEY DISEASE: Primary | ICD-10-CM

## 2024-12-02 LAB
ALBUMIN SERPL BCP-MCNC: 4.2 G/DL (ref 3.5–5.2)
ALP SERPL-CCNC: 72 U/L (ref 38–126)
ALT SERPL W/O P-5'-P-CCNC: 28 U/L (ref 10–44)
ANION GAP SERPL CALC-SCNC: 8 MMOL/L (ref 8–16)
AST SERPL-CCNC: 30 U/L (ref 15–46)
BASOPHILS # BLD AUTO: 0.07 K/UL (ref 0–0.2)
BASOPHILS NFR BLD: 1.2 % (ref 0–1.9)
BILIRUB SERPL-MCNC: 0.6 MG/DL (ref 0.1–1)
CALCIUM SERPL-MCNC: 8.9 MG/DL (ref 8.7–10.5)
CHLORIDE SERPL-SCNC: 109 MMOL/L (ref 95–110)
CO2 SERPL-SCNC: 26 MMOL/L (ref 23–29)
CREAT SERPL-MCNC: 1.5 MG/DL (ref 0.5–1.4)
DIFFERENTIAL METHOD BLD: ABNORMAL
EOSINOPHIL # BLD AUTO: 0.3 K/UL (ref 0–0.5)
EOSINOPHIL NFR BLD: 4.6 % (ref 0–8)
ERYTHROCYTE [DISTWIDTH] IN BLOOD BY AUTOMATED COUNT: 13.2 % (ref 11.5–14.5)
EST. GFR  (NO RACE VARIABLE): 48 ML/MIN/1.73 M^2
GLUCOSE SERPL-MCNC: 112 MG/DL (ref 70–110)
HCT VFR BLD AUTO: 37.3 % (ref 40–54)
HGB BLD-MCNC: 11.5 G/DL (ref 14–18)
IMM GRANULOCYTES # BLD AUTO: 0.01 K/UL (ref 0–0.04)
IMM GRANULOCYTES NFR BLD AUTO: 0.2 % (ref 0–0.5)
LYMPHOCYTES # BLD AUTO: 1.1 K/UL (ref 1–4.8)
LYMPHOCYTES NFR BLD: 18.9 % (ref 18–48)
MCH RBC QN AUTO: 26.7 PG (ref 27–31)
MCHC RBC AUTO-ENTMCNC: 30.8 G/DL (ref 32–36)
MCV RBC AUTO: 87 FL (ref 82–98)
MONOCYTES # BLD AUTO: 0.6 K/UL (ref 0.3–1)
MONOCYTES NFR BLD: 10.2 % (ref 4–15)
NEUTROPHILS # BLD AUTO: 3.6 K/UL (ref 1.8–7.7)
NEUTROPHILS NFR BLD: 64.9 % (ref 38–73)
NRBC BLD-RTO: 0 /100 WBC
PLATELET # BLD AUTO: 167 K/UL (ref 150–450)
PMV BLD AUTO: 12 FL (ref 9.2–12.9)
POTASSIUM SERPL-SCNC: 4 MMOL/L (ref 3.5–5.1)
PROT SERPL-MCNC: 7.6 G/DL (ref 6–8.4)
RBC # BLD AUTO: 4.3 M/UL (ref 4.6–6.2)
SODIUM SERPL-SCNC: 143 MMOL/L (ref 136–145)
URATE SERPL-MCNC: 7.3 MG/DL (ref 3.4–7)
UUN UR-MCNC: 26 MG/DL (ref 2–20)
WBC # BLD AUTO: 5.61 K/UL (ref 3.9–12.7)

## 2024-12-02 PROCEDURE — 36415 COLL VENOUS BLD VENIPUNCTURE: CPT | Mod: PN | Performed by: INTERNAL MEDICINE

## 2024-12-02 PROCEDURE — 80053 COMPREHEN METABOLIC PANEL: CPT | Mod: PN | Performed by: INTERNAL MEDICINE

## 2024-12-02 PROCEDURE — 84550 ASSAY OF BLOOD/URIC ACID: CPT | Performed by: INTERNAL MEDICINE

## 2024-12-02 PROCEDURE — 85025 COMPLETE CBC W/AUTO DIFF WBC: CPT | Mod: PN | Performed by: INTERNAL MEDICINE

## 2025-01-15 DIAGNOSIS — M17.0 PRIMARY OSTEOARTHRITIS OF BOTH KNEES: Primary | ICD-10-CM

## 2025-02-28 DIAGNOSIS — G89.29 CHRONIC PAIN OF BOTH KNEES: Primary | ICD-10-CM

## 2025-02-28 DIAGNOSIS — M25.562 CHRONIC PAIN OF BOTH KNEES: Primary | ICD-10-CM

## 2025-02-28 DIAGNOSIS — M25.561 CHRONIC PAIN OF BOTH KNEES: Primary | ICD-10-CM

## 2025-03-06 ENCOUNTER — HOSPITAL ENCOUNTER (OUTPATIENT)
Dept: RADIOLOGY | Facility: HOSPITAL | Age: 75
Discharge: HOME | End: 2025-03-06
Attending: ORTHOPAEDIC SURGERY
Payer: MEDICARE

## 2025-03-06 ENCOUNTER — OFFICE VISIT (OUTPATIENT)
Dept: ORTHOPEDICS | Facility: CLINIC | Age: 75
End: 2025-03-06
Payer: MEDICARE

## 2025-03-06 VITALS — BODY MASS INDEX: 32.75 KG/M2 | HEIGHT: 71 IN | WEIGHT: 233.94 LBS

## 2025-03-06 DIAGNOSIS — G89.29 CHRONIC PAIN OF BOTH KNEES: ICD-10-CM

## 2025-03-06 DIAGNOSIS — M17.0 PRIMARY OSTEOARTHRITIS OF BOTH KNEES: ICD-10-CM

## 2025-03-06 DIAGNOSIS — M25.561 CHRONIC PAIN OF BOTH KNEES: ICD-10-CM

## 2025-03-06 DIAGNOSIS — M25.562 CHRONIC PAIN OF BOTH KNEES: ICD-10-CM

## 2025-03-06 PROCEDURE — 99203 OFFICE O/P NEW LOW 30 MIN: CPT | Mod: S$GLB,,, | Performed by: ORTHOPAEDIC SURGERY

## 2025-03-06 PROCEDURE — 3288F FALL RISK ASSESSMENT DOCD: CPT | Mod: CPTII,S$GLB,, | Performed by: ORTHOPAEDIC SURGERY

## 2025-03-06 PROCEDURE — 99999 PR PBB SHADOW E&M-EST. PATIENT-LVL III: CPT | Mod: PBBFAC,,, | Performed by: ORTHOPAEDIC SURGERY

## 2025-03-06 PROCEDURE — 1159F MED LIST DOCD IN RCRD: CPT | Mod: CPTII,S$GLB,, | Performed by: ORTHOPAEDIC SURGERY

## 2025-03-06 PROCEDURE — 1160F RVW MEDS BY RX/DR IN RCRD: CPT | Mod: CPTII,S$GLB,, | Performed by: ORTHOPAEDIC SURGERY

## 2025-03-06 PROCEDURE — 1125F AMNT PAIN NOTED PAIN PRSNT: CPT | Mod: CPTII,S$GLB,, | Performed by: ORTHOPAEDIC SURGERY

## 2025-03-06 PROCEDURE — 1101F PT FALLS ASSESS-DOCD LE1/YR: CPT | Mod: CPTII,S$GLB,, | Performed by: ORTHOPAEDIC SURGERY

## 2025-03-06 PROCEDURE — 73564 X-RAY EXAM KNEE 4 OR MORE: CPT | Mod: 26,50,, | Performed by: RADIOLOGY

## 2025-03-06 NOTE — PROGRESS NOTES
Subjective:      Patient ID: Deep Farah is a 74 y.o. male.    Chief Complaint: Pain of the Left Knee and Pain of the Right Knee    HPI     They have experienced problems with their right knee over the past many years. The patient reports relevant history of injury/aggravation.  The patient reports a basketball injury which was treated arthroscopically many years ago.  The patient reports that he has mild left knee symptoms that do not require treatment at this time.  Pain is located  diffusely about the right knee  Associated symptoms include  stiffness and swelling.  Symptoms are aggravated by prolonged walking. They have been treated with injections with minimal benefit.   Symptoms have recently worsened. Ambulation reportedly has been impaired. Self care ADLs are not painful.     Review of Systems   Constitutional: Negative for fever and weight loss.   HENT:  Negative for congestion.    Eyes:  Negative for visual disturbance.   Cardiovascular:  Negative for chest pain.   Respiratory:  Negative for shortness of breath.    Hematologic/Lymphatic: Negative for bleeding problem. Does not bruise/bleed easily.   Skin:  Negative for poor wound healing.   Musculoskeletal:  Positive for joint pain and joint swelling.   Gastrointestinal:  Negative for abdominal pain.   Genitourinary:  Negative for dysuria.   Neurological:  Negative for seizures.   Psychiatric/Behavioral:  Negative for altered mental status.    Allergic/Immunologic: Negative for persistent infections.         Objective:      Ortho/SPM Exam      Right knee    [unfilled]    The patient is not in acute distress.   Sclerae normal  Body habitus is normal.  Respiratory distress:  none   The patient walks with a limp.  Hip irritability  negative.   The skin over the knee is intact.  Knee effusion 2+  Palpation- nontender  Range of motion-   deg,   Ligament laxity exam:  2+ valgus laxity  Popliteal cyst negative  Patellar crepitation absent.  Flexion/pinch  negative  Pulses DP present, PT present.  Motor normal 5/5 strength in all tested muscle groups.   Sensory normal.      IMAGING- right knee radiographs show tricompartmental loss of joint space with extensive osteophytes, Kellgren stage IV.  Left knee has stage III changes        Assessment:       Encounter Diagnosis   Name Primary?    Primary osteoarthritis of both knees             The condition is structurally advanced.  It has not responded to nonsurgical measures.  The patient has significant pain and functional impairment.  The only intervention likely to be meaningfully beneficial is arthroplasty.        Plan:       Deep was seen today for pain and pain.    Diagnoses and all orders for this visit:    Primary osteoarthritis of both knees  -     Ambulatory referral/consult to Orthopedics            I explained my diagnostic impression and the reasoning behind it in detail, using layman's terms.  Models and/or pictures were used to help in the explanation.     I explained the role of total knee replacement in the treatment of this condition.  I reviewed the nature of the operation, the expected clinical course and recovery period.  I also discussed the typical complications that are associated with the procedure.  The option of continued nonsurgical care was also reviewed.  The patient indicates that they consider options before committing.  I explained the scheduling process and gave the patient information about our academy web site.

## 2025-04-10 ENCOUNTER — OFFICE VISIT (OUTPATIENT)
Dept: ORTHOPEDICS | Facility: CLINIC | Age: 75
End: 2025-04-10
Payer: MEDICARE

## 2025-04-10 ENCOUNTER — TELEPHONE (OUTPATIENT)
Dept: ORTHOPEDICS | Facility: CLINIC | Age: 75
End: 2025-04-10

## 2025-04-10 VITALS — BODY MASS INDEX: 32.75 KG/M2 | HEIGHT: 71 IN | WEIGHT: 233.94 LBS

## 2025-04-10 DIAGNOSIS — M17.0 PRIMARY OSTEOARTHRITIS OF BOTH KNEES: Primary | ICD-10-CM

## 2025-04-10 DIAGNOSIS — Z96.651 S/P TOTAL KNEE ARTHROPLASTY, RIGHT: Primary | ICD-10-CM

## 2025-04-10 PROCEDURE — 1101F PT FALLS ASSESS-DOCD LE1/YR: CPT | Mod: CPTII,S$GLB,, | Performed by: ORTHOPAEDIC SURGERY

## 2025-04-10 PROCEDURE — 1125F AMNT PAIN NOTED PAIN PRSNT: CPT | Mod: CPTII,S$GLB,, | Performed by: ORTHOPAEDIC SURGERY

## 2025-04-10 PROCEDURE — 1159F MED LIST DOCD IN RCRD: CPT | Mod: CPTII,S$GLB,, | Performed by: ORTHOPAEDIC SURGERY

## 2025-04-10 PROCEDURE — 1160F RVW MEDS BY RX/DR IN RCRD: CPT | Mod: CPTII,S$GLB,, | Performed by: ORTHOPAEDIC SURGERY

## 2025-04-10 PROCEDURE — 99214 OFFICE O/P EST MOD 30 MIN: CPT | Mod: S$GLB,,, | Performed by: ORTHOPAEDIC SURGERY

## 2025-04-10 PROCEDURE — 3288F FALL RISK ASSESSMENT DOCD: CPT | Mod: CPTII,S$GLB,, | Performed by: ORTHOPAEDIC SURGERY

## 2025-04-10 PROCEDURE — 99999 PR PBB SHADOW E&M-EST. PATIENT-LVL III: CPT | Mod: PBBFAC,,, | Performed by: ORTHOPAEDIC SURGERY

## 2025-04-10 NOTE — PROGRESS NOTES
Subjective:      Patient ID: Deep Farah is a 74 y.o. male.    Chief Complaint: Pain of the Right Knee    HPI    Follow-up for osteoarthritis.  The patient reports that is right knee is causing significant pain with all ambulation.  He has tried extensive nonsurgical measures and none have controlled symptoms.          Review of Systems   Constitutional: Negative for fever and weight loss.   HENT:  Negative for congestion.    Eyes:  Negative for visual disturbance.   Cardiovascular:  Negative for chest pain.   Respiratory:  Negative for shortness of breath.    Hematologic/Lymphatic: Negative for bleeding problem. Does not bruise/bleed easily.   Skin:  Negative for poor wound healing.   Musculoskeletal:  Positive for joint pain.   Gastrointestinal:  Negative for abdominal pain.   Genitourinary:  Negative for dysuria.   Neurological:  Negative for seizures.   Psychiatric/Behavioral:  Negative for altered mental status.    Allergic/Immunologic: Negative for persistent infections.         Objective:      Ortho/SPM Exam        Right knee    The patient is not in acute distress.   Sclerae normal  Body habitus is normal.  Respiratory distress:  none   The patient walks with a limp.  Hip irritability  negative.   The skin over the knee is intact.  Knee effusion 2+  Palpation- nontender  Range of motion-   deg,   Ligament laxity exam:  2+ valgus laxity  Popliteal cyst negative  Patellar crepitation absent.  Flexion/pinch negative  Pulses DP present, PT present.  Motor normal 5/5 strength in all tested muscle groups.   Sensory normal.    Right knee radiographs show tricompartmental loss of joint space, Kellgren stage IV          Assessment:       Encounter Diagnosis   Name Primary?    Primary osteoarthritis of both knees Yes        The condition is extremely advanced.  The only intervention likely to result in improved pain control and mobility is arthroplasty          Plan:       Deep was seen today for  pain.    Diagnoses and all orders for this visit:    Primary osteoarthritis of both knees          I explained my diagnostic impression and the reasoning behind it in detail, using layman's terms.  Models and/or pictures were used to help in the explanation.    Treatment options were discussed. The surgical process of right knee replacement was discussed in detail with the patient including a detailed discussion of the procedure itself (including visual model, x-ray review, and literature review).  I explained that patellar resurfacing is an optional part of the procedure, and that I would make an intraoperative decision as to whether or not it is necessary.  The typical perioperative and post-operative course was discussed and perioperative risks were discussed to the patient's satisfaction.  Risks and complications discussed included but were not limited to the risks of anesthetic complications, infection, bleeding, wound healing complications, stiffness, aseptic loosening, instability, limb length inequality, neurologic dysfunction including numbness and weakness, additional surgery,  DVT, pulmonary embolism, perioperative medical risks (cardiac, pulmonary, renal, neurologic), and death and the patient elects to proceed.      Preoperative cardiology consultation was requested

## 2025-04-24 ENCOUNTER — TELEPHONE (OUTPATIENT)
Dept: PREADMISSION TESTING | Facility: HOSPITAL | Age: 75
End: 2025-04-24
Payer: MEDICARE

## 2025-04-24 DIAGNOSIS — M17.11 PRIMARY OSTEOARTHRITIS OF RIGHT KNEE: Primary | ICD-10-CM

## 2025-04-24 RX ORDER — SODIUM CHLORIDE 0.9 % (FLUSH) 0.9 %
3 SYRINGE (ML) INJECTION EVERY 8 HOURS
OUTPATIENT
Start: 2025-04-24

## 2025-04-24 RX ORDER — NAPROXEN 500 MG/1
500 TABLET ORAL
OUTPATIENT
Start: 2025-04-24 | End: 2025-04-24

## 2025-04-24 RX ORDER — ACETAMINOPHEN 500 MG
1000 TABLET ORAL
OUTPATIENT
Start: 2025-04-24 | End: 2025-04-24

## 2025-04-24 RX ORDER — CEFAZOLIN SODIUM 2 G/50ML
2 SOLUTION INTRAVENOUS
OUTPATIENT
Start: 2025-04-24

## 2025-04-24 RX ORDER — MUPIROCIN 20 MG/G
OINTMENT TOPICAL
OUTPATIENT
Start: 2025-04-24

## 2025-05-13 DIAGNOSIS — R73.9 HYPERGLYCEMIA: ICD-10-CM

## 2025-05-13 DIAGNOSIS — Z01.818 PREOP EXAMINATION: Primary | ICD-10-CM

## 2025-05-15 ENCOUNTER — TELEPHONE (OUTPATIENT)
Dept: ORTHOPEDICS | Facility: CLINIC | Age: 75
End: 2025-05-15
Payer: MEDICARE

## 2025-05-15 NOTE — TELEPHONE ENCOUNTER
Left message to contact to contact clinic re: rescheduling Pre op appointment. Pre op has been tentatively rescheduled with PA on 05/27 at 1:00.

## 2025-05-15 NOTE — TELEPHONE ENCOUNTER
----- Message from James sent at 5/15/2025 10:04 AM CDT -----  Type: General Call Back Name of Caller:pt Reason pt needs to reschedule preop, pt surgery is on 06/04Would the patient rather a call back or a response via [facility]? Call Best Call Back Number: [phone] Information:

## 2025-05-22 ENCOUNTER — TELEPHONE (OUTPATIENT)
Dept: PREADMISSION TESTING | Facility: HOSPITAL | Age: 75
End: 2025-05-22
Payer: MEDICARE

## 2025-05-22 NOTE — TELEPHONE ENCOUNTER
Faxed Eliquis recommendations to [phone] on 4/2/25 and called for recommendations on 5/14/25. Awaiting a return call.

## 2025-05-26 ENCOUNTER — TELEPHONE (OUTPATIENT)
Dept: ANESTHESIOLOGY | Facility: HOSPITAL | Age: 75
End: 2025-05-26
Payer: MEDICARE

## 2025-05-26 ENCOUNTER — HOSPITAL ENCOUNTER (OUTPATIENT)
Dept: PREADMISSION TESTING | Facility: HOSPITAL | Age: 75
Discharge: HOME | End: 2025-05-26
Attending: NURSE PRACTITIONER
Payer: MEDICARE

## 2025-05-26 ENCOUNTER — ANESTHESIA EVENT (OUTPATIENT)
Dept: SURGERY | Facility: HOSPITAL | Age: 75
End: 2025-05-26
Payer: MEDICARE

## 2025-05-26 RX ORDER — MULTIVITAMIN
1 TABLET ORAL DAILY
COMMUNITY

## 2025-05-26 NOTE — ANESTHESIA PREPROCEDURE EVALUATION
[facility]  Anesthesia Pre-Operative Evaluation         Patient Name: Deep Farah  YOB: 1950  MRN: [id number]    SUBJECTIVE:     Pre-operative evaluation for Procedure(s) (LRB):  ARTHROPLASTY, KNEE (Right)     06/03/2025    Deep Farah is a 74 y.o. male w/ a significant PMHx of HTN, HLD, A-fibb, HFrEF (40-45%) PAP of 33, ICD placement, CKD 3, CAD, [place] Palsy    AICD for CRT-D therapy  Has never had a discharge  Afib on Eliquis -- Last Dose 06/01/25  Aware of low dose spinal and slow dosed Epidural for anesthetic plan.  Hx of anesthetics in past without complications  NPO>8 hours  No food or drug allergies  Amenable to proceed with scheduled procedure      Patient now presents for the above procedure(s).    Echo Summary  Results for orders placed during the hospital encounter of 06/20/24    Echo    Interpretation Summary    Left Ventricle: The left ventricle is mildly dilated. Normal wall thickness. There is mildly reduced systolic function with a visually estimated ejection fraction of 40 - 45%. There is diastolic dysfunction but grade cannot be determined.    Right Ventricle: Normal right ventricular cavity size. Systolic function is normal. Catheter present in the ventricle. Pacemaker lead present in the ventricle.    Left Atrium: Left atrium is moderately dilated. The left atrium volume index is 44.8 mL/m2.    Right Atrium: Right atrium is moderately dilated.    Mitral Valve: There is mild regurgitation.    Pulmonary Artery: The estimated pulmonary artery systolic pressure is 33 mmHg.    IVC/SVC: Normal venous pressure at 3 mmHg.           Patient Active Problem List   Diagnosis    Chronic anticoagulation    Chronic systolic congestive heart failure, NYHA class 2    History of cardiac radiofrequency ablation    Paroxysmal atrial fibrillation    Hypothyroidism    Non-traumatic rhabdomyolysis    Hypertension    Chronic gout    Coronary artery disease involving native coronary  artery of native heart without angina pectoris    Hyperlipidemia    Left-sided Bell's palsy    Transaminitis    Hyperglycemia    CKD stage 3a, GFR 45-59 ml/min    Normocytic anemia    Diarrhea       Review of patient's allergies indicates:   Allergen Reactions    Pcn [penicillins] Swelling       Current Inpatient Medications:      Medications Ordered Prior to Encounter[1]    Past Surgical History:   Procedure Laterality Date    CARDIAC DEFIBRILLATOR PLACEMENT      PROSTATE SURGERY         Social History:  Tobacco Use: Medium Risk (5/27/2025)    Patient History     Smoking Tobacco Use: Former     Smokeless Tobacco Use: Never     Passive Exposure: Not on file      Alcohol Use: Not At Risk (6/21/2024)    AUDIT-C     Frequency of Alcohol Consumption: Monthly or less     Average Number of Drinks: 3 or 4     Frequency of Binge Drinking: Less than monthly        OBJECTIVE:     Vital Signs Range (Last 24H):         Significant Labs:  Lab Results   Component Value Date    WBC 6.30 05/27/2025    HGB 12.2 (L) 05/27/2025    HCT 38.3 (L) 05/27/2025     05/27/2025    ALT 29 05/27/2025    AST 40 05/27/2025     05/27/2025    K 4.1 05/27/2025     (H) 05/27/2025    CREATININE 1.8 (H) 05/27/2025    BUN 32 (H) 05/27/2025    CO2 25 05/27/2025    TSH 0.470 06/20/2024    INR 1.3 (H) 03/24/2023    HGBA1C 5.5 05/27/2025       Diagnostic Studies: No relevant studies.    EKG:   Results for orders placed or performed during the hospital encounter of 06/20/24   EKG 12-lead    Collection Time: 06/20/24  9:36 PM   Result Value Ref Range    QRS Duration 108 ms    [facility] QTC Calculation 482 ms    Narrative    Test Reason : R00.0,    Vent. Rate : 114 BPM     Atrial Rate : 114 BPM     P-R Int : 156 ms          QRS Dur : 108 ms      QT Int : 350 ms       P-R-T Axes : 067 -07 072 degrees     QTc Int : 482 ms    Sinus tachycardia  Minimal voltage criteria for LVH, may be normal variant ( R in aVL )  Nonspecific ST abnormality  Abnormal  ECG  No previous ECGs available  Confirmed by Aurelio Pineda MD ([id number]) on 6/21/2024 7:59:05 PM    Referred By: [email or web address]   SELF           Confirmed By:Aurelio Pineda MD       2D ECHO:  TTE:  Results for orders placed or performed during the hospital encounter of 06/20/24   Echo   Result Value Ref Range    Hicks's Biplane MOD Ejection Fraction 34 %    A2C EF 30 %    A4C EF 37 %    LVOT stroke volume 76.16 cm3    LVIDd 6.00 3.5 - 6.0 cm    LV Systolic Volume 122.51 mL    LV Systolic Volume Index 54.7 mL/m2    LVIDs 5.08 (A) 2.1 - 4.0 cm    LV ESV A2C 71.39 mL    LV Diastolic Volume 180.05 mL    LV ESV A4C 90.73 mL    LV Diastolic Volume Index 80.38 mL/m2    LV EDV A2C 97.711201514015784 mL    LV EDV A4C 120.34 mL    Left Ventricular End Systolic Volume by Teichholz Method 122.51 mL    Left Ventricular End Diastolic Volume by Teichholz Method 180.05 mL    IVS 0.77 0.6 - 1.1 cm    LVOT diameter 1.99 cm    LVOT area 3.1 cm2    FS 15 (A) 28 - 44 %    Left Ventricle Relative Wall Thickness 0.29 cm    PW 0.86 0.6 - 1.1 cm    LV mass 190.46 g    LV Mass Index 85 g/m2    MV Peak E Be 0.95 m/s    TDI LATERAL 0.07 m/s    MV Peak A Be 1.03 m/s    TR Max Be 2.75 m/s    E/A ratio 0.92     IVRT 88.49 msec    E wave deceleration time 248.95 msec    LV LATERAL E/E' RATIO 13.57 m/s    PV Peak S Be 0.39 m/s    PV Peak D Be 0.54 m/s    Pulm vein S/D ratio 0.72     LVOT peak be 1.28 m/s    Left Ventricular Outflow Tract Mean Velocity 1.00 cm/s    Left Ventricular Outflow Tract Mean Gradient 4.45 mmHg    RV S' 12.51 cm/s    TAPSE 2.51 cm    LA size 4.24 cm    Left Atrium Minor Axis 6.59 cm    Left Atrium Major Axis 5.83 cm    LA Vol (MOD) 81.71 cm3    YOLIE (MOD) 36.5 mL/m2    RA Major Axis 5.56 cm    RA Width 4.19 cm    AV mean gradient 10 mmHg    AV peak gradient 14 mmHg    Ao peak be 1.84 m/s    Ao VTI 29.00 cm    LVOT peak VTI 24.50 cm    AV valve area 2.63 cm²    AV Velocity Ratio 0.70     AV index (prosthetic)  0.84     ANGELINA by Velocity Ratio 2.16 cm²    MV stenosis pressure 1/2 time 72.20 ms    MV valve area p 1/2 method 3.05 cm2    Triscuspid Valve Regurgitation Peak Gradient 30 mmHg    PV PEAK VELOCITY 1.63 m/s    PV peak gradient 11 mmHg    Pulmonary Valve Mean Velocity 1.19 m/s    Sinus 3.36 cm    STJ 3.28 cm    Ascending aorta 3.01 cm    ZLVIDS 0.36     ZLVIDD -2.89     LA area A4C 25.36 cm2    LA area A2C 22.35 cm2    BSA 2.29 m2    YOLIE 44.8 mL/m2    LA Vol 100.34 cm3    LA WIDTH 4.5 cm    TV resting pulmonary artery pressure 33 mmHg    RV TB RVSP 6 mmHg    Est. RA pres 3 mmHg    Narrative      Left Ventricle: The left ventricle is mildly dilated. Normal wall   thickness. There is mildly reduced systolic function with a visually   estimated ejection fraction of 40 - 45%. There is diastolic dysfunction   but grade cannot be determined.    Right Ventricle: Normal right ventricular cavity size. Systolic   function is normal. Catheter present in the ventricle. Pacemaker lead   present in the ventricle.    Left Atrium: Left atrium is moderately dilated. The left atrium volume   index is 44.8 mL/m2.    Right Atrium: Right atrium is moderately dilated.    Mitral Valve: There is mild regurgitation.    Pulmonary Artery: The estimated pulmonary artery systolic pressure is   33 mmHg.    IVC/SVC: Normal venous pressure at 3 mmHg.         SERGE:  No results found for this or any previous visit.    ASSESSMENT/PLAN:           Pre-op Assessment    I have reviewed the Patient Summary Reports.     I have reviewed the Nursing Notes. I have reviewed the NPO Status.   I have reviewed the Medications.     Review of Systems  Anesthesia Hx:  No problems with previous Anesthesia   History of prior surgery of interest to airway management or planning:          Denies Family Hx of Anesthesia complications.    Denies Personal Hx of Anesthesia complications.                    Social:  Former Smoker, Alcohol Use       Hematology/Oncology:        -- Anemia:                                  Cardiovascular:  Exercise tolerance: good  Pacemaker Hypertension   CAD    Denies CABG/stent. Dysrhythmias atrial fibrillation  Denies Angina. CHF   PVD hyperlipidemia DAN                          Disorder of Cardiac Rhythm, Atrial Fibrillation, Paroxysmal Atrial Fibrillation     Pulmonary:       Denies Shortness of breath.  Sleep Apnea (diagnosed years ago but no longer uses CPAP due to comfort)           Education provided regarding risk of obstructive sleep apnea            Renal/:  Chronic Renal Disease, CKD                Hepatic/GI:  Hepatic/GI Normal        Not Taking GLP-1 Agonists            Neurological:  Denies TIA.  Denies CVA. Neuromuscular Disease,   Denies Seizures.                                Endocrine:  Denies Diabetes. Hypothyroidism              Physical Exam  General: Cooperative, Oriented, Well nourished and Alert    Airway:  Mallampati: I / I  TM Distance: Normal  Tongue: Normal  Neck ROM: Normal ROM    Dental:  Intact, Periodontal disease      Cardiac Implanted Device  Type: Dual chamber ICD implanted 6/15/2023  : Vecast  Primary indication for placement:   Pacing-dependent:   Current settings: Mode AAIR<=>DDDR Lower Rate 60 bpm Paced  ms  Mode Switch 171 bpm Upper Track 130 bpm Sensed  ms  Upper Sensor 130 bpm  Most recent interrogation: 4/3/25   Presenting rhythm: asense vsense  Underlying rhythm: asense vsense  16.2%AP, 1.2%  RA: Impedance 494 ohms, Sensing 3 mV, Capture threshold 0.875V@0.4msec  RV: Pace Impedance 361 ohms, HV Impedance 58 ohms, Sensing 3.4 mV, Capture threshold 0.875V@0.4msec  Battery life 11.1 years  No tachytherapies  10% AF burden    Lab Results   Component Value Date    WBC 5.61 12/02/2024    HGB 11.5 (L) 12/02/2024    HCT 37.3 (L) 12/02/2024     12/02/2024    ALT 28 12/02/2024    AST 30 12/02/2024     12/02/2024    K 4.0 12/02/2024     12/02/2024    CREATININE 1.50 (H)  12/02/2024    BUN 26 (H) 12/02/2024    CO2 26 12/02/2024    TSH 0.470 06/20/2024    INR 1.3 (H) 03/24/2023    HGBA1C 5.0 06/20/2024     Results for orders placed or performed during the hospital encounter of 06/20/24   EKG 12-lead    Collection Time: 06/20/24  9:36 PM   Result Value Ref Range    QRS Duration 108 ms    [facility] QTC Calculation 482 ms    Narrative    Test Reason : R00.0,    Vent. Rate : 114 BPM     Atrial Rate : 114 BPM     P-R Int : 156 ms          QRS Dur : 108 ms      QT Int : 350 ms       P-R-T Axes : 067 -07 072 degrees     QTc Int : 482 ms    Sinus tachycardia  Minimal voltage criteria for LVH, may be normal variant ( R in aVL )  Nonspecific ST abnormality  Abnormal ECG  No previous ECGs available  Confirmed by Aurelio Pineda MD ([id number]) on 6/21/2024 7:59:05 PM    Referred By: [email or web address]   SELF           Confirmed By:Aurelio Pineda MD     Results for orders placed during the hospital encounter of 06/20/24    Echo    Interpretation Summary    Left Ventricle: The left ventricle is mildly dilated. Normal wall thickness. There is mildly reduced systolic function with a visually estimated ejection fraction of 40 - 45%. There is diastolic dysfunction but grade cannot be determined.    Right Ventricle: Normal right ventricular cavity size. Systolic function is normal. Catheter present in the ventricle. Pacemaker lead present in the ventricle.    Left Atrium: Left atrium is moderately dilated. The left atrium volume index is 44.8 mL/m2.    Right Atrium: Right atrium is moderately dilated.    Mitral Valve: There is mild regurgitation.    Pulmonary Artery: The estimated pulmonary artery systolic pressure is 33 mmHg.    IVC/SVC: Normal venous pressure at 3 mmHg.      Anesthesia Plan  Type of Anesthesia, risks & benefits discussed:    Anesthesia Type: Spinal, Regional, CSE, Epidural, Gen ETT  Intra-op Monitoring Plan: Standard ASA Monitors  Post Op Pain Control Plan: multimodal analgesia, peripheral  nerve block, IV/PO Opioids PRN and intrathecal opioid  Induction:  IV  Airway Plan: , Post-Induction  Informed Consent: Informed consent signed with the Patient and all parties understand the risks and agree with anesthesia plan.  All questions answered. Patient consented to blood products? No  ASA Score: 3  Anesthesia Plan Notes: Anesthesia consent to be signed prior to surgery 6/4/25      Ready For Surgery From Anesthesia Perspective.     .           [1]   No current facility-administered medications on file prior to encounter.     Current Outpatient Medications on File Prior to Encounter   Medication Sig Dispense Refill    allopurinoL (ZYLOPRIM) 100 MG tablet Take 100 mg by mouth once daily.      bumetanide (BUMEX) 1 MG tablet Take 1 tablet (1 mg total) by mouth daily as needed (For weight gain >2 lbs in 24 hours or 5 lbs over 3 days.). 30 tablet 11    carvediloL (COREG) 6.25 MG tablet Take 6.25 mg by mouth 2 (two) times daily.      ELIQUIS 5 mg Tab Take 5 mg by mouth 2 (two) times daily.      levothyroxine (SYNTHROID) 150 MCG tablet Take 150 mcg by mouth before breakfast.      multivitamin (THERAGRAN) per tablet Take 1 tablet by mouth once daily.      paroxetine (PAXIL) 10 MG tablet Take 10 mg by mouth every morning.      rosuvastatin (CRESTOR) 5 MG tablet Take 1 tablet (5 mg total) by mouth once daily. 30 tablet 2    spironolactone (ALDACTONE) 25 MG tablet Take 1 tablet (25 mg total) by mouth once daily. 30 tablet 2    tamsulosin (FLOMAX) 0.4 mg Cap Take 1 capsule (0.4 mg total) by mouth every evening. 30 capsule 2    valsartan (DIOVAN) 320 MG tablet Take 1 tablet (320 mg total) by mouth once daily. 30 tablet 2    ferrous sulfate (HIGH POTENCY IRON) 27 mg iron Tab Take 1 tablet by mouth once daily.

## 2025-05-26 NOTE — PROGRESS NOTES
Subjective:       Patient ID: Deep Farah is a 74 y.o. male.    Chief Complaint: No chief complaint on file.      Deep Farah is a 74 y.o. male with PMHx significant for:    Hx of radiofrequency cardiac ablation  Paroxysmal atrial fibrillation  Chronic systolic congestive heart failure, class 2  HTN  HLD  CKD stage 3a  Chronic anticoagulation  Normocytic anemia  Hypothyroidism  Hyperglycemia    He is here today for a patient optimization in preparation for a Right total knee arthroplasty to be performed by Dr. Walker on 6-4-2025.  Deep Farah has a significant history of Right knee pain.  Pain is worse with activity and weight bearing. Patient has experienced interference of ADLs due to increased pain and decreased range of motion. Patient has failed non-operative treatment including NSAIDs, activity modification, and corticosteroid injections. Deep Farah ambulates with a cane for balance.  There has been no significant change in medical status since last visit.    Past Medical History:   Diagnosis Date    Bell's palsy     Diverticulitis     Hypertension      Past Surgical History:   Procedure Laterality Date    CARDIAC DEFIBRILLATOR PLACEMENT      PROSTATE SURGERY       Family History   Problem Relation Name Age of Onset    Stroke Father      Hypertension Father      Hypertension Brother      Prostate cancer Brother      Prostate cancer Cousin       Social History     Socioeconomic History    Marital status:    Tobacco Use    Smoking status: Former    Smokeless tobacco: Never   Substance and Sexual Activity    Alcohol use: Yes     Comment: 2 beers in a week    Drug use: No     Social Drivers of Health     Financial Resource Strain: Low Risk  (6/21/2024)    Overall Financial Resource Strain (CARDIA)     Difficulty of Paying Living Expenses: Not hard at all   Food Insecurity: Unknown (6/21/2024)    Hunger Vital Sign     Worried About Running Out of Food in the Last Year: Never true    Transportation Needs: No Transportation Needs (6/21/2024)    TRANSPORTATION NEEDS     Transportation : No   Physical Activity: Sufficiently Active (6/21/2024)    Exercise Vital Sign     Days of Exercise per Week: 3 days     Minutes of Exercise per Session: 50 min   Stress: Stress Concern Present (6/21/2024)    [employer] [place] [employer] - Occupational Stress Questionnaire     Feeling of Stress : Rather much   Housing Stability: Unknown (6/21/2024)    Housing Stability Vital Sign     Unable to Pay for Housing in the Last Year: No     Homeless in the Last Year: No       Current Medications[1]  Review of patient's allergies indicates:   Allergen Reactions    Pcn [penicillins] Swelling       Review of Systems   Constitutional:  Negative for chills and fever.   Respiratory:  Negative for shortness of breath.    Cardiovascular:  Negative for chest pain and leg swelling.   Gastrointestinal:  Negative for nausea and vomiting.   Genitourinary:  Negative for dysuria.   Musculoskeletal:  Positive for joint pain and myalgias. Negative for falls.   Skin:  Negative for rash.   Neurological:  Negative for dizziness and sensory change.       Objective:      There were no vitals filed for this visit.  Physical Exam  Right knee     The patient is not in acute distress.   Sclerae normal  Body habitus is normal.  Respiratory distress:  none   The patient walks with a limp.  Hip irritability  negative.   The skin over the knee is intact.  Knee effusion 2+  Palpation- nontender  Range of motion-   deg,   Ligament laxity exam:  2+ valgus laxity  Popliteal cyst negative  Patellar crepitation absent.  Flexion/pinch negative  Pulses DP present, PT present.  Motor normal 5/5 strength in all tested muscle groups.   Sensory normal.    Imaging:   Dr. Walker previous imaging read: Right knee radiographs show tricompartmental loss of joint space, Kellgren stage IV     Assessment:       1. Preop examination    2. Primary  osteoarthritis of right knee        Plan:   During today's Patient Optimization Visit all consultant notes, medications, imaging, EKG, and lab work has been reviewed. Discharge planning was discussed and Home Health has been ordered. Any additional testing or consults needed for medical clearance has been ordered. Pre, kleber, and post operative procedures and expectations discussed. All questions were answered.   The patient will contact us if there are any questions, concerns, or changes in medical status prior to surgery.    30 minutes were spent on this encounter. This includes face to face time and non-face to face time preparing to see the patient (eg, review of tests), obtaining and/or reviewing separately obtained history, documenting clinical information in the electronic or other health record, independently interpreting results and communicating results to the patient/family/caregiver, or care coordinator.     Walker Justification: The mobility limitation cannot be sufficiently resolved by the use of a cane.   Patient's functional mobility deficit can be sufficiently resolved with the use of a (Rolling Walker or Walker).  Patient's mobility limitation significantly impairs their ability to participate in one of more activities of daily living.  The use of a (RW or Walker) will significantly improve the patient's ability to participate in MRADLS and the patient will use it on regular basis in the home.     No orders of the defined types were placed in this encounter.      Joint camp scheduled for tomorrow    CMP, CBC and Hgb A1C pending    Cardiology clearance pending. Pt plans on seeing his Cardiologist later this week. Appreciate Eliquis holding instructions.         [1]   Current Outpatient Medications   Medication Sig Dispense Refill    allopurinoL (ZYLOPRIM) 100 MG tablet Take 100 mg by mouth once daily.      bumetanide (BUMEX) 1 MG tablet Take 1 tablet (1 mg total) by mouth daily as needed (For weight  gain >2 lbs in 24 hours or 5 lbs over 3 days.). 30 tablet 11    carvediloL (COREG) 6.25 MG tablet Take 6.25 mg by mouth 2 (two) times daily.      ELIQUIS 5 mg Tab Take 5 mg by mouth 2 (two) times daily.      ferrous sulfate (HIGH POTENCY IRON) 27 mg iron Tab Take 1 tablet by mouth once daily.      levothyroxine (SYNTHROID) 150 MCG tablet Take 150 mcg by mouth before breakfast.      multivitamin (THERAGRAN) per tablet Take 1 tablet by mouth once daily.      paroxetine (PAXIL) 10 MG tablet Take 10 mg by mouth every morning.      rosuvastatin (CRESTOR) 5 MG tablet Take 1 tablet (5 mg total) by mouth once daily. 30 tablet 2    spironolactone (ALDACTONE) 25 MG tablet Take 1 tablet (25 mg total) by mouth once daily. 30 tablet 2    tamsulosin (FLOMAX) 0.4 mg Cap Take 1 capsule (0.4 mg total) by mouth every evening. 30 capsule 2    valsartan (DIOVAN) 320 MG tablet Take 1 tablet (320 mg total) by mouth once daily. 30 tablet 2     No current facility-administered medications for this visit.

## 2025-05-27 ENCOUNTER — OFFICE VISIT (OUTPATIENT)
Dept: ORTHOPEDICS | Facility: CLINIC | Age: 75
End: 2025-05-27
Payer: MEDICARE

## 2025-05-27 ENCOUNTER — LAB VISIT (OUTPATIENT)
Dept: LAB | Facility: HOSPITAL | Age: 75
End: 2025-05-27
Payer: MEDICARE

## 2025-05-27 VITALS — WEIGHT: 255.75 LBS | BODY MASS INDEX: 35.67 KG/M2

## 2025-05-27 DIAGNOSIS — Z01.818 PREOP EXAMINATION: Primary | ICD-10-CM

## 2025-05-27 DIAGNOSIS — Z01.818 PREOP EXAMINATION: ICD-10-CM

## 2025-05-27 DIAGNOSIS — M17.11 PRIMARY OSTEOARTHRITIS OF RIGHT KNEE: ICD-10-CM

## 2025-05-27 DIAGNOSIS — R73.9 HYPERGLYCEMIA: ICD-10-CM

## 2025-05-27 LAB
ABSOLUTE EOSINOPHIL (OHS): 0.26 K/UL
ABSOLUTE MONOCYTE (OHS): 0.67 K/UL (ref 0.3–1)
ABSOLUTE NEUTROPHIL COUNT (OHS): 4.48 K/UL (ref 1.8–7.7)
ALBUMIN SERPL BCP-MCNC: 3.9 G/DL (ref 3.5–5.2)
ALP SERPL-CCNC: 58 UNIT/L (ref 40–150)
ALT SERPL W/O P-5'-P-CCNC: 29 UNIT/L (ref 10–44)
ANION GAP (OHS): 6 MMOL/L (ref 8–16)
AST SERPL-CCNC: 40 UNIT/L (ref 11–45)
BASOPHILS # BLD AUTO: 0.06 K/UL
BASOPHILS NFR BLD AUTO: 1 %
BILIRUB SERPL-MCNC: 0.7 MG/DL (ref 0.1–1)
BUN SERPL-MCNC: 32 MG/DL (ref 8–23)
CALCIUM SERPL-MCNC: 9.1 MG/DL (ref 8.7–10.5)
CHLORIDE SERPL-SCNC: 111 MMOL/L (ref 95–110)
CO2 SERPL-SCNC: 25 MMOL/L (ref 23–29)
CREAT SERPL-MCNC: 1.8 MG/DL (ref 0.5–1.4)
EAG (OHS): 111 MG/DL (ref 68–131)
ERYTHROCYTE [DISTWIDTH] IN BLOOD BY AUTOMATED COUNT: 13.1 % (ref 11.5–14.5)
GFR SERPLBLD CREATININE-BSD FMLA CKD-EPI: 39 ML/MIN/1.73/M2
GLUCOSE SERPL-MCNC: 91 MG/DL (ref 70–110)
HBA1C MFR BLD: 5.5 % (ref 4–5.6)
HCT VFR BLD AUTO: 38.3 % (ref 40–54)
HGB BLD-MCNC: 12.2 GM/DL (ref 14–18)
IMM GRANULOCYTES # BLD AUTO: 0.02 K/UL (ref 0–0.04)
IMM GRANULOCYTES NFR BLD AUTO: 0.3 % (ref 0–0.5)
LYMPHOCYTES # BLD AUTO: 0.81 K/UL (ref 1–4.8)
MCH RBC QN AUTO: 27.7 PG (ref 27–31)
MCHC RBC AUTO-ENTMCNC: 31.9 G/DL (ref 32–36)
MCV RBC AUTO: 87 FL (ref 82–98)
NUCLEATED RBC (/100WBC) (OHS): 0 /100 WBC
PLATELET # BLD AUTO: 168 K/UL (ref 150–450)
PMV BLD AUTO: 12.4 FL (ref 9.2–12.9)
POTASSIUM SERPL-SCNC: 4.1 MMOL/L (ref 3.5–5.1)
PROT SERPL-MCNC: 7.9 GM/DL (ref 6–8.4)
RBC # BLD AUTO: 4.41 M/UL (ref 4.6–6.2)
RELATIVE EOSINOPHIL (OHS): 4.1 %
RELATIVE LYMPHOCYTE (OHS): 12.9 % (ref 18–48)
RELATIVE MONOCYTE (OHS): 10.6 % (ref 4–15)
RELATIVE NEUTROPHIL (OHS): 71.1 % (ref 38–73)
SODIUM SERPL-SCNC: 142 MMOL/L (ref 136–145)
WBC # BLD AUTO: 6.3 K/UL (ref 3.9–12.7)

## 2025-05-27 PROCEDURE — 99213 OFFICE O/P EST LOW 20 MIN: CPT | Mod: S$GLB,,, | Performed by: PHYSICIAN ASSISTANT

## 2025-05-27 PROCEDURE — 1125F AMNT PAIN NOTED PAIN PRSNT: CPT | Mod: CPTII,S$GLB,, | Performed by: PHYSICIAN ASSISTANT

## 2025-05-27 PROCEDURE — 82374 ASSAY BLOOD CARBON DIOXIDE: CPT

## 2025-05-27 PROCEDURE — 36415 COLL VENOUS BLD VENIPUNCTURE: CPT

## 2025-05-27 PROCEDURE — 83036 HEMOGLOBIN GLYCOSYLATED A1C: CPT

## 2025-05-27 PROCEDURE — 85025 COMPLETE CBC W/AUTO DIFF WBC: CPT

## 2025-05-27 PROCEDURE — 1101F PT FALLS ASSESS-DOCD LE1/YR: CPT | Mod: CPTII,S$GLB,, | Performed by: PHYSICIAN ASSISTANT

## 2025-05-27 PROCEDURE — 1159F MED LIST DOCD IN RCRD: CPT | Mod: CPTII,S$GLB,, | Performed by: PHYSICIAN ASSISTANT

## 2025-05-27 PROCEDURE — 3288F FALL RISK ASSESSMENT DOCD: CPT | Mod: CPTII,S$GLB,, | Performed by: PHYSICIAN ASSISTANT

## 2025-05-27 PROCEDURE — 1160F RVW MEDS BY RX/DR IN RCRD: CPT | Mod: CPTII,S$GLB,, | Performed by: PHYSICIAN ASSISTANT

## 2025-05-27 PROCEDURE — 99999 PR PBB SHADOW E&M-EST. PATIENT-LVL III: CPT | Mod: PBBFAC,,, | Performed by: PHYSICIAN ASSISTANT

## 2025-06-02 ENCOUNTER — TELEPHONE (OUTPATIENT)
Dept: PREADMISSION TESTING | Facility: HOSPITAL | Age: 75
End: 2025-06-02
Payer: MEDICARE

## 2025-06-04 ENCOUNTER — ANESTHESIA (OUTPATIENT)
Dept: SURGERY | Facility: HOSPITAL | Age: 75
End: 2025-06-04
Payer: MEDICARE

## 2025-06-04 ENCOUNTER — HOSPITAL ENCOUNTER (OUTPATIENT)
Facility: HOSPITAL | Age: 75
Discharge: HOME HEALTH | End: 2025-06-05
Attending: ORTHOPAEDIC SURGERY | Admitting: ORTHOPAEDIC SURGERY
Payer: MEDICARE

## 2025-06-04 DIAGNOSIS — M17.11 PRIMARY OSTEOARTHRITIS OF RIGHT KNEE: Primary | ICD-10-CM

## 2025-06-04 DIAGNOSIS — M17.11 PRIMARY OSTEOARTHRITIS OF RIGHT KNEE: ICD-10-CM

## 2025-06-04 DIAGNOSIS — Z96.651 HISTORY OF TOTAL RIGHT KNEE REPLACEMENT: ICD-10-CM

## 2025-06-04 DIAGNOSIS — G89.18 POSTOPERATIVE PAIN: ICD-10-CM

## 2025-06-04 DIAGNOSIS — Z01.818 PRE-OP TESTING: Primary | ICD-10-CM

## 2025-06-04 DIAGNOSIS — Z96.651 S/P TOTAL KNEE ARTHROPLASTY, RIGHT: ICD-10-CM

## 2025-06-04 DIAGNOSIS — I10 HYPERTENSION, UNSPECIFIED TYPE: ICD-10-CM

## 2025-06-04 PROCEDURE — C1776 JOINT DEVICE (IMPLANTABLE): HCPCS | Performed by: ORTHOPAEDIC SURGERY

## 2025-06-04 PROCEDURE — 99900035 HC TECH TIME PER 15 MIN (STAT)

## 2025-06-04 PROCEDURE — 63600175 PHARM REV CODE 636 W HCPCS: Performed by: UROLOGY

## 2025-06-04 PROCEDURE — 37000008 HC ANESTHESIA 1ST 15 MINUTES: Performed by: ORTHOPAEDIC SURGERY

## 2025-06-04 PROCEDURE — 64999 UNLISTED PX NERVOUS SYSTEM: CPT

## 2025-06-04 PROCEDURE — 25000003 PHARM REV CODE 250: Performed by: ORTHOPAEDIC SURGERY

## 2025-06-04 PROCEDURE — 94761 N-INVAS EAR/PLS OXIMETRY MLT: CPT

## 2025-06-04 PROCEDURE — 97530 THERAPEUTIC ACTIVITIES: CPT

## 2025-06-04 PROCEDURE — 27447 TOTAL KNEE ARTHROPLASTY: CPT | Mod: RT,,, | Performed by: ORTHOPAEDIC SURGERY

## 2025-06-04 PROCEDURE — 36000710: Performed by: ORTHOPAEDIC SURGERY

## 2025-06-04 PROCEDURE — 25000003 PHARM REV CODE 250

## 2025-06-04 PROCEDURE — 71000039 HC RECOVERY, EACH ADD'L HOUR: Performed by: ORTHOPAEDIC SURGERY

## 2025-06-04 PROCEDURE — 27447 TOTAL KNEE ARTHROPLASTY: CPT | Mod: AS,RT,,

## 2025-06-04 PROCEDURE — 97165 OT EVAL LOW COMPLEX 30 MIN: CPT

## 2025-06-04 PROCEDURE — 71000033 HC RECOVERY, INTIAL HOUR: Performed by: ORTHOPAEDIC SURGERY

## 2025-06-04 PROCEDURE — 36000711: Performed by: ORTHOPAEDIC SURGERY

## 2025-06-04 PROCEDURE — 27201423 OPTIME MED/SURG SUP & DEVICES STERILE SUPPLY: Performed by: ORTHOPAEDIC SURGERY

## 2025-06-04 PROCEDURE — C1713 ANCHOR/SCREW BN/BN,TIS/BN: HCPCS | Performed by: ORTHOPAEDIC SURGERY

## 2025-06-04 PROCEDURE — 63600175 PHARM REV CODE 636 W HCPCS: Performed by: ORTHOPAEDIC SURGERY

## 2025-06-04 PROCEDURE — 97162 PT EVAL MOD COMPLEX 30 MIN: CPT

## 2025-06-04 PROCEDURE — 37000009 HC ANESTHESIA EA ADD 15 MINS: Performed by: ORTHOPAEDIC SURGERY

## 2025-06-04 PROCEDURE — 63600175 PHARM REV CODE 636 W HCPCS

## 2025-06-04 DEVICE — COMP FEM POS ATTUNE SZ7 R: Type: IMPLANTABLE DEVICE | Site: KNEE | Status: FUNCTIONAL

## 2025-06-04 DEVICE — CEMENT BONE SMPLX HV GENTMYCN: Type: IMPLANTABLE DEVICE | Site: KNEE | Status: FUNCTIONAL

## 2025-06-04 DEVICE — IMPLANTABLE DEVICE: Type: IMPLANTABLE DEVICE | Site: KNEE | Status: FUNCTIONAL

## 2025-06-04 DEVICE — SYS KNEE EPAK PIN ATTUNE: Type: IMPLANTABLE DEVICE | Site: KNEE | Status: FUNCTIONAL

## 2025-06-04 RX ORDER — TALC
6 POWDER (GRAM) TOPICAL NIGHTLY PRN
Status: DISCONTINUED | OUTPATIENT
Start: 2025-06-04 | End: 2025-06-05 | Stop reason: HOSPADM

## 2025-06-04 RX ORDER — CARVEDILOL 6.25 MG/1
6.25 TABLET ORAL 2 TIMES DAILY
Status: DISCONTINUED | OUTPATIENT
Start: 2025-06-04 | End: 2025-06-05 | Stop reason: HOSPADM

## 2025-06-04 RX ORDER — FENTANYL CITRATE 50 UG/ML
INJECTION, SOLUTION INTRAMUSCULAR; INTRAVENOUS
Status: DISCONTINUED | OUTPATIENT
Start: 2025-06-04 | End: 2025-06-04

## 2025-06-04 RX ORDER — TAMSULOSIN HYDROCHLORIDE 0.4 MG/1
0.4 CAPSULE ORAL NIGHTLY
Status: DISCONTINUED | OUTPATIENT
Start: 2025-06-04 | End: 2025-06-05 | Stop reason: HOSPADM

## 2025-06-04 RX ORDER — ACETAMINOPHEN 500 MG
1000 TABLET ORAL
Status: COMPLETED | OUTPATIENT
Start: 2025-06-04 | End: 2025-06-04

## 2025-06-04 RX ORDER — SODIUM CHLORIDE 0.9 % (FLUSH) 0.9 %
5 SYRINGE (ML) INJECTION
Status: DISCONTINUED | OUTPATIENT
Start: 2025-06-04 | End: 2025-06-05 | Stop reason: HOSPADM

## 2025-06-04 RX ORDER — ONDANSETRON 4 MG/1
4 TABLET, ORALLY DISINTEGRATING ORAL EVERY 6 HOURS PRN
Status: DISCONTINUED | OUTPATIENT
Start: 2025-06-04 | End: 2025-06-05 | Stop reason: HOSPADM

## 2025-06-04 RX ORDER — SODIUM CHLORIDE 0.9 % (FLUSH) 0.9 %
10 SYRINGE (ML) INJECTION
Status: DISCONTINUED | OUTPATIENT
Start: 2025-06-04 | End: 2025-06-04 | Stop reason: HOSPADM

## 2025-06-04 RX ORDER — HYDROMORPHONE HYDROCHLORIDE 2 MG/ML
0.5 INJECTION, SOLUTION INTRAMUSCULAR; INTRAVENOUS; SUBCUTANEOUS EVERY 6 HOURS PRN
Status: DISCONTINUED | OUTPATIENT
Start: 2025-06-04 | End: 2025-06-05 | Stop reason: HOSPADM

## 2025-06-04 RX ORDER — OXYCODONE HYDROCHLORIDE 10 MG/1
10 TABLET ORAL
Status: DISCONTINUED | OUTPATIENT
Start: 2025-06-04 | End: 2025-06-05 | Stop reason: HOSPADM

## 2025-06-04 RX ORDER — OXYCODONE HYDROCHLORIDE 5 MG/1
5 TABLET ORAL
Status: DISCONTINUED | OUTPATIENT
Start: 2025-06-04 | End: 2025-06-05 | Stop reason: HOSPADM

## 2025-06-04 RX ORDER — TRANEXAMIC ACID 1 G/10ML
INJECTION, SOLUTION INTRAVENOUS
Status: DISCONTINUED | OUTPATIENT
Start: 2025-06-04 | End: 2025-06-04 | Stop reason: HOSPADM

## 2025-06-04 RX ORDER — ACETAMINOPHEN 500 MG
1000 TABLET ORAL 3 TIMES DAILY
Status: DISCONTINUED | OUTPATIENT
Start: 2025-06-04 | End: 2025-06-05 | Stop reason: HOSPADM

## 2025-06-04 RX ORDER — CEFAZOLIN 2 G/1
INJECTION, POWDER, FOR SOLUTION INTRAMUSCULAR; INTRAVENOUS
Status: DISCONTINUED | OUTPATIENT
Start: 2025-06-04 | End: 2025-06-04

## 2025-06-04 RX ORDER — HYDROMORPHONE HYDROCHLORIDE 2 MG/ML
0.2 INJECTION, SOLUTION INTRAMUSCULAR; INTRAVENOUS; SUBCUTANEOUS EVERY 5 MIN PRN
Status: DISCONTINUED | OUTPATIENT
Start: 2025-06-04 | End: 2025-06-04 | Stop reason: HOSPADM

## 2025-06-04 RX ORDER — PAROXETINE 10 MG/1
10 TABLET, FILM COATED ORAL EVERY MORNING
Status: DISCONTINUED | OUTPATIENT
Start: 2025-06-05 | End: 2025-06-05 | Stop reason: HOSPADM

## 2025-06-04 RX ORDER — TRANEXAMIC ACID 1 G/10ML
INJECTION, SOLUTION INTRAVENOUS
Status: DISCONTINUED | OUTPATIENT
Start: 2025-06-04 | End: 2025-06-04

## 2025-06-04 RX ORDER — OXYCODONE HYDROCHLORIDE 10 MG/1
10 TABLET ORAL EVERY 4 HOURS PRN
Status: DISCONTINUED | OUTPATIENT
Start: 2025-06-04 | End: 2025-06-04 | Stop reason: HOSPADM

## 2025-06-04 RX ORDER — LIDOCAINE HYDROCHLORIDE AND EPINEPHRINE 15; 5 MG/ML; UG/ML
INJECTION, SOLUTION EPIDURAL
Status: DISCONTINUED | OUTPATIENT
Start: 2025-06-04 | End: 2025-06-04

## 2025-06-04 RX ORDER — KETAMINE HCL IN 0.9 % NACL 50 MG/5 ML
SYRINGE (ML) INTRAVENOUS
Status: DISCONTINUED | OUTPATIENT
Start: 2025-06-04 | End: 2025-06-04

## 2025-06-04 RX ORDER — ALLOPURINOL 100 MG/1
100 TABLET ORAL DAILY
Status: DISCONTINUED | OUTPATIENT
Start: 2025-06-04 | End: 2025-06-05 | Stop reason: HOSPADM

## 2025-06-04 RX ORDER — HALOPERIDOL LACTATE 5 MG/ML
0.5 INJECTION, SOLUTION INTRAMUSCULAR EVERY 10 MIN PRN
Status: DISCONTINUED | OUTPATIENT
Start: 2025-06-04 | End: 2025-06-04 | Stop reason: HOSPADM

## 2025-06-04 RX ORDER — SODIUM CHLORIDE 9 MG/ML
INJECTION, SOLUTION INTRAVENOUS CONTINUOUS
Status: DISCONTINUED | OUTPATIENT
Start: 2025-06-04 | End: 2025-06-05

## 2025-06-04 RX ORDER — AMOXICILLIN 250 MG
1 CAPSULE ORAL 2 TIMES DAILY
Status: DISCONTINUED | OUTPATIENT
Start: 2025-06-04 | End: 2025-06-05 | Stop reason: HOSPADM

## 2025-06-04 RX ORDER — BUPIVACAINE HYDROCHLORIDE 2.5 MG/ML
INJECTION, SOLUTION EPIDURAL; INFILTRATION; INTRACAUDAL; PERINEURAL
Status: COMPLETED | OUTPATIENT
Start: 2025-06-04 | End: 2025-06-04

## 2025-06-04 RX ORDER — BISACODYL 10 MG/1
10 SUPPOSITORY RECTAL DAILY PRN
Status: DISCONTINUED | OUTPATIENT
Start: 2025-06-04 | End: 2025-06-05 | Stop reason: HOSPADM

## 2025-06-04 RX ORDER — SPIRONOLACTONE 25 MG/1
25 TABLET ORAL DAILY
Status: DISCONTINUED | OUTPATIENT
Start: 2025-06-04 | End: 2025-06-05 | Stop reason: HOSPADM

## 2025-06-04 RX ORDER — NAPROXEN 500 MG/1
500 TABLET ORAL
Status: COMPLETED | OUTPATIENT
Start: 2025-06-04 | End: 2025-06-04

## 2025-06-04 RX ORDER — OXYCODONE AND ACETAMINOPHEN 5; 325 MG/1; MG/1
1 TABLET ORAL EVERY 6 HOURS PRN
Qty: 45 TABLET | Refills: 0 | Status: SHIPPED | OUTPATIENT
Start: 2025-06-04

## 2025-06-04 RX ORDER — MUPIROCIN 20 MG/G
OINTMENT TOPICAL
Status: DISCONTINUED | OUTPATIENT
Start: 2025-06-04 | End: 2025-06-04 | Stop reason: HOSPADM

## 2025-06-04 RX ORDER — PROPOFOL 10 MG/ML
VIAL (ML) INTRAVENOUS CONTINUOUS PRN
Status: DISCONTINUED | OUTPATIENT
Start: 2025-06-04 | End: 2025-06-04

## 2025-06-04 RX ORDER — VALSARTAN 160 MG/1
320 TABLET ORAL DAILY
Status: DISCONTINUED | OUTPATIENT
Start: 2025-06-05 | End: 2025-06-05 | Stop reason: HOSPADM

## 2025-06-04 RX ORDER — POLYETHYLENE GLYCOL 3350 17 G/17G
17 POWDER, FOR SOLUTION ORAL DAILY
Status: DISCONTINUED | OUTPATIENT
Start: 2025-06-04 | End: 2025-06-05 | Stop reason: HOSPADM

## 2025-06-04 RX ORDER — TRANEXAMIC ACID 10 MG/ML
1000 INJECTION, SOLUTION INTRAVENOUS
Status: DISCONTINUED | OUTPATIENT
Start: 2025-06-04 | End: 2025-06-04 | Stop reason: HOSPADM

## 2025-06-04 RX ORDER — CEFAZOLIN 2 G/1
2 INJECTION, POWDER, FOR SOLUTION INTRAMUSCULAR; INTRAVENOUS
Status: COMPLETED | OUTPATIENT
Start: 2025-06-04 | End: 2025-06-05

## 2025-06-04 RX ORDER — LIDOCAINE HYDROCHLORIDE 10 MG/ML
1 INJECTION, SOLUTION EPIDURAL; INFILTRATION; INTRACAUDAL; PERINEURAL ONCE
Status: DISCONTINUED | OUTPATIENT
Start: 2025-06-04 | End: 2025-06-04 | Stop reason: HOSPADM

## 2025-06-04 RX ORDER — SODIUM CHLORIDE 0.9 % (FLUSH) 0.9 %
3 SYRINGE (ML) INJECTION EVERY 8 HOURS
Status: DISCONTINUED | OUTPATIENT
Start: 2025-06-04 | End: 2025-06-04 | Stop reason: HOSPADM

## 2025-06-04 RX ORDER — GLUCAGON 1 MG
1 KIT INJECTION
Status: DISCONTINUED | OUTPATIENT
Start: 2025-06-04 | End: 2025-06-04 | Stop reason: HOSPADM

## 2025-06-04 RX ORDER — OXYCODONE HYDROCHLORIDE 5 MG/1
5 TABLET ORAL
Status: DISCONTINUED | OUTPATIENT
Start: 2025-06-04 | End: 2025-06-04 | Stop reason: HOSPADM

## 2025-06-04 RX ORDER — FAMOTIDINE 20 MG/1
20 TABLET, FILM COATED ORAL DAILY
Status: DISCONTINUED | OUTPATIENT
Start: 2025-06-04 | End: 2025-06-05 | Stop reason: HOSPADM

## 2025-06-04 RX ORDER — ONDANSETRON HYDROCHLORIDE 2 MG/ML
4 INJECTION, SOLUTION INTRAVENOUS EVERY 6 HOURS PRN
Status: DISCONTINUED | OUTPATIENT
Start: 2025-06-04 | End: 2025-06-05 | Stop reason: HOSPADM

## 2025-06-04 RX ORDER — CEFAZOLIN 2 G/1
2 INJECTION, POWDER, FOR SOLUTION INTRAMUSCULAR; INTRAVENOUS
Status: DISCONTINUED | OUTPATIENT
Start: 2025-06-04 | End: 2025-06-04 | Stop reason: HOSPADM

## 2025-06-04 RX ADMIN — CEFAZOLIN 2 G: 2 INJECTION, POWDER, FOR SOLUTION INTRAMUSCULAR; INTRAVENOUS at 06:06

## 2025-06-04 RX ADMIN — SODIUM CHLORIDE: 9 INJECTION, SOLUTION INTRAVENOUS at 04:06

## 2025-06-04 RX ADMIN — Medication 20 MG: at 10:06

## 2025-06-04 RX ADMIN — Medication 10 MG: at 12:06

## 2025-06-04 RX ADMIN — ACETAMINOPHEN 1000 MG: 500 TABLET ORAL at 08:06

## 2025-06-04 RX ADMIN — CEFAZOLIN 2 G: 2 INJECTION, POWDER, FOR SOLUTION INTRAMUSCULAR; INTRAVENOUS at 10:06

## 2025-06-04 RX ADMIN — NOREPINEPHRINE BITARTRATE 0.02 MCG/KG/MIN: 1 INJECTION, SOLUTION, CONCENTRATE INTRAVENOUS at 10:06

## 2025-06-04 RX ADMIN — ACETAMINOPHEN 1000 MG: 500 TABLET ORAL at 07:06

## 2025-06-04 RX ADMIN — ACETAMINOPHEN 1000 MG: 500 TABLET ORAL at 04:06

## 2025-06-04 RX ADMIN — TRANEXAMIC ACID 1000 MG: 100 INJECTION, SOLUTION INTRAVENOUS at 10:06

## 2025-06-04 RX ADMIN — MUPIROCIN: 20 OINTMENT TOPICAL at 07:06

## 2025-06-04 RX ADMIN — TAMSULOSIN HYDROCHLORIDE 0.4 MG: 0.4 CAPSULE ORAL at 08:06

## 2025-06-04 RX ADMIN — ALLOPURINOL 100 MG: 100 TABLET ORAL at 04:06

## 2025-06-04 RX ADMIN — CARVEDILOL 6.25 MG: 6.25 TABLET, FILM COATED ORAL at 08:06

## 2025-06-04 RX ADMIN — SPIRONOLACTONE 25 MG: 25 TABLET, FILM COATED ORAL at 04:06

## 2025-06-04 RX ADMIN — BUPIVACAINE HYDROCHLORIDE 20 ML: 2.5 INJECTION, SOLUTION EPIDURAL; INFILTRATION; INTRACAUDAL; PERINEURAL at 01:06

## 2025-06-04 RX ADMIN — LIDOCAINE HYDROCHLORIDE,EPINEPHRINE BITARTRATE 3 MG: 15; .005 INJECTION, SOLUTION EPIDURAL; INFILTRATION; INTRACAUDAL; PERINEURAL at 10:06

## 2025-06-04 RX ADMIN — NAPROXEN 500 MG: 500 TABLET ORAL at 07:06

## 2025-06-04 RX ADMIN — MEPIVACAINE HYDROCHLORIDE 3 ML: 15 INJECTION, SOLUTION EPIDURAL; INFILTRATION at 10:06

## 2025-06-04 RX ADMIN — PROPOFOL 100 MCG/KG/MIN: 10 INJECTION, EMULSION INTRAVENOUS at 10:06

## 2025-06-04 RX ADMIN — FENTANYL CITRATE 100 MCG: 50 INJECTION INTRAMUSCULAR; INTRAVENOUS at 12:06

## 2025-06-04 RX ADMIN — FAMOTIDINE 20 MG: 20 TABLET, FILM COATED ORAL at 04:06

## 2025-06-04 NOTE — ANESTHESIA PROCEDURE NOTES
R IPACK SS    Patient location during procedure: OR   Block not for primary anesthetic.  Reason for block: at surgeon's request and post-op pain management   Post-op Pain Location: R LE pain      Staffing  Authorizing Provider: Everett Eddy MD  Performing Provider: Gale Mitchell DO    Staffing  Performed by: Gale Mitchell DO  Authorized by: Everett Eddy MD    Preanesthetic Checklist  Completed: patient identified, IV checked, site marked, risks and benefits discussed, surgical consent, monitors and equipment checked, pre-op evaluation and timeout performed  Peripheral Block  Patient position: supine  Prep: ChloraPrep  Patient monitoring: heart rate, cardiac monitor, continuous pulse ox, continuous capnometry and frequent blood pressure checks  Block type: I PACK  Laterality: right  Injection technique: single shot  Needle  Needle type: Stimuplex   Needle gauge: 21 G  Needle length: 4 in  Needle localization: anatomical landmarks and ultrasound guidance   -ultrasound image captured on disc.  Assessment  Injection assessment: negative aspiration, negative parasthesia and local visualized surrounding nerve  Paresthesia pain: none  Heart rate change: no  Slow fractionated injection: yes    Medications:    Medications: bupivacaine (pf) (MARCAINE) injection 0.25% - Perineural   20 mL - 6/4/2025 1:03:00 PM    Additional Notes  VSS.  [facility] RN monitoring vitals throughout procedure.  Patient tolerated procedure well.

## 2025-06-04 NOTE — TRANSFER OF CARE
"Anesthesia Transfer of Care Note    Patient: Deep Farah    Procedure(s) Performed: Procedure(s) (LRB):  ARTHROPLASTY, KNEE (Right)    Patient location: PACU    Anesthesia Type: spinal and MAC    Transport from OR: Transported from OR on 6-10 L/min O2 by face mask with adequate spontaneous ventilation    Post pain: adequate analgesia    Post assessment: no apparent anesthetic complications and tolerated procedure well    Post vital signs: stable    Level of consciousness: awake, alert and oriented    Nausea/Vomiting: no nausea/vomiting    Complications: none    Transfer of care protocol was followed      Last vitals: Visit Vitals  BP (!) 158/76   Pulse 73   Temp 36.7 °C (98.1 °F) (Skin)   Resp 16   Ht 5' 11" (1.803 m)   Wt 115.7 kg (255 lb)   SpO2 98%   BMI 35.57 kg/m²     "

## 2025-06-04 NOTE — ANESTHESIA PROCEDURE NOTES
CSE    Patient location during procedure: OR      Staffing  Authorizing Provider: Everett Eddy MD  Performing Provider: Gale Mitchell DO    Staffing  Performed by: Gale Mitchell DO  Authorized by: Everett Eddy MD    Preanesthetic Checklist  Completed: patient identified, IV checked, site marked, risks and benefits discussed, surgical consent, monitors and equipment checked, pre-op evaluation and timeout performed  CSE  Patient position: sitting  Prep: ChloraPrep  Patient monitoring: frequent blood pressure checks and continuous pulse ox  Approach: midline  Spinal Needle  Needle type: pencil-tip   Needle gauge: 22 G  Needle length: 3.5 in  Epidural Needle  Injection technique: MARK air  Needle type: Tuohy   Needle gauge: 17 G  Needle length: 3.5 in  Needle insertion depth: 7.5 cm  Location: L3-4  Needle localization: anatomical landmarks   Catheter  Catheter type: Local Funeral  Catheter size: 19 G  Catheter at skin depth: 12 cm  Test dose: lidocaine 1.5% with Epi 1-to-200,000  Test dose: 3 mL  Additional Documentation: incremental injection, negative aspiration for CSF, no paresthesia on injection and negative aspiration for heme  Assessment  Intrathecal Medications:   administered: primary anesthetic mcg of    Additional Notes  1 cc of 1.5% ,mepivacaine given intrathecally at 10:15 AM

## 2025-06-04 NOTE — ANESTHESIA PROCEDURE NOTES
R Adductor SS    Patient location during procedure: OR   Block not for primary anesthetic.  Reason for block: at surgeon's request and post-op pain management   Post-op Pain Location: R LE Pain      Staffing  Authorizing Provider: Everett Eddy MD  Performing Provider: Gale Mitchell DO    Staffing  Performed by: Gale Mitchell DO  Authorized by: Everett Eddy MD    Preanesthetic Checklist  Completed: patient identified, IV checked, site marked, risks and benefits discussed, surgical consent, monitors and equipment checked, pre-op evaluation and timeout performed  Peripheral Block  Patient position: supine  Prep: ChloraPrep  Patient monitoring: heart rate, cardiac monitor, continuous pulse ox, continuous capnometry and frequent blood pressure checks  Block type: adductor canal  Laterality: right  Injection technique: single shot  Needle  Needle type: Stimuplex   Needle gauge: 21 G  Needle length: 4 in  Needle localization: anatomical landmarks and ultrasound guidance   -ultrasound image captured on disc.  Assessment  Injection assessment: negative aspiration, negative parasthesia and local visualized surrounding nerve  Paresthesia pain: none  Heart rate change: no  Slow fractionated injection: yes    Medications:    Medications: bupivacaine (pf) (MARCAINE) injection 0.25% - Perineural   20 mL - 6/4/2025 1:01:00 PM    Additional Notes  VSS.  [facility] RN monitoring vitals throughout procedure.  Patient tolerated procedure well.

## 2025-06-05 VITALS
HEIGHT: 71 IN | SYSTOLIC BLOOD PRESSURE: 166 MMHG | WEIGHT: 257.25 LBS | RESPIRATION RATE: 20 BRPM | DIASTOLIC BLOOD PRESSURE: 76 MMHG | TEMPERATURE: 98 F | BODY MASS INDEX: 36.02 KG/M2 | OXYGEN SATURATION: 95 % | HEART RATE: 80 BPM

## 2025-06-05 LAB
ANION GAP (OHS): 9 MMOL/L (ref 8–16)
BUN SERPL-MCNC: 29 MG/DL (ref 8–23)
CALCIUM SERPL-MCNC: 8.3 MG/DL (ref 8.7–10.5)
CHLORIDE SERPL-SCNC: 108 MMOL/L (ref 95–110)
CO2 SERPL-SCNC: 20 MMOL/L (ref 23–29)
CREAT SERPL-MCNC: 1.5 MG/DL (ref 0.5–1.4)
GFR SERPLBLD CREATININE-BSD FMLA CKD-EPI: 48 ML/MIN/1.73/M2
GLUCOSE SERPL-MCNC: 112 MG/DL (ref 70–110)
HCT VFR BLD AUTO: 36.4 % (ref 40–54)
HGB BLD-MCNC: 11.4 GM/DL (ref 14–18)
POTASSIUM SERPL-SCNC: 4.5 MMOL/L (ref 3.5–5.1)
SODIUM SERPL-SCNC: 137 MMOL/L (ref 136–145)

## 2025-06-05 PROCEDURE — 25000003 PHARM REV CODE 250: Performed by: ORTHOPAEDIC SURGERY

## 2025-06-05 PROCEDURE — 97530 THERAPEUTIC ACTIVITIES: CPT

## 2025-06-05 PROCEDURE — 99900035 HC TECH TIME PER 15 MIN (STAT)

## 2025-06-05 PROCEDURE — 63600175 PHARM REV CODE 636 W HCPCS: Performed by: ORTHOPAEDIC SURGERY

## 2025-06-05 PROCEDURE — 94761 N-INVAS EAR/PLS OXIMETRY MLT: CPT

## 2025-06-05 PROCEDURE — 82310 ASSAY OF CALCIUM: CPT | Performed by: ORTHOPAEDIC SURGERY

## 2025-06-05 PROCEDURE — 85014 HEMATOCRIT: CPT | Performed by: ORTHOPAEDIC SURGERY

## 2025-06-05 PROCEDURE — 97116 GAIT TRAINING THERAPY: CPT

## 2025-06-05 PROCEDURE — 85018 HEMOGLOBIN: CPT | Performed by: ORTHOPAEDIC SURGERY

## 2025-06-05 PROCEDURE — 36415 COLL VENOUS BLD VENIPUNCTURE: CPT | Performed by: ORTHOPAEDIC SURGERY

## 2025-06-05 RX ADMIN — OXYCODONE HYDROCHLORIDE 10 MG: 10 TABLET ORAL at 05:06

## 2025-06-05 RX ADMIN — CEFAZOLIN 2 G: 2 INJECTION, POWDER, FOR SOLUTION INTRAMUSCULAR; INTRAVENOUS at 01:06

## 2025-06-05 RX ADMIN — ALLOPURINOL 100 MG: 100 TABLET ORAL at 08:06

## 2025-06-05 RX ADMIN — PAROXETINE 10 MG: 10 TABLET, FILM COATED ORAL at 06:06

## 2025-06-05 RX ADMIN — FAMOTIDINE 20 MG: 20 TABLET, FILM COATED ORAL at 08:06

## 2025-06-05 RX ADMIN — CARVEDILOL 6.25 MG: 6.25 TABLET, FILM COATED ORAL at 08:06

## 2025-06-05 RX ADMIN — SPIRONOLACTONE 25 MG: 25 TABLET, FILM COATED ORAL at 08:06

## 2025-06-05 RX ADMIN — SODIUM CHLORIDE: 9 INJECTION, SOLUTION INTRAVENOUS at 05:06

## 2025-06-05 RX ADMIN — LEVOTHYROXINE SODIUM 150 MCG: 25 TABLET ORAL at 05:06

## 2025-06-05 RX ADMIN — VALSARTAN 320 MG: 160 TABLET, FILM COATED ORAL at 08:06

## 2025-06-05 RX ADMIN — ACETAMINOPHEN 1000 MG: 500 TABLET ORAL at 08:06

## 2025-06-05 RX ADMIN — APIXABAN 5 MG: 5 TABLET, FILM COATED ORAL at 08:06

## 2025-06-09 NOTE — ANESTHESIA POSTPROCEDURE EVALUATION
Anesthesia Post Evaluation    Patient: Deep Farah    Procedure(s) Performed: Procedure(s) (LRB):  ARTHROPLASTY, KNEE (Right)    Final Anesthesia Type: CSE      Patient location during evaluation: PACU  Patient participation: Yes- Able to Participate  Level of consciousness: awake and alert and oriented  Post-procedure vital signs: reviewed and stable  Pain management: adequate  Airway patency: patent    PONV status at discharge: No PONV  Anesthetic complications: no      Cardiovascular status: hemodynamically stable  Respiratory status: unassisted  Hydration status: euvolemic  Follow-up not needed.              Vitals Value Taken Time   /76 06/05/25 08:30   Temp 36.6 °C (97.8 °F) 06/05/25 08:30   Pulse 80 06/05/25 13:42   Resp 20 06/05/25 13:42   SpO2 95 % 06/05/25 13:42         Event Time   Out of Recovery 15:36:10         Pain/Alyce Score: No data recorded

## 2025-06-20 ENCOUNTER — HOSPITAL ENCOUNTER (OUTPATIENT)
Facility: HOSPITAL | Age: 75
Discharge: HOME | End: 2025-06-20
Attending: ORTHOPAEDIC SURGERY
Payer: MEDICARE

## 2025-06-20 ENCOUNTER — OFFICE VISIT (OUTPATIENT)
Dept: ORTHOPEDICS | Facility: CLINIC | Age: 75
End: 2025-06-20
Payer: MEDICARE

## 2025-06-20 VITALS — BODY MASS INDEX: 35.88 KG/M2 | HEIGHT: 71 IN

## 2025-06-20 DIAGNOSIS — Z96.651 S/P TOTAL KNEE ARTHROPLASTY, RIGHT: ICD-10-CM

## 2025-06-20 DIAGNOSIS — M17.11 PRIMARY OSTEOARTHRITIS OF RIGHT KNEE: Primary | ICD-10-CM

## 2025-06-20 DIAGNOSIS — G89.18 POSTOPERATIVE PAIN: ICD-10-CM

## 2025-06-20 PROCEDURE — 73560 X-RAY EXAM OF KNEE 1 OR 2: CPT | Mod: 26,LT,, | Performed by: RADIOLOGY

## 2025-06-20 PROCEDURE — 73560 X-RAY EXAM OF KNEE 1 OR 2: CPT | Mod: TC,PN,LT

## 2025-06-20 PROCEDURE — 73562 X-RAY EXAM OF KNEE 3: CPT | Mod: 26,RT,, | Performed by: RADIOLOGY

## 2025-06-20 PROCEDURE — 99999 PR PBB SHADOW E&M-EST. PATIENT-LVL III: CPT | Mod: PBBFAC,,,

## 2025-06-20 RX ORDER — CELECOXIB 100 MG/1
100 CAPSULE ORAL 2 TIMES DAILY
Qty: 60 CAPSULE | Refills: 2 | Status: SHIPPED | OUTPATIENT
Start: 2025-06-20

## 2025-06-20 RX ORDER — OXYCODONE AND ACETAMINOPHEN 5; 325 MG/1; MG/1
1 TABLET ORAL EVERY 12 HOURS PRN
Qty: 45 TABLET | Refills: 0 | Status: SHIPPED | OUTPATIENT
Start: 2025-06-20

## 2025-06-20 NOTE — PROGRESS NOTES
"  Subjective:      Patient ID: Deep Farah is a 74 y.o. male.    Chief Complaint: Post-op Evaluation (Right TKA )      HPI:Deep Farah is here for a postop visit.   Surgery: right TKA  Date of Surgery: 6/4/2025 (2 weeks)  Surgeon: Dr. Walker    He is doing well.   Pain control has been satisfactory.  Ambulating with walker  He has resumed activities of daily living.   Post surgical complaints include: none.   No n/v, fever, chills, SOB, leg pain, surgical site irritation or drainage.    Current Medications[1]  Review of patient's allergies indicates:   Allergen Reactions    Pcn [penicillins] Swelling       Ht 5' 11" (1.803 m)   BMI 35.88 kg/m²     Review of Systems   Constitutional: Negative for chills and fever.   HENT:  Negative for congestion and sore throat.    Eyes:  Negative for discharge and double vision.   Cardiovascular:  Negative for chest pain, palpitations and syncope.   Respiratory:  Negative for cough and shortness of breath.    Endocrine: Negative for cold intolerance and heat intolerance.   Skin:  Negative for dry skin and rash.   Musculoskeletal:  Positive for joint pain, joint swelling and stiffness.   Gastrointestinal:  Negative for abdominal pain, nausea and vomiting.   Neurological:  Negative for focal weakness, numbness and paresthesias.           Objective:    Ortho Exam          Alert, oriented, no acute distress  antalgic gait  Ambulating with walker  wound margins intact and healing well.  No signs of infection.  Range of motion: 10-90  Valgus/varus stability- stable  mild    Assessment:     Imaging: I independently interpreted the patient's imaging. Xrays of the patient's R knee demonstrate well positioned TKA prosthesis        The patient is progressing as expected postoperatively.  No postop complications at this time    Patient does report that he has been keeping a pillow under his knee, per home health, which we discussed is likely the cause of the mild flexion contracture " noted on exam.  I advised the patient to put pillows under his ankle, opposed to his knee, which will help improve flexion contracture.  We also discussed importance of doing quad exercises to help with complete knee extension.  Patient expressed understanding      Plan:     Physical therapy: we discussed formal PT vs HEP.  Orders placed today for formal PT  Pain control: continue current pain regimen. Pt may continue to have intermittent swelling and pain for the 1st year post op. Symptoms will continue to improve as pt progresses postoperatively  New pain prescription sent today.  Celebrex prescription sent today.  We discussed the importance of taking OTC analgesia as needed for pain and only taking prescription pain meds for breakthrough symptoms.  Precautions advised, and Patient expressed understanding  Mobility:  We discussed weaning from using walker to cane when he feels comfortable  Postop restrictions:  Discussed today in clinic  Follow-up:  6 week postop follow-up with Dr. Walker in 4 weeks    All of the patient's questions were answered and the patient will contact us if they have any questions or concerns in the interim.      Beverly Jackson PA-C  [facility]  Orthopedic Surgery           [1]   Current Outpatient Medications:     allopurinoL (ZYLOPRIM) 100 MG tablet, Take 100 mg by mouth once daily., Disp: , Rfl:     bumetanide (BUMEX) 1 MG tablet, Take 1 tablet (1 mg total) by mouth daily as needed (For weight gain >2 lbs in 24 hours or 5 lbs over 3 days.)., Disp: 30 tablet, Rfl: 11    carvediloL (COREG) 6.25 MG tablet, Take 6.25 mg by mouth 2 (two) times daily., Disp: , Rfl:     ELIQUIS 5 mg Tab, Take 5 mg by mouth 2 (two) times daily., Disp: , Rfl:     ferrous sulfate (HIGH POTENCY IRON) 27 mg iron Tab, Take 1 tablet by mouth once daily., Disp: , Rfl:     levothyroxine (SYNTHROID) 150 MCG tablet, Take 150 mcg by mouth before breakfast., Disp: , Rfl:     multivitamin (THERAGRAN) per tablet, Take 1  tablet by mouth once daily., Disp: , Rfl:     paroxetine (PAXIL) 10 MG tablet, Take 10 mg by mouth every morning., Disp: , Rfl:     celecoxib (CELEBREX) 100 MG capsule, Take 1 capsule (100 mg total) by mouth 2 (two) times daily., Disp: 60 capsule, Rfl: 2    oxyCODONE-acetaminophen (PERCOCET) 5-325 mg per tablet, Take 1 tablet by mouth every 12 (twelve) hours as needed for Pain., Disp: 45 tablet, Rfl: 0    rosuvastatin (CRESTOR) 5 MG tablet, Take 1 tablet (5 mg total) by mouth once daily., Disp: 30 tablet, Rfl: 2    spironolactone (ALDACTONE) 25 MG tablet, Take 1 tablet (25 mg total) by mouth once daily., Disp: 30 tablet, Rfl: 2    tamsulosin (FLOMAX) 0.4 mg Cap, Take 1 capsule (0.4 mg total) by mouth every evening., Disp: 30 capsule, Rfl: 2    valsartan (DIOVAN) 320 MG tablet, Take 1 tablet (320 mg total) by mouth once daily., Disp: 30 tablet, Rfl: 2

## 2025-06-23 ENCOUNTER — CLINICAL SUPPORT (OUTPATIENT)
Dept: REHABILITATION | Facility: HOSPITAL | Age: 75
End: 2025-06-23
Payer: MEDICARE

## 2025-06-23 DIAGNOSIS — Z96.651 HISTORY OF TOTAL RIGHT KNEE REPLACEMENT: Primary | ICD-10-CM

## 2025-06-23 DIAGNOSIS — Z96.651 S/P TOTAL KNEE ARTHROPLASTY, RIGHT: ICD-10-CM

## 2025-06-23 DIAGNOSIS — G89.18 POSTOPERATIVE PAIN: ICD-10-CM

## 2025-06-23 DIAGNOSIS — M17.11 PRIMARY OSTEOARTHRITIS OF RIGHT KNEE: ICD-10-CM

## 2025-06-23 PROCEDURE — 97140 MANUAL THERAPY 1/> REGIONS: CPT | Mod: PO

## 2025-06-23 PROCEDURE — 97110 THERAPEUTIC EXERCISES: CPT | Mod: PO

## 2025-06-23 PROCEDURE — 97162 PT EVAL MOD COMPLEX 30 MIN: CPT | Mod: PO

## 2025-06-24 NOTE — PROGRESS NOTES
Outpatient Rehab      Outpatient Rehab    Physical Therapy Evaluation    Patient Name: Deep Farah  MRN: [id number]  YOB: 1950  Encounter Date: 6/23/2025    Therapy Diagnosis:   Encounter Diagnoses   Name Primary?    Primary osteoarthritis of right knee     S/P total knee arthroplasty, right     Postoperative pain     History of total right knee replacement Yes     Physician: Beverly Jackson PA-C    Physician Orders: Eval and Treat  Medical Diagnosis: Primary osteoarthritis of right knee  S/P total knee arthroplasty, right  Postoperative pain  Surgical Diagnosis: Right knee athropolasty   Surgical Date: Not applicable for this Episode  Days Since Last Surgery: Not applicable for this Episode    Visit # / Visits Authorized:  1 / 1  Insurance Authorization Period: 6/20/2025 to 6/20/2026  Date of Evaluation: 6/23/2025  Plan of Care Certification: 6/23/2025 to 9/23/2025     Time In: 1700   Time Out: 1753  Total Time (in minutes): 53   Total Billable Time (in minutes): 53    Intake Outcome Measure for FOTO Survey    Therapist reviewed FOTO scores for Deep Farah on 6/23/2025.   FOTO report - see Media section or FOTO account episode details.     Intake Score: 46 (Given this patient risk adjustment variables, and the actual intake functional score, patient will experience at least an increase in function of 30 points, putting them in the stage four level or higher at discharge. )%    Precautions:       Subjective   History of Present Illness  Deep is a 74 y.o. male who reports to physical therapy with a chief concern of Right knee pain status post right TKA period..     The patient reports a medical diagnosis of Status post right? Total knee athroplasty.  Patient reports a surgery of Right knee athropolasty.  Diagnostic tests related to this condition: X-ray.   X-Ray Details: EXAMINATION:  XR KNEE ORTHO RIGHT (XPD)     CLINICAL HISTORY:  Presence of right artificial knee joint     TECHNIQUE:  AP  weight-bearing radiograph of the right knee, lateral weight-bearing radiograph of the right knee, Merchant radiograph of the right knee, PA flexion standing radiograph of the right knee (4 images)     COMPARISON:  Radiographs 03/06/2025     FINDINGS:  Postsurgical changes status post right total knee arthroplasty.  Alignment appears anatomic.  Surgical skin staples are noted.     Impression:     Postsurgical changes status post interval right total knee arthroplasty.    History of Present Condition/Illness: Patient presents for his postoperative evaluation following a right total knee athlete performed on June 4th, 2025.. He reports that he is progressing well with pain controlled through current medications and occasional use of Percocet for breakthrough pain. he ambulates with a walker and has resumed his basic activities of daily living. no current complaints of nausea, vomiting, fever, chills, shortness of breath, or wound related issues. Patient did report that he has been using a pillow underneath the knee, contributing to observed mild affliction contracture. he has since modified this behavior per the surgeon's instructions.    Pain     Patient reports a current pain level of 5/10. Pain at best is reported as 0/10. Pain at worst is reported as 9/10.   Location: RT KNee circumference  Clinical Progression (since onset): Stable  Pain Qualities: Aching, Tenderness  Pain-Relieving Factors: Activity modification, Elevation, Ice, Lying down, Medications - over-the-counter, Medications - prescription, Movement, Rest, Other (Comment)  Other Pain-Relieving Factors: Celebrex  Pain-Aggravating Factors: Bending, Lifting, Kneeling, Twisting, Walking, Transfers, Standing, Straightening, Stair climbing, Running  Patient presents with gross edema of the right knee joint with a 30° flexion contracture.      Living Arrangements  Living Situation  Housing: Home independently  Living Arrangements: Spouse/significant  other    Home Setup  Type of Structure: House  Number of Levels in Home: One level  Existing Accessibility Options: Ramp  Primary Bedroom: 1st floor  Primary Bathroom: 1st floor  Location of Additional Bathrooms: 1st floor  Bathroom Toilet: Raised  Bathroom Shower/Tub: Tub/shower unit  Bathroom Equipment: Grab bars around toilet, Grab bars in shower, Shower chair with back  Kitchen: 1st floor  Laundry: 1st floor    Equipment/Treatments  Mobility Equipment: Rollator, Straight cane          Past Medical History/Physical Systems Review:   Deep Farah  has a past medical history of Bell's palsy, Diverticulitis, and Hypertension.    Deep Farah  has a past surgical history that includes Prostate surgery; Cardiac defibrillator placement; and Knee Arthroplasty (Right, 6/4/2025).    Deep has a current medication list which includes the following prescription(s): allopurinol, bumetanide, carvedilol, celecoxib, eliquis, high potency iron, levothyroxine, multivitamin, oxycodone-acetaminophen, paroxetine, rosuvastatin, spironolactone, tamsulosin, and valsartan.    Review of patient's allergies indicates:   Allergen Reactions    Pcn [penicillins] Swelling        Objective       Knee Range of Motion   Right Knee   Active (deg) Passive (deg) Pain   Flexion 78 80 Yes   Extension -30 -23 Yes       General Observation: Patient alert, oriented, and in no acute distress. Presents with mild to moderate swelling in the right knee, consistent with post-operative recovery phase.Wound: Surgical incision site clean, dry, and healing appropriately. Wound margins are intact; no erythema, drainage, or signs of infection noted.Gait Analysis: Ambulating without an assistive device using an antalgic gait pattern. Reduced stance time on the right side and decreased knee extension during mid-stance. Posture: Mild forward trunk lean with protective guarding during ambulation. Range of Motion (Right Knee): Flexion: 90°, Extension: Soft flexor  contracture @ -30° (noted mild flexion contracture). Passive ROM reveals mild resistance at terminal extension. Strength Testing: Quadriceps inhibition noted on manual testing, estimated at 3-/5. Hamstring strength 3/5. No lag observed with supine SLR. Joint Stability: Medial and lateral stress tests reveal stable valgus and varus integrity.Balance/Transfers: Demonstrates mild instability during sit-to-stand; requires bilateral UE support for balance. No loss of balance noted during seated to standing transitions.Special Considerations: Patient responded well to instruction regarding correction of pillow placement under ankle rather than under knee. Demonstrates emerging activation of quadriceps following verbal and tactile cueing.           Treatment:  Therapeutic Exercise  TE 1: Quadriceps sets: 10 reps  3 sets  TE 2: alphabet alphabet ankle range of motion.  TE 3: Heel slides: 10 reps  3 sets  TE 4: Seated knee extensions:1 x10, 2 times  TE 5: Hamstring stretch: 30-sec hold, 3 reps  Manual Therapy  MT 1: Patellar mobilizations to reduce stiffness  MT 2: Soft tissue massage around the quadriceps and hamstrings to alleviate tightness  MT 3: Passive stretching of hip flexors and hamstrings      Time Entry(in minutes):  PT Evaluation (Moderate) Time Entry: 30  Manual Therapy Time Entry: 15  Therapeutic Exercise Time Entry: 15    Assessment & Plan   Assessment  Deep presents with a condition of Moderate complexity.   Presentation of Symptoms: Stable       Functional Limitations: Activity tolerance, Transfers, Standing tolerance, Squatting, Sitting tolerance, Range of motion, Participating in sports, Participating in leisure activities, Painful locomotion/ambulation, Pain with ADLs/IADLs, Gait limitations, Functional mobility  Impairments: Abnormal gait, Activity intolerance, Impaired physical strength, Pain with functional activity, Weight-bearing intolerance  Personal Factors Affecting Prognosis: Fear/anxiety,  Pain    Patient Goal for Therapy (PT): Patient would like to achieve optimal joint function with a return to normal activities of daily living.  Prognosis: Good  Prognosis Details: Good prognosis for functional recovery with continued skilled therapy, progressive mobility training, and adherence to HEP.  Assessment Details: The patient is recovering appropriately 2 weeks post up. From a right total knee, aphroplasty. he demonstrates a mild flexion contracture and a quadriceps inhibition, both expected at this stage.  He is highly motivated and compliant with recommendations. no signs of complications were noted. with continued physical therapy, he is expected to meet all functional goals.    Plan  From a physical therapy perspective, the patient would benefit from: Skilled Rehab Services    Planned therapy interventions include: Therapeutic exercise, Therapeutic activities, Neuromuscular re-education, Manual therapy, ADLs/IADLs, Other (Comment), and Gait training. Plan of care to include. A treatment frequency of two to three times per week, a duration of three months from June 23rd., 2025 through September 23rd., 2025. Local areas of intervention will aim to improve range of motion, strength, gate, and independence in activities of daily living. prevents car adhesions and manage pain and or swelling. Reassess progress at 4 week intervals. Patient will also benefit from a dynasplint prescription to address his soft flexion contracture.  Planned modalities to include: Biofeedback, Electrical stimulation - attended, Electrical stimulation - passive/unattended, Thermotherapy (hot pack), and Cryotherapy (cold pack).        Visit Frequency: 3 times Per Week for 12 Weeks.       This plan was discussed with Patient.   Discussion participants: Agreed Upon Plan of Care  Plan details: Frequency and duration of treatment to be adjusted as needed.          The patient's spiritual, cultural, and educational needs were  considered, and the patient is agreeable to the plan of care and goals.     Education  Education was done with Patient. The patient's learning style includes Demonstration and Listening. The patient Demonstrates understanding and Verbalizes understanding.         Home Exercise Program (HEP) Handout for 6/23/2025: Quad sets: 3x10, 3 times daily, Ankle pumps: 30 reps hourly, while awake Heel slides: 3x10, 2 times daily, Seated knee extensions: 3x10, 2 times daily Straight leg raises (if able): 3x10, once daily Hamstring stretch: 30-sec hold, 3 reps. Home exercise program to also include frequent lower extremity Vertical elevation with ankle pumps to assist with edema control.       Goals:   Active       Long term goals over 12 weeks       Ambulate independently without assisted device greater than 500 feet.       Start:  06/23/25    Expected End:  09/23/25            Resume all basic and light recreational activities of daily living without pain.       Start:  06/23/25    Expected End:  09/23/25            Improve lower extremity strength to within functional limits       Start:  06/23/25    Expected End:  09/23/25            Return to community activities with independence.       Start:  06/23/25    Expected End:  09/23/25            .       Start:  06/23/25    Expected End:  09/23/25               short term goals over 6 weeks       Improve ment Knee Range of motion. zero to 110 degrees        Start:  06/23/25    Expected End:  09/23/25            Increase right quadriceps strength to 4 / 5.        Start:  06/23/25    Expected End:  09/23/25            Normalize. Ambulation sequencing without the use of an assistive device        Start:  06/23/25    Expected End:  09/23/25            Perform set to stand transfers independently.        Start:  06/23/25    Expected End:  09/23/25            .       Start:  06/23/25    Expected End:  09/23/25                Taisha Gregory, PT

## 2025-07-08 ENCOUNTER — CLINICAL SUPPORT (OUTPATIENT)
Dept: REHABILITATION | Facility: HOSPITAL | Age: 75
End: 2025-07-08
Payer: MEDICARE

## 2025-07-08 DIAGNOSIS — Z96.651 S/P TOTAL KNEE ARTHROPLASTY, RIGHT: ICD-10-CM

## 2025-07-08 DIAGNOSIS — G89.18 POSTOPERATIVE PAIN: ICD-10-CM

## 2025-07-08 DIAGNOSIS — M17.11 PRIMARY OSTEOARTHRITIS OF RIGHT KNEE: Primary | ICD-10-CM

## 2025-07-08 PROCEDURE — 97112 NEUROMUSCULAR REEDUCATION: CPT | Mod: PO,CQ

## 2025-07-08 PROCEDURE — 97140 MANUAL THERAPY 1/> REGIONS: CPT | Mod: PO,CQ

## 2025-07-08 PROCEDURE — 97110 THERAPEUTIC EXERCISES: CPT | Mod: PO,CQ

## 2025-07-08 NOTE — PROGRESS NOTES
Outpatient Rehab    Physical Therapy Visit    Patient Name: Deep Farah  MRN: [id number]  YOB: 1950  Encounter Date: 7/8/2025    Therapy Diagnosis:   Encounter Diagnoses   Name Primary?    Primary osteoarthritis of right knee Yes    S/P total knee arthroplasty, right     Postoperative pain      Physician: Beverly Jackson PA-C    Physician Orders: Eval and Treat  Medical Diagnosis: Primary osteoarthritis of right knee  S/P total knee arthroplasty, right  Postoperative pain  Surgical Diagnosis: Right knee athropolasty   Surgical Date: Not applicable for this Episode  Days Since Last Surgery: Not applicable for this Episode    Visit # / Visits Authorized:  1 / 10  Insurance Authorization Period: 6/23/2025 to 12/31/2025  Date of Evaluation: 6/23/2025  Plan of Care Certification: 6/24/2025 to 9/23/2025      PT/PTA: PTA   Number of PTA visits since last PT visit:1  Time In: 1300   Time Out: 1355  Total Time (in minutes): 55   Total Billable Time (in minutes): 55    FOTO:  Intake Score:  %  Survey Score 2:  %  Survey Score 3:  %    Precautions:       Subjective   Pt states that knee is doing well w/ some stiffness and discomfort intermitently throughout the day.         Objective    Knee FLX: 115 ARM, 118 PROM   Knee EXT: -18 AROM, -16 PROM        Treatment:  Therapeutic Exercise  TE 1: Quadriceps sets: 10 reps  3 sets  TE 2: alphabet alphabet ankle range of motion.  TE 3: Heel slides: 10 reps  3 sets  TE 4: Seated knee extensions:1 x10, 2 times  TE 5: Hamstring stretch: 30-sec hold, 3 reps  TE 6: Supine heel prop 5' 4#  TE 7: NuStep 10' LVL 3  Manual Therapy  MT 1: Patellar mobilizations to reduce stiffness  MT 2: Soft tissue massage around the quadriceps and hamstrings to alleviate tightness  MT 3: Passive stretching of hip flexors and hamstrings  MT 4: Knee FLX and EXT PROM  Balance/Neuromuscular Re-Education  NMR 1: SAQs 2x10  NMR 2: LAQs 2x10      Time Entry(in minutes):  Manual Therapy Time Entry:  15  Neuromuscular Re-Education Time Entry: 15  Therapeutic Exercise Time Entry: 25    Assessment & Plan   Assessment: Pt tolerated therapy session well and completed exercise program w/ goals to improve Knee ROM, LE Strength, and Functional Mobility. Mod discomfort noted throughout EXT/FLX exercises and manual interventions, however noted dramatic increase in ROM since initial eval (see obj). Educated pt to continue to perform exercises at home w/ emphasis on quad sets and heel slides, as well as performing gait training to improve ambulation w/ cane. Mod verbal and manual cues needed throughout therapy session to promote proper exercise form and improve quad contraction throughout exercise program. All exercises were tolerated w/ mod fatigue and min < > mod discomfort. Exercises challenged appropriately. Will continue to progress as tolerated.  Evaluation/Treatment Tolerance: Patient tolerated treatment well    The patient will continue to benefit from skilled outpatient physical therapy in order to address the deficits listed in the problem list on the initial evaluation, provide patient and family education, and maximize the patients level of independence in the home and community environments.     The patient's spiritual, cultural, and educational needs were considered, and the patient is agreeable to the plan of care and goals.           Plan: Continue to progress plan of care as tolerated.    Goals:   Active       Long term goals over 12 weeks       Ambulate independently without assisted device greater than 500 feet. (Progressing)       Start:  06/23/25    Expected End:  09/23/25            Resume all basic and light recreational activities of daily living without pain. (Progressing)       Start:  06/23/25    Expected End:  09/23/25            Improve lower extremity strength to within functional limits (Progressing)       Start:  06/23/25    Expected End:  09/23/25            Return to community activities  with independence. (Progressing)       Start:  06/23/25    Expected End:  09/23/25            . (Progressing)       Start:  06/23/25    Expected End:  09/23/25               short term goals over 6 weeks       Improve ment Knee Range of motion. zero to 110 degrees  (Progressing)       Start:  06/23/25    Expected End:  09/23/25            Increase right quadriceps strength to 4 / 5.  (Progressing)       Start:  06/23/25    Expected End:  09/23/25            Normalize. Ambulation sequencing without the use of an assistive device  (Progressing)       Start:  06/23/25    Expected End:  09/23/25            Perform set to stand transfers independently.  (Progressing)       Start:  06/23/25    Expected End:  09/23/25            . (Progressing)       Start:  06/23/25    Expected End:  09/23/25                Haim Brito, PTA

## 2025-07-10 ENCOUNTER — CLINICAL SUPPORT (OUTPATIENT)
Dept: REHABILITATION | Facility: HOSPITAL | Age: 75
End: 2025-07-10
Payer: MEDICARE

## 2025-07-10 DIAGNOSIS — Z96.651 S/P TOTAL KNEE ARTHROPLASTY, RIGHT: ICD-10-CM

## 2025-07-10 DIAGNOSIS — M17.11 PRIMARY OSTEOARTHRITIS OF RIGHT KNEE: Primary | ICD-10-CM

## 2025-07-10 DIAGNOSIS — Z96.651 HISTORY OF TOTAL RIGHT KNEE REPLACEMENT: ICD-10-CM

## 2025-07-10 DIAGNOSIS — G89.18 POSTOPERATIVE PAIN: ICD-10-CM

## 2025-07-10 PROCEDURE — 97112 NEUROMUSCULAR REEDUCATION: CPT | Mod: PO

## 2025-07-10 PROCEDURE — 97140 MANUAL THERAPY 1/> REGIONS: CPT | Mod: PO

## 2025-07-10 PROCEDURE — 97110 THERAPEUTIC EXERCISES: CPT | Mod: PO

## 2025-07-10 NOTE — PROGRESS NOTES
Outpatient Rehab    Physical Therapy Visit    Patient Name: Deep Farah  MRN: [id number]  YOB: 1950  Encounter Date: 7/10/2025    Therapy Diagnosis:   Encounter Diagnoses   Name Primary?    Primary osteoarthritis of right knee Yes    S/P total knee arthroplasty, right     Postoperative pain     History of total right knee replacement      Physician: Beverly Jackson PA-C    Physician Orders: Eval and Treat  Medical Diagnosis: Primary osteoarthritis of right knee  S/P total knee arthroplasty, right  Postoperative pain  Surgical Diagnosis: Right knee athropolasty   Surgical Date: Not applicable for this Episode  Days Since Last Surgery: Not applicable for this Episode    Visit # / Visits Authorized:  2 / 10  Insurance Authorization Period: 6/23/2025 to 12/31/2025  Date of Evaluation: 6/23/2025  Plan of Care Certification: 6/24/2025 to 9/23/2025      PT/PTA: PT   Number of PTA visits since last PT visit:0  Time In: 0708   Time Out: 0801  Total Time (in minutes): 53   Total Billable Time (in minutes): 53    FOTO:  Intake Score:  %  Survey Score 2:  %  Survey Score 3:  %    Precautions:  Right Lower Extremity Weight-Bearing Status: Full weight-bearing  Left Lower Extremity Weight-Bearing Status: Full weight-bearing         Subjective   He was a little sore after last visit..  Pain reported as 4/10.      Objective            Treatment:  Therapeutic Exercise  TE 1: Quadriceps sets: 10 reps  3 sets  TE 2: alphabet alphabet ankle range of motion.  TE 3: Heel slides: 10 reps  3 sets  TE 4: Seated knee extensions:1 x10, 2 times  TE 5: Hamstring stretch: 30-sec hold, 3 reps  TE 6: Supine heel prop 5' 4#  TE 7: NuStep 10' LVL 3  Manual Therapy  MT 1: Patellar mobilizations to reduce stiffness  MT 3: Passive stretching of hip flexors and hamstrings  MT 4: Knee FLX and EXT PROM  Balance/Neuromuscular Re-Education  NMR 1: SAQs 2x10  NMR 2: LAQs 2x10  NMR 3: Sciatic nerve glides: 3 x 10 on right    Time Entry(in  minutes):  Manual Therapy Time Entry: 15  Neuromuscular Re-Education Time Entry: 13  Therapeutic Exercise Time Entry: 25    Assessment & Plan   Assessment: Patient reports for follow up treatment with slight increase in complaint of soreness symptoms. Patient tolerated today's session with minimal complaints of pain with passive knee extension. Today's treatment included strengthening, range of motion, cardiovascular endurance, and manual therapy interventions. Patient tolerated progressions well and included increased weight for long arc quads to enhance quad activation.  He remains limited in lower extremity strength, cardiovascular endurance, and right tibiofemoral mobility. Patient is progressing well towards his goals. Patient will continue to benefit from skilled outpatient physical therapy to address the deficits listed in the problem list box on initial evaluation, provide patient/family education and to maximize patient's level of independence in the home and community environment.  Evaluation/Treatment Tolerance: Patient tolerated treatment well    The patient will continue to benefit from skilled outpatient physical therapy in order to address the deficits listed in the problem list on the initial evaluation, provide patient and family education, and maximize the patients level of independence in the home and community environments.     The patient's spiritual, cultural, and educational needs were considered, and the patient is agreeable to the plan of care and goals.           Plan: Continue to progress plan of care as tolerated.    Goals:   Active       Long term goals over 12 weeks       Ambulate independently without assisted device greater than 500 feet. (Progressing)       Start:  06/23/25    Expected End:  09/23/25            Resume all basic and light recreational activities of daily living without pain. (Progressing)       Start:  06/23/25    Expected End:  09/23/25            Improve lower  extremity strength to within functional limits (Progressing)       Start:  06/23/25    Expected End:  09/23/25            Return to community activities with independence. (Progressing)       Start:  06/23/25    Expected End:  09/23/25            . (Progressing)       Start:  06/23/25    Expected End:  09/23/25               short term goals over 6 weeks       Improve ment Knee Range of motion. zero to 110 degrees  (Progressing)       Start:  06/23/25    Expected End:  09/23/25            Increase right quadriceps strength to 4 / 5.  (Progressing)       Start:  06/23/25    Expected End:  09/23/25            Normalize. Ambulation sequencing without the use of an assistive device  (Progressing)       Start:  06/23/25    Expected End:  09/23/25            Perform set to stand transfers independently.  (Progressing)       Start:  06/23/25    Expected End:  09/23/25            . (Progressing)       Start:  06/23/25    Expected End:  09/23/25                Haim Regan, PT

## 2025-07-14 ENCOUNTER — CLINICAL SUPPORT (OUTPATIENT)
Dept: REHABILITATION | Facility: HOSPITAL | Age: 75
End: 2025-07-14
Payer: MEDICARE

## 2025-07-14 DIAGNOSIS — M17.11 PRIMARY OSTEOARTHRITIS OF RIGHT KNEE: ICD-10-CM

## 2025-07-14 DIAGNOSIS — Z96.651 S/P TOTAL KNEE ARTHROPLASTY, RIGHT: Primary | ICD-10-CM

## 2025-07-14 DIAGNOSIS — G89.18 POSTOPERATIVE PAIN: ICD-10-CM

## 2025-07-14 PROCEDURE — 97110 THERAPEUTIC EXERCISES: CPT | Mod: PO

## 2025-07-14 PROCEDURE — 97140 MANUAL THERAPY 1/> REGIONS: CPT | Mod: PO

## 2025-07-14 NOTE — PROGRESS NOTES
Outpatient Rehab    Physical Therapy Visit    Patient Name: Deep Farah  MRN: [id number]  YOB: 1950  Encounter Date: 7/14/2025    Therapy Diagnosis:   Encounter Diagnoses   Name Primary?    S/P total knee arthroplasty, right Yes    Primary osteoarthritis of right knee     Postoperative pain      Physician: Beverly Jackson PA-C    Physician Orders: Eval and Treat  Medical Diagnosis: Primary osteoarthritis of right knee  S/P total knee arthroplasty, right  Postoperative pain  Surgical Diagnosis: Right knee athropolasty   Surgical Date: Not applicable for this Episode  Days Since Last Surgery: Not applicable for this Episode    Visit # / Visits Authorized:  3 / 20  Insurance Authorization Period: 6/23/2025 to 12/31/2025  Date of Evaluation: 6/23/2025  Plan of Care Certification: 6/24/2025 to 9/23/2025      PT/PTA: PT   Number of PTA visits since last PT visit:0  Time In: 0100   Time Out: 0200  Total Time (in minutes): 60   Total Billable Time (in minutes): 45    FOTO:  Intake Score: 46%    Survey Score 2: Not applicable for this Episode%  Survey Score 3: Not applicable for this Episode%    Precautions:  Right Lower Extremity Weight-Bearing Status: Full weight-bearing  Left Lower Extremity Weight-Bearing Status: Full weight-bearing      Subjective   The patient reports continued soreness but reduced from previous session. Rates current pain at 3/10. Patient noted improvement in seated knee extension and less morning stiffness. Denies increased swelling or instability..  Pain reported as 3/10.      Objective       Knee Range of Motion    Observation: Mild right knee effusion noted; no erythema or warmth. Gait: Ambulates without assistive device with slight antalgic pattern. ROM: Right knee flexion AROM: 0° to 100° Right knee extension AROM: 0° Strength Testing (RLE): Quadriceps: 3+/5 Hamstrings: 4-/5 Functional Tests: Sit to stand (5 reps): 20 seconds Timed Up and Go: 14.2 seconds.            Treatment:  Therapeutic Exercise  TE 1: Quadriceps sets: 10 reps  3 sets  TE 4: Seated knee extensions:1 x10, 2 times  TE 5: Hamstring stretch: 30-sec hold, 3 reps  TE 6: Supine heel prop 5' 4#  TE 7: NuStep 10' LVL 3  Manual Therapy  MT 1: Patellar mobilizations to reduce stiffness  MT 2: Soft tissue massage around the quadriceps and hamstrings to alleviate tightness  MT 3: Passive stretching of hip flexors and hamstrings  MT 4: Knee FLX and EXT PROM      Time Entry(in minutes):  Manual Therapy Time Entry: 15  Therapeutic Exercise Time Entry: 30    Assessment & Plan   Assessment: The patient is making steady progress in strength, ROM, and functional endurance. Continues to benefit from skilled PT. Demonstrates appropriate tolerance of session with positive response to step progression and improved transfer ability. Gait is improving and patient nearing independence without AD. Interventions continue to focus on pain control, ROM gains, neuromuscular re-education, and safe progression toward community ambulation and ADL reintegration. PROGNOSIS Good, with continued compliance and participation in skilled therapy. Patient is on track to meet plan of care goals.   Evaluation/Treatment Tolerance: Patient tolerated treatment well    The patient will continue to benefit from skilled outpatient physical therapy in order to address the deficits listed in the problem list on the initial evaluation, provide patient and family education, and maximize the patients level of independence in the home and community environments.     The patient's spiritual, cultural, and educational needs were considered, and the patient is agreeable to the plan of care and goals.           Plan: Continue therapy 2-3x/week for next 8 weeks Advance strength training and weight-bearing activities Progress NuStep duration and intensity Introduce lateral stepping and single leg stance drills Re-assess gait and ROM weekly Continue education on  safe mobility and self-management    Goals:   Active       Long term goals over 12 weeks       Ambulate independently without assisted device greater than 500 feet. (Progressing)       Start:  06/23/25    Expected End:  09/23/25            Resume all basic and light recreational activities of daily living without pain. (Progressing)       Start:  06/23/25    Expected End:  09/23/25            Improve lower extremity strength to within functional limits (Progressing)       Start:  06/23/25    Expected End:  09/23/25            Return to community activities with independence. (Progressing)       Start:  06/23/25    Expected End:  09/23/25            . (Progressing)       Start:  06/23/25    Expected End:  09/23/25               short term goals over 6 weeks       Improve ment Knee Range of motion. zero to 110 degrees  (Progressing)       Start:  06/23/25    Expected End:  09/23/25            Increase right quadriceps strength to 4 / 5.  (Progressing)       Start:  06/23/25    Expected End:  09/23/25            Normalize. Ambulation sequencing without the use of an assistive device  (Progressing)       Start:  06/23/25    Expected End:  09/23/25            Perform set to stand transfers independently.  (Progressing)       Start:  06/23/25    Expected End:  09/23/25            . (Progressing)       Start:  06/23/25    Expected End:  09/23/25                Taisha Gregory, PT

## 2025-07-16 ENCOUNTER — CLINICAL SUPPORT (OUTPATIENT)
Dept: REHABILITATION | Facility: HOSPITAL | Age: 75
End: 2025-07-16
Payer: MEDICARE

## 2025-07-16 DIAGNOSIS — G89.18 POSTOPERATIVE PAIN: ICD-10-CM

## 2025-07-16 DIAGNOSIS — M17.11 PRIMARY OSTEOARTHRITIS OF RIGHT KNEE: ICD-10-CM

## 2025-07-16 DIAGNOSIS — Z96.651 S/P TOTAL KNEE ARTHROPLASTY, RIGHT: Primary | ICD-10-CM

## 2025-07-16 PROCEDURE — 97140 MANUAL THERAPY 1/> REGIONS: CPT | Mod: PO,CQ

## 2025-07-16 PROCEDURE — 97110 THERAPEUTIC EXERCISES: CPT | Mod: PO,CQ

## 2025-07-16 NOTE — PROGRESS NOTES
Outpatient Rehab    Physical Therapy Visit    Patient Name: Deep Farah  MRN: [id number]  YOB: 1950  Encounter Date: 7/16/2025    Therapy Diagnosis:   Encounter Diagnoses   Name Primary?    S/P total knee arthroplasty, right Yes    Primary osteoarthritis of right knee     Postoperative pain      Physician: Beverly Jackson PA-C    Physician Orders: Eval and Treat  Medical Diagnosis: Primary osteoarthritis of right knee  S/P total knee arthroplasty, right  Postoperative pain  Surgical Diagnosis: Right knee athropolasty   Surgical Date: Not applicable for this Episode  Days Since Last Surgery: Not applicable for this Episode    Visit # / Visits Authorized:  4 / 20  Insurance Authorization Period: 6/23/2025 to 12/31/2025  Date of Evaluation: 6/23/2025  Plan of Care Certification: 6/24/2025 to 9/23/2025      PT/PTA: PTA   Number of PTA visits since last PT visit:1  Time In: 1300   Time Out: 1355  Total Time (in minutes): 55   Total Billable Time (in minutes): 55    FOTO:  Intake Score: 46%  Survey Score 2: Not applicable for this Episode%  Survey Score 3: Not applicable for this Episode%    Precautions:  Right Lower Extremity Weight-Bearing Status: Full weight-bearing  Left Lower Extremity Weight-Bearing Status: Full weight-bearing      Subjective   Pt states that knee is doing well and doesn't hurt as much as it used to.         Objective            Treatment:  Therapeutic Exercise  TE 1: Quadriceps sets: 10 reps  3 sets  TE 7: NuStep 10' LVL 3  TE 8: Prone Hangs 5' 3#- 3' w/ weight  2' w/o weight  TE 9: ICE w/ 3# weight 5'  Manual Therapy  MT 1: Patellar mobilizations to reduce stiffness  MT 3: Passive stretching of hip flexors and hamstrings  MT 4: Knee EXT PROM  Balance/Neuromuscular Re-Education  NMR 4: SLR 15x 0#    Time Entry(in minutes):  Manual Therapy Time Entry: 15  Neuromuscular Re-Education Time Entry: 15  Therapeutic Exercise Time Entry: 25    Assessment & Plan   Assessment: Therapy session  today focused on improving knee EXT w/ multiple bouts of PROM, as well as exercises w/ focus on improving knee ext. Pt w/ mod < > max discomfort when performing prone hangs to improve knee EXT, as well as during knee PROM into EXT. Mod verbal and manual cues needed throughout therapy session to promote proper exercise form. Ice performed at end of session w/ addition of 3# weight as well. All exercises were tolerated w/ mod fatigue and required short rest breaks throughout session to reduce fatigue and pain. Exercises challenged appropriately. Will continue to progress as tolerated.  Evaluation/Treatment Tolerance: Patient tolerated treatment well    The patient will continue to benefit from skilled outpatient physical therapy in order to address the deficits listed in the problem list on the initial evaluation, provide patient and family education, and maximize the patients level of independence in the home and community environments.     The patient's spiritual, cultural, and educational needs were considered, and the patient is agreeable to the plan of care and goals.           Plan: Continue to progress plan of care as tolerated.    Goals:   Active       Long term goals over 12 weeks       Ambulate independently without assisted device greater than 500 feet. (Progressing)       Start:  06/23/25    Expected End:  09/23/25            Resume all basic and light recreational activities of daily living without pain. (Progressing)       Start:  06/23/25    Expected End:  09/23/25            Improve lower extremity strength to within functional limits (Progressing)       Start:  06/23/25    Expected End:  09/23/25            Return to community activities with independence. (Progressing)       Start:  06/23/25    Expected End:  09/23/25            . (Progressing)       Start:  06/23/25    Expected End:  09/23/25               short term goals over 6 weeks       Improve ment Knee Range of motion. zero to 110 degrees   (Progressing)       Start:  06/23/25    Expected End:  09/23/25            Increase right quadriceps strength to 4 / 5.  (Progressing)       Start:  06/23/25    Expected End:  09/23/25            Normalize. Ambulation sequencing without the use of an assistive device  (Progressing)       Start:  06/23/25    Expected End:  09/23/25            Perform set to stand transfers independently.  (Progressing)       Start:  06/23/25    Expected End:  09/23/25            . (Progressing)       Start:  06/23/25    Expected End:  09/23/25                Haim Brito, PTA

## 2025-07-21 ENCOUNTER — CLINICAL SUPPORT (OUTPATIENT)
Dept: REHABILITATION | Facility: HOSPITAL | Age: 75
End: 2025-07-21
Payer: MEDICARE

## 2025-07-21 DIAGNOSIS — M17.11 PRIMARY OSTEOARTHRITIS OF RIGHT KNEE: ICD-10-CM

## 2025-07-21 DIAGNOSIS — G89.18 POSTOPERATIVE PAIN: ICD-10-CM

## 2025-07-21 DIAGNOSIS — Z96.651 HISTORY OF TOTAL RIGHT KNEE REPLACEMENT: ICD-10-CM

## 2025-07-21 DIAGNOSIS — Z96.651 S/P TOTAL KNEE ARTHROPLASTY, RIGHT: Primary | ICD-10-CM

## 2025-07-21 PROCEDURE — 97110 THERAPEUTIC EXERCISES: CPT | Mod: PO

## 2025-07-21 PROCEDURE — 97112 NEUROMUSCULAR REEDUCATION: CPT | Mod: PO

## 2025-07-21 NOTE — PROGRESS NOTES
Outpatient Rehab    Physical Therapy Visit      Patient Name: Deep Farah  MRN: [id number]  YOB: 1950  Encounter Date: 7/21/2025    Therapy Diagnosis:   Encounter Diagnoses   Name Primary?    S/P total knee arthroplasty, right Yes    Primary osteoarthritis of right knee     Postoperative pain     History of total right knee replacement      Physician: Beverly Jackson PA-C    Physician Orders: Eval and Treat  Medical Diagnosis: Primary osteoarthritis of right knee  S/P total knee arthroplasty, right  Postoperative pain  Surgical Diagnosis: Right knee athropolasty   Surgical Date: Not applicable for this Episode  Days Since Last Surgery: Not applicable for this Episode    Visit # / Visits Authorized:  8 / 20  Insurance Authorization Period: 6/23/2025 to 12/31/2025  Date of Evaluation: 6/23/2025  Plan of Care Certification: 6/24/2025 to 9/23/2025      PT/PTA: PT   Number of PTA visits since last PT visit:1  Time In: 1500   Time Out: 1553  Total Time (in minutes): 53   Total Billable Time (in minutes): 53    FOTO:  Intake Score (%): 46  Survey Score 2 (%): Not applicable for this Episode  Survey Score 3 (%): Not applicable for this Episode    Precautions:  Right Lower Extremity Weight-Bearing Status: Full weight-bearing  Left Lower Extremity Weight-Bearing Status: Full weight-bearing      Subjective   Patient reports ongoing improvement in right knee status post total knee arthroplasty. He reports less pain compared to previous visits and denies new swelling or instability. Describes soreness that is manageable and a reduction in morning stiffness. Rates current pain at 3/10. States improved tolerance with seated knee extension and ambulation without assistive device. Reports exercises have been helpful in reducing discomfort..  Pain reported as 3/10.      Objective       Knee Range of Motion    Patient reports ongoing improvement in right knee status post total knee arthroplasty; he reports less pain  compared to previous visits, and denies new swelling or instability. He describes soreness that is manageable, with a reduction in morning stiffness. He rates current pain at 3/10, states improved tolerance with seated knee extension and ambulation without assistive device, and reports that exercises have been helpful in reducing discomfort.           Treatment:  Therapeutic Exercise  TE 2: alphabet alphabet ankle range of motion.  TE 3: Heel slides: 10 reps  3 sets  TE 4: Seated knee extensions:1 x10, 2 times  TE 5: Hamstring stretch: 30-sec hold, 3 reps  TE 6: Supine heel prop 5' 4#  TE 7: NuStep 10' LVL 3  TE 8: Prone Hangs 5' 3#- 3' w/ weight  2' w/o weight  TE 9: ICE w/ 3# weight 5'  Balance/Neuromuscular Re-Education  NMR 2: LAQs 2x10  NMR 3: Sciatic nerve glides: 3 x 10 on right  NMR 4: SLR 15x 0#    Time Entry(in minutes):  Neuromuscular Re-Education Time Entry: 15  Therapeutic Exercise Time Entry: 38    Assessment & Plan   Assessment: Patient is making steady progress following right total knee arthroplasty. Demonstrates improved tolerance to exercise with mild to moderate fatigue and occasional discomfort. Knee ROM and strength are gradually improving. Pain is manageable and trending downward. Gait is near normal and patient is progressing toward full functional independence. Therapy continues to focus on ROM, strength, neuromuscular control, and pain reduction. Prognosis Good. Patient is demonstrating progress consistent with expected postoperative course. With continued participation in therapy, prognosis for full return to function is favorable within 12 weeks.        The patient will continue to benefit from skilled outpatient physical therapy in order to address the deficits listed in the problem list on the initial evaluation, provide patient and family education, and maximize the patients level of independence in the home and community environments.     The patient's spiritual, cultural, and  educational needs were considered, and the patient is agreeable to the plan of care and goals.           Plan: Plan of Care (from 6/23/2025 through 9/23/2025). Continue skilled therapy 2-3 times per week for 12 weeks. Advance therapeutic exercise for strength and ROM. Introduce lateral stepping, mini-squats, and proprioception tasks. Progress NuStep duration and resistance. Add step-ups and weight shifts as tolerated. Weekly reassessment of gait, ROM, and strength Ongoing education in self-management and safe mobility strategies.    Goals:   Active       Long term goals over 12 weeks       Ambulate independently without assisted device greater than 500 feet. (Progressing)       Start:  06/23/25    Expected End:  09/23/25            Resume all basic and light recreational activities of daily living without pain. (Progressing)       Start:  06/23/25    Expected End:  09/23/25            Improve lower extremity strength to within functional limits (Progressing)       Start:  06/23/25    Expected End:  09/23/25            Return to community activities with independence. (Progressing)       Start:  06/23/25    Expected End:  09/23/25            . (Progressing)       Start:  06/23/25    Expected End:  09/23/25               short term goals over 6 weeks       Improve ment Knee Range of motion. zero to 110 degrees  (Progressing)       Start:  06/23/25    Expected End:  09/23/25            Increase right quadriceps strength to 4 / 5.  (Progressing)       Start:  06/23/25    Expected End:  09/23/25            Normalize. Ambulation sequencing without the use of an assistive device  (Progressing)       Start:  06/23/25    Expected End:  09/23/25            Perform set to stand transfers independently.  (Progressing)       Start:  06/23/25    Expected End:  09/23/25            . (Progressing)       Start:  06/23/25    Expected End:  09/23/25                Taisha Gregory, PT

## 2025-07-23 ENCOUNTER — CLINICAL SUPPORT (OUTPATIENT)
Dept: REHABILITATION | Facility: HOSPITAL | Age: 75
End: 2025-07-23
Payer: MEDICARE

## 2025-07-23 DIAGNOSIS — Z96.651 S/P TOTAL KNEE ARTHROPLASTY, RIGHT: Primary | ICD-10-CM

## 2025-07-23 DIAGNOSIS — G89.18 POSTOPERATIVE PAIN: ICD-10-CM

## 2025-07-23 DIAGNOSIS — Z96.651 HISTORY OF TOTAL RIGHT KNEE REPLACEMENT: ICD-10-CM

## 2025-07-23 DIAGNOSIS — M17.11 PRIMARY OSTEOARTHRITIS OF RIGHT KNEE: ICD-10-CM

## 2025-07-23 PROCEDURE — 97140 MANUAL THERAPY 1/> REGIONS: CPT | Mod: PO

## 2025-07-23 PROCEDURE — 97110 THERAPEUTIC EXERCISES: CPT | Mod: PO

## 2025-07-25 ENCOUNTER — CLINICAL SUPPORT (OUTPATIENT)
Dept: REHABILITATION | Facility: HOSPITAL | Age: 75
End: 2025-07-25
Payer: MEDICARE

## 2025-07-25 DIAGNOSIS — Z96.651 S/P TOTAL KNEE ARTHROPLASTY, RIGHT: Primary | ICD-10-CM

## 2025-07-25 DIAGNOSIS — Z96.651 HISTORY OF TOTAL RIGHT KNEE REPLACEMENT: ICD-10-CM

## 2025-07-25 DIAGNOSIS — G89.18 POSTOPERATIVE PAIN: ICD-10-CM

## 2025-07-25 DIAGNOSIS — M17.11 PRIMARY OSTEOARTHRITIS OF RIGHT KNEE: ICD-10-CM

## 2025-07-25 PROCEDURE — 97110 THERAPEUTIC EXERCISES: CPT | Mod: PO

## 2025-07-25 PROCEDURE — 97140 MANUAL THERAPY 1/> REGIONS: CPT | Mod: PO

## 2025-07-28 ENCOUNTER — CLINICAL SUPPORT (OUTPATIENT)
Dept: REHABILITATION | Facility: HOSPITAL | Age: 75
End: 2025-07-28
Payer: MEDICARE

## 2025-07-28 DIAGNOSIS — Z96.651 S/P TOTAL KNEE ARTHROPLASTY, RIGHT: Primary | ICD-10-CM

## 2025-07-28 DIAGNOSIS — Z96.651 HISTORY OF TOTAL RIGHT KNEE REPLACEMENT: ICD-10-CM

## 2025-07-28 DIAGNOSIS — M17.11 PRIMARY OSTEOARTHRITIS OF RIGHT KNEE: ICD-10-CM

## 2025-07-28 DIAGNOSIS — G89.18 POSTOPERATIVE PAIN: ICD-10-CM

## 2025-07-28 PROCEDURE — 97112 NEUROMUSCULAR REEDUCATION: CPT | Mod: PO

## 2025-07-28 PROCEDURE — 97110 THERAPEUTIC EXERCISES: CPT | Mod: PO

## 2025-07-28 NOTE — PROGRESS NOTES
Outpatient Rehab    Physical Therapy Visit    Patient Name: Deep Farah  MRN: [id number]  YOB: 1950  Encounter Date: 7/28/2025    Therapy Diagnosis:   Encounter Diagnoses   Name Primary?    S/P total knee arthroplasty, right Yes    Primary osteoarthritis of right knee     History of total right knee replacement     Postoperative pain      Physician: Beverly Jackson PA-C    Physician Orders: Eval and Treat  Medical Diagnosis: Primary osteoarthritis of right knee  S/P total knee arthroplasty, right  Postoperative pain  Surgical Diagnosis: Right knee athropolasty   Surgical Date: Not applicable for this Episode  Days Since Last Surgery: Not applicable for this Episode    Visit # / Visits Authorized:  8 / 20  Insurance Authorization Period: 6/23/2025 to 12/31/2025  Date of Evaluation: 6/23/2025  Plan of Care Certification: 6/24/2025 to 9/23/2025      PT/PTA: PT   Number of PTA visits since last PT visit:0  Time In: 1400   Time Out: 1453  Total Time (in minutes): 53   Total Billable Time (in minutes): 53    FOTO:  Intake Score (%): 46  Survey Score 2 (%): Not applicable for this Episode  Survey Score 3 (%): Not applicable for this Episode    Precautions:  Right Lower Extremity Weight-Bearing Status: Full weight-bearing  Left Lower Extremity Weight-Bearing Status: Full weight-bearing      Subjective   Patient continues to demonstrate improvement in right knee function following total knee arthroplasty. Reports further decrease in morning stiffness and increased ease with stair negotiation and community ambulation. Describes intermittent post-exercise soreness as manageable. Denies swelling, locking, or instability. Pain is rated 2/10 with exertion and 0-1/10 at rest..  Pain reported as 1/10.      Objective       Knee Range of Motion    Knee Range of Motion Right knee AROM flexion: 0° to 112°, Right knee AROM extension: 0°. Posture and Gait Gait: Mild residual antalgic pattern with improved stance phase.  Ambulating independently. Transfers: Independent sit-to-stand and bed mobility. Strength Testing - Right Lower Extremity Quadriceps: 4/5 Hamstrings: 4/5 Balance and Functional Testing Single-leg stance: 20 seconds, right leg Timed Up and Go (TUG): 12.6 seconds Ambulation: 525 feet continuous with minimal rest.           Treatment:  Therapeutic Exercise  TE 1: Quadriceps sets: 10 reps  3 sets  TE 3: Heel slides: 10 reps  3 sets  TE 4: Seated knee extensions:1 x10, 2 times  TE 5: Hamstring stretch: 30-sec hold, 3 reps  TE 6: Supine heel prop 5' 4#  TE 7: NuStep 10' LVL 5  TE 8: Prone Hangs 5' 3#- 3' w/ weight  2' w/o weight  Balance/Neuromuscular Re-Education  NMR 1: SAQs 2x10  NMR 2: LAQs 2x10  NMR 3: Sciatic nerve glides: 3 x 10 on right  NMR 4: SLR 15x 0#    Time Entry(in minutes):  Neuromuscular Re-Education Time Entry: 15  Therapeutic Exercise Time Entry: 45    Assessment & Plan   Assessment: Patient exhibits continued post-operative gains in knee mobility, strength, and gait mechanics. Demonstrates improved performance on functional tasks and endurance-based activities. Mild soreness post-treatment is expected and resolving with rest. Patient remains engaged and compliant with HEP and in-clinic interventions. Prognosis Good. Based on current trajectory, patient is on track to achieve full functional recovery within the 12-week rehabilitation window.   Evaluation/Treatment Tolerance: Patient tolerated treatment well    The patient will continue to benefit from skilled outpatient physical therapy in order to address the deficits listed in the problem list on the initial evaluation, provide patient and family education, and maximize the patients level of independence in the home and community environments.     The patient's spiritual, cultural, and educational needs were considered, and the patient is agreeable to the plan of care and goals.           Plan: Plan of Care (6/23/2025 to 9/23/2025) Continue skilled  therapy 2-3 times per week. Advance strength and endurance tasks. Introduce stair descent and community mobility simulation. Incorporate dual-task training and progressive balance challenges. Emphasize home program progression with safety monitoring. Reassess weekly for strength, ROM, and functional tolerance.    Goals:   Active       Long term goals over 12 weeks       Ambulate independently without assisted device greater than 500 feet. (Progressing)       Start:  06/23/25    Expected End:  09/23/25            Resume all basic and light recreational activities of daily living without pain. (Progressing)       Start:  06/23/25    Expected End:  09/23/25            Improve lower extremity strength to within functional limits (Progressing)       Start:  06/23/25    Expected End:  09/23/25            Return to community activities with independence. (Progressing)       Start:  06/23/25    Expected End:  09/23/25            . (Progressing)       Start:  06/23/25    Expected End:  09/23/25               short term goals over 6 weeks       Improve ment Knee Range of motion. zero to 110 degrees  (Progressing)       Start:  06/23/25    Expected End:  09/23/25            Increase right quadriceps strength to 4 / 5.  (Progressing)       Start:  06/23/25    Expected End:  09/23/25            Normalize. Ambulation sequencing without the use of an assistive device  (Progressing)       Start:  06/23/25    Expected End:  09/23/25            Perform set to stand transfers independently.  (Progressing)       Start:  06/23/25    Expected End:  09/23/25                Taisha Gregory, PT

## 2025-07-28 NOTE — PROGRESS NOTES
Outpatient Rehab    Physical Therapy Visit    Patient Name: Deep Farah  MRN: [id number]  YOB: 1950  Encounter Date: 7/25/2025    Therapy Diagnosis:   Encounter Diagnoses   Name Primary?    S/P total knee arthroplasty, right Yes    Primary osteoarthritis of right knee     History of total right knee replacement     Postoperative pain      Physician: Beverly Jackson PA-C    Physician Orders: Eval and Treat  Medical Diagnosis: Primary osteoarthritis of right knee  S/P total knee arthroplasty, right  Postoperative pain  Surgical Diagnosis: Right knee athropolasty   Surgical Date: Not applicable for this Episode  Days Since Last Surgery: Not applicable for this Episode    Visit # / Visits Authorized:  8 / 20  Insurance Authorization Period: 6/23/2025 to 12/31/2025  Date of Evaluation: 6/23/2025  Plan of Care Certification: 6/24/2025 to 9/23/2025      PT/PTA: PT   Number of PTA visits since last PT visit:0  Time In: 1100   Time Out: 1153  Total Time (in minutes): 53   Total Billable Time (in minutes): 53    FOTO:  Intake Score (%): 46  Survey Score 2 (%): Not applicable for this Episode  Survey Score 3 (%): Not applicable for this Episode    Precautions:  Right Lower Extremity Weight-Bearing Status: Full weight-bearing  Left Lower Extremity Weight-Bearing Status: Full weight-bearing      Subjective   Patient continues to demonstrate improvement in right knee function following total knee arthroplasty. Reports decreased stiffness in the morning and greater ease in completing household tasks. Describes intermittent soreness post-exercise that resolves with rest. Denies swelling, instability, or locking. Pain is rated at 2-3/10 at worst and 1/10 at rest..  Pain reported as 3/10.      Objective       Knee Range of Motion    Knee Range of Motion: Right knee AROM flexion: 0° to 110° Right knee AROM extension: 0° Posture and Gait: Gait pattern is mildly antalgic but improved since prior visit. Ambulating  independently without assistive device. Strength Testing - Right Lower Extremity: Quadriceps: 4-/5 Hamstrings: 4/5 Balance and Functional Testing: Sit-to-stand transfers: Independent TU.2 seconds Ambulation endurance: 450 feet with minimal rest            Treatment:  Therapeutic Exercise  TE 1: Quadriceps sets: 10 reps  3 sets  TE 2: alphabet alphabet ankle range of motion.  TE 3: Heel slides: 10 reps  3 sets  TE 4: Seated knee extensions:1 x10, 2 times  TE 5: Hamstring stretch: 30-sec hold, 3 reps  TE 6: Supine heel prop 5' 4#  TE 7: NuStep 10' LVL 4  TE 8: Prone Hangs 5' 3#- 3' w/ weight  2' w/o weight  Manual Therapy  MT 1: Patellar mobilizations to reduce stiffness  MT 2: Soft tissue massage around the quadriceps and hamstrings to alleviate tightness  MT 3: Passive stretching of hip flexors and hamstrings  MT 4: Knee EXT PROM      Time Entry(in minutes):  Manual Therapy Time Entry: 15  Therapeutic Exercise Time Entry: 45    Assessment & Plan   Assessment: Patient exhibits steady postoperative progress. Gains observed in ROM, strength, and ambulation tolerance. Continues to report minor soreness with activity but no adverse events. Function is steadily improving toward baseline. Demonstrates safety with HEP and treatment tasks. Prognosis Good. Patient is expected to regain functional independence within 12 weeks of rehabilitation based on current progress. Full return to ADLs and community mobility remains achievable with continued compliance.        The patient will continue to benefit from skilled outpatient physical therapy in order to address the deficits listed in the problem list on the initial evaluation, provide patient and family education, and maximize the patients level of independence in the home and community environments.     The patient's spiritual, cultural, and educational needs were considered, and the patient is agreeable to the plan of care and goals.           Plan: Plan of Care  (6/23/2025 to 9/23/2025) Continue skilled therapy 2-3 times per week. Progress dynamic balance activities and mini-squats. Introduce stair climbing and dual-task drills. Advance resistance during exercise tasks. Educate on safe movement strategies for community re-integration. Reassess gait, strength, and ROM weekly.    Goals:   Active       Long term goals over 12 weeks       Ambulate independently without assisted device greater than 500 feet. (Progressing)       Start:  06/23/25    Expected End:  09/23/25            Resume all basic and light recreational activities of daily living without pain. (Progressing)       Start:  06/23/25    Expected End:  09/23/25            Improve lower extremity strength to within functional limits (Progressing)       Start:  06/23/25    Expected End:  09/23/25            Return to community activities with independence. (Progressing)       Start:  06/23/25    Expected End:  09/23/25            . (Progressing)       Start:  06/23/25    Expected End:  09/23/25               short term goals over 6 weeks       Improve ment Knee Range of motion. zero to 110 degrees  (Progressing)       Start:  06/23/25    Expected End:  09/23/25            Increase right quadriceps strength to 4 / 5.  (Progressing)       Start:  06/23/25    Expected End:  09/23/25            Normalize. Ambulation sequencing without the use of an assistive device  (Progressing)       Start:  06/23/25    Expected End:  09/23/25            Perform set to stand transfers independently.  (Progressing)       Start:  06/23/25    Expected End:  09/23/25                Taisha Gregory, PT

## 2025-07-28 NOTE — PROGRESS NOTES
Outpatient Rehab    Physical Therapy Visit    Patient Name: Deep Farah  MRN: [id number]  YOB: 1950  Encounter Date: 7/23/2025    Therapy Diagnosis:   Encounter Diagnoses   Name Primary?    S/P total knee arthroplasty, right Yes    Primary osteoarthritis of right knee     History of total right knee replacement     Postoperative pain      Physician: Beverly Jackson PA-C    Physician Orders: Eval and Treat  Medical Diagnosis: Primary osteoarthritis of right knee  S/P total knee arthroplasty, right  Postoperative pain  Surgical Diagnosis: Right knee athropolasty   Surgical Date: Not applicable for this Episode  Days Since Last Surgery: Not applicable for this Episode    Visit # / Visits Authorized:  8 / 20  Insurance Authorization Period: 6/23/2025 to 12/31/2025  Date of Evaluation: 6/23/2025  Plan of Care Certification: 6/24/2025 to 9/23/2025      PT/PTA: PT   Number of PTA visits since last PT visit:0  Time In: 1400   Time Out: 1453  Total Time (in minutes): 53   Total Billable Time (in minutes):      FOTO:  Intake Score (%): 46  Survey Score 2 (%): Not applicable for this Episode  Survey Score 3 (%): Not applicable for this Episode    Precautions:  Right Lower Extremity Weight-Bearing Status: Full weight-bearing  Left Lower Extremity Weight-Bearing Status: Full weight-bearing      Subjective   Patient reports ongoing improvement in right knee status post total knee arthroplasty. He reports less pain compared to previous visits, and denies new swelling or instability. Describes soreness that is manageable and a reduction in morning stiffness. Rates current pain at 3/10. States improved tolerance with seated knee extension and ambulation without assistive device. Reports exercises have been helpful in reducing discomfort. Pain reported as 3/10..  Pain reported as 3/10.      Objective       Knee Range of Motion    Knee Range of Motion: Right knee flexion AROM: 0° to 105°, Right knee extension AROM: 0°  Posture and Gait: Mild antalgic gait pattern. No use of assistive device. Strength Testing (Right Lower Extremity): Quadriceps: 3+/5 Hamstrings: 4-/5.           Treatment:  Therapeutic Exercise  TE 1: Quadriceps sets: 10 reps  3 sets  TE 2: alphabet alphabet ankle range of motion.  TE 3: Heel slides: 10 reps  3 sets  TE 4: Seated knee extensions:1 x10, 2 times  TE 5: Hamstring stretch: 30-sec hold, 3 reps  TE 6: Supine heel prop 5' 4#  TE 7: NuStep 10' LVL 4  TE 8: Prone Hangs 5' 3#- 3' w/ weight  2' w/o weight  TE 9: ICE w/ 3# weight 5'  Manual Therapy  MT 1: Patellar mobilizations to reduce stiffness  MT 2: Soft tissue massage around the quadriceps and hamstrings to alleviate tightness  MT 3: Passive stretching of hip flexors and hamstrings  MT 4: Knee EXT PROM      Time Entry(in minutes):  Manual Therapy Time Entry: 15  Therapeutic Exercise Time Entry: 45    Assessment & Plan   Assessment: Patient is making steady progress following right total knee arthroplasty. Demonstrates improved tolerance to exercise with mild to moderate fatigue and occasional discomfort. Knee ROM and strength are gradually improving. Pain is manageable and trending downward. Gait is near normal, and patient is progressing toward full functional independence. Therapy continues to focus on ROM, strength, neuromuscular control, and pain reduction. Prognosis: Good. Patient is demonstrating progress consistent with expected postoperative course. With continued participation in therapy, prognosis for full return to function is favorable within 12 weeks.   Evaluation/Treatment Tolerance: Patient tolerated treatment well    The patient will continue to benefit from skilled outpatient physical therapy in order to address the deficits listed in the problem list on the initial evaluation, provide patient and family education, and maximize the patients level of independence in the home and community environments.     The patient's spiritual,  cultural, and educational needs were considered, and the patient is agreeable to the plan of care and goals.           Plan: Plan of Care (from 6/23/2025 through 9/23/2025): Continue skilled therapy 2-3 times per week for 12 weeks. Advance therapeutic exercise for strength and ROM. Introduce lateral stepping, mini-squats, and proprioception tasks. Progress NuStep duration and resistance. Add step-ups and weight shifts as tolerated. Weekly reassessment of gait, ROM, and strength. Ongoing education in self-management and safe mobility strategies.    Goals:   Active       Long term goals over 12 weeks       Ambulate independently without assisted device greater than 500 feet. (Progressing)       Start:  06/23/25    Expected End:  09/23/25            Resume all basic and light recreational activities of daily living without pain. (Progressing)       Start:  06/23/25    Expected End:  09/23/25            Improve lower extremity strength to within functional limits (Progressing)       Start:  06/23/25    Expected End:  09/23/25            Return to community activities with independence. (Progressing)       Start:  06/23/25    Expected End:  09/23/25            . (Progressing)       Start:  06/23/25    Expected End:  09/23/25               short term goals over 6 weeks       Improve ment Knee Range of motion. zero to 110 degrees  (Progressing)       Start:  06/23/25    Expected End:  09/23/25            Increase right quadriceps strength to 4 / 5.  (Progressing)       Start:  06/23/25    Expected End:  09/23/25            Normalize. Ambulation sequencing without the use of an assistive device  (Progressing)       Start:  06/23/25    Expected End:  09/23/25            Perform set to stand transfers independently.  (Progressing)       Start:  06/23/25    Expected End:  09/23/25                Taisha Gregory, PT

## 2025-07-30 ENCOUNTER — CLINICAL SUPPORT (OUTPATIENT)
Dept: REHABILITATION | Facility: HOSPITAL | Age: 75
End: 2025-07-30
Payer: MEDICARE

## 2025-07-30 DIAGNOSIS — Z96.651 HISTORY OF TOTAL RIGHT KNEE REPLACEMENT: ICD-10-CM

## 2025-07-30 DIAGNOSIS — M17.11 PRIMARY OSTEOARTHRITIS OF RIGHT KNEE: ICD-10-CM

## 2025-07-30 DIAGNOSIS — Z96.651 S/P TOTAL KNEE ARTHROPLASTY, RIGHT: Primary | ICD-10-CM

## 2025-07-30 DIAGNOSIS — G89.18 POSTOPERATIVE PAIN: ICD-10-CM

## 2025-07-30 PROCEDURE — 97112 NEUROMUSCULAR REEDUCATION: CPT | Mod: PO

## 2025-07-30 PROCEDURE — 97140 MANUAL THERAPY 1/> REGIONS: CPT | Mod: PO

## 2025-07-30 NOTE — PROGRESS NOTES
Outpatient Rehab    Physical Therapy Progress Note    Patient Name: Deep Farah  MRN: [id number]  YOB: 1950  Encounter Date: 7/30/2025    Therapy Diagnosis:   Encounter Diagnoses   Name Primary?    S/P total knee arthroplasty, right Yes    Primary osteoarthritis of right knee     History of total right knee replacement     Postoperative pain      Physician: Beverly Jackson PA-C    Physician Orders: Eval and Treat  Medical Diagnosis: Primary osteoarthritis of right knee  S/P total knee arthroplasty, right  Postoperative pain  Surgical Diagnosis: Right knee athropolasty   Surgical Date: Not applicable for this Episode  Days Since Last Surgery: Not applicable for this Episode    Visit # / Visits Authorized:  9 / 20  Insurance Authorization Period: 6/23/2025 to 12/31/2025  Date of Evaluation: 6/23/2025  Plan of Care Certification: 6/24/2025 to 9/23/2025      PT/PTA: PT   Number of PTA visits since last PT visit:0  Time In: 0206   Time Out: 0259  Total Time (in minutes): 53   Total Billable Time (in minutes): 26    FOTO:  Intake Score (%): 46  Survey Score 2 (%): Not applicable for this Episode  Survey Score 3 (%): Not applicable for this Episode    Precautions:  Right Lower Extremity Weight-Bearing Status: Full weight-bearing  Left Lower Extremity Weight-Bearing Status: Full weight-bearing    Subjective   Patient reports no new complaints. He is not using cane or walker anymore..  Pain reported as 1/10.      Objective       Knee Range of Motion   Right Knee   Active (deg) Passive (deg) Pain   Flexion 116 118     Extension -10 -8                        Hip Strength - Planes of Motion   Right Strength Right Pain Left Strength Left  Pain   Flexion (L2) 4         Extension           ABduction           ADduction           Internal Rotation           External Rotation               Knee Strength   Right Strength Right Pain Left Strength Left  Pain   Flexion (S2) 4+         Prone Flexion           Extension  (L3) 4-                       Treatment:  Therapeutic Exercise  TE 1: Quadriceps sets: 10 reps  3 sets  TE 2: alphabet alphabet ankle range of motion.  TE 3: Heel slides: 10 reps  3 sets  TE 4: Seated knee extensions:1 x10, 2 times  TE 5: Hamstring stretch: 30-sec hold, 3 reps - NP  TE 6: Supine heel prop 5' 4#  TE 7: NuStep 10' LVL 5  TE 8: Prone Hangs 5' 3#- 3' w/ weight  2' w/o weight  TE 10: Reassessment  Manual Therapy  MT 1: Patellar mobilizations to reduce stiffness  MT 2: Soft tissue massage around the quadriceps and hamstrings to alleviate tightness  MT 3: Passive stretching of hip flexors and hamstrings  MT 4: Knee EXT PROM  Balance/Neuromuscular Re-Education  NMR 1: SAQs 2x10  NMR 2: LAQs 2x10  NMR 3: Sciatic nerve glides: 3 x 10 on right    Time Entry(in minutes):  Manual Therapy Time Entry: 15  Neuromuscular Re-Education Time Entry: 15  Therapeutic Exercise Time Entry: 23    Assessment & Plan   Assessment: Patient reports for re-assessment and follow up treatment. Patient reports improvement in symptoms since start of care and has made progressed towards all goals. Patient demonstrates improvement in knee range of motion but continues to demonstrate difficulty with  quad weakness and inability to obtain full knee extension. Patient will continue to benefit from skilled physical therapy services for 2x/week for 4 weeks. Today's treatment included strengthening, range of motion, balance training, gait training, cardiovascular endurance, and manual therapy interventions and progressions which were tolerated well. Patient will continue to benefit from skilled PT to address deficits and maximize function. Face to Face meeting occurred between PT and PTA in order to discuss progress thus far and plan of care moving forward.  Evaluation/Treatment Tolerance: Patient tolerated treatment well    The patient will continue to benefit from skilled outpatient physical therapy in order to address the deficits  listed in the problem list on the initial evaluation, provide patient and family education, and maximize the patients level of independence in the home and community environments.     The patient's spiritual, cultural, and educational needs were considered, and the patient is agreeable to the plan of care and goals.           Plan: Plan of Care (6/23/2025 to 9/23/2025) Continue skilled therapy 2-3 times per week. Advance strength and endurance tasks. Introduce stair descent and community mobility simulation. Incorporate dual-task training and progressive balance challenges. Emphasize home program progression with safety monitoring. Reassess weekly for strength, ROM, and functional tolerance.    Goals:   Active       Long term goals over 12 weeks       Ambulate independently without assisted device greater than 500 feet. (Met)       Start:  06/23/25    Expected End:  09/23/25    Resolved:  07/30/25         Resume all basic and light recreational activities of daily living without pain. (Progressing)       Start:  06/23/25    Expected End:  09/23/25            Improve lower extremity strength to within functional limits (Progressing)       Start:  06/23/25    Expected End:  09/23/25            Return to community activities with independence. (Met)       Start:  06/23/25    Expected End:  09/23/25    Resolved:  07/30/25         . (Progressing)       Start:  06/23/25    Expected End:  09/23/25               short term goals over 6 weeks       Improve ment Knee Range of motion. zero to 110 degrees  (Progressing)       Start:  06/23/25    Expected End:  09/23/25            Increase right quadriceps strength to 4 / 5.  (Progressing)       Start:  06/23/25    Expected End:  09/23/25            Normalize. Ambulation sequencing without the use of an assistive device  (Progressing)       Start:  06/23/25    Expected End:  09/23/25            Perform set to stand transfers independently.  (Progressing)       Start:  06/23/25     Expected End:  09/23/25                Haim Regan PT

## 2025-08-01 ENCOUNTER — CLINICAL SUPPORT (OUTPATIENT)
Dept: REHABILITATION | Facility: HOSPITAL | Age: 75
End: 2025-08-01
Payer: MEDICARE

## 2025-08-01 DIAGNOSIS — M17.11 PRIMARY OSTEOARTHRITIS OF RIGHT KNEE: ICD-10-CM

## 2025-08-01 DIAGNOSIS — Z96.651 S/P TOTAL KNEE ARTHROPLASTY, RIGHT: Primary | ICD-10-CM

## 2025-08-01 PROCEDURE — 97140 MANUAL THERAPY 1/> REGIONS: CPT | Mod: PO

## 2025-08-01 PROCEDURE — 97110 THERAPEUTIC EXERCISES: CPT | Mod: PO

## 2025-08-01 PROCEDURE — 97112 NEUROMUSCULAR REEDUCATION: CPT | Mod: PO

## 2025-08-01 NOTE — PROGRESS NOTES
Outpatient Rehab    Physical Therapy Visit    Patient Name: Deep Farah  MRN: [id number]  YOB: 1950  Encounter Date: 8/1/2025    Therapy Diagnosis:   Encounter Diagnoses   Name Primary?    S/P total knee arthroplasty, right Yes    Primary osteoarthritis of right knee      Physician: Beverly Jackson PA-C    Physician Orders: Eval and Treat  Medical Diagnosis: Primary osteoarthritis of right knee  S/P total knee arthroplasty, right  Postoperative pain  Surgical Diagnosis: Right knee athropolasty   Surgical Date: Not applicable for this Episode  Days Since Last Surgery: Not applicable for this Episode    Visit # / Visits Authorized:  10 / 20  Insurance Authorization Period: 6/23/2025 to 12/31/2025  Date of Evaluation: 6/23/2025  Plan of Care Certification: 6/24/2025 to 9/23/2025      PT/PTA: PT   Number of PTA visits since last PT visit:0  Time In: 1110   Time Out: 1205  Total Time (in minutes): 55   Total Billable Time (in minutes): 55    FOTO:  Intake Score (%): 46  Survey Score 2 (%): Not applicable for this Episode  Survey Score 3 (%): Not applicable for this Episode    Precautions:  Right Lower Extremity Weight-Bearing Status: Full weight-bearing  Left Lower Extremity Weight-Bearing Status: Full weight-bearing      Subjective   Pt reports most pain is associated with knee extension at this time..  Pain reported as 4/10.      Objective            Treatment:  Therapeutic Exercise  TE 1: Quadriceps sets: 10 reps  3 sets  TE 3: Heel slides: 10 reps  3 sets  TE 4: Seated knee extensions:1 x10, 2 times  TE 5: Hamstring stretch: 30-sec hold, 3 reps  TE 6: Supine heel prop 5' 4#  TE 7: bike x10 minutes  Manual Therapy  MT 1: Patellar mobilizations to reduce stiffness  MT 2: Soft tissue massage around the quadriceps and hamstrings to alleviate tightness  MT 3: Passive stretching of hip flexors and hamstrings  MT 4: Knee EXT PROM  Balance/Neuromuscular Re-Education  NMR 1: SAQs 2x10  NMR 2: LAQs 2x10  NMR  4: SLR 15x 0#    Time Entry(in minutes):  Manual Therapy Time Entry: 15  Neuromuscular Re-Education Time Entry: 15  Therapeutic Exercise Time Entry: 25    Assessment & Plan   Assessment: Pt continues to work towards improvement of R knee pain, ROM, and strength at this time. Pt does display moderate discomfort during performance of heel prop stretch in session as well as knee extension PROM. Overall, pt does tolerate treatment well. Pt to continue to progress as tolerance allows.   Evaluation/Treatment Tolerance: Patient limited by pain    The patient will continue to benefit from skilled outpatient physical therapy in order to address the deficits listed in the problem list on the initial evaluation, provide patient and family education, and maximize the patients level of independence in the home and community environments.     The patient's spiritual, cultural, and educational needs were considered, and the patient is agreeable to the plan of care and goals.           Plan: Continue to progress plan of care as tolerated.    Goals:   Active       Long term goals over 12 weeks       Ambulate independently without assisted device greater than 500 feet. (Met)       Start:  06/23/25    Expected End:  09/23/25    Resolved:  07/30/25         Resume all basic and light recreational activities of daily living without pain. (Progressing)       Start:  06/23/25    Expected End:  09/23/25            Improve lower extremity strength to within functional limits (Progressing)       Start:  06/23/25    Expected End:  09/23/25            Return to community activities with independence. (Met)       Start:  06/23/25    Expected End:  09/23/25    Resolved:  07/30/25         . (Progressing)       Start:  06/23/25    Expected End:  09/23/25               short term goals over 6 weeks       Improve ment Knee Range of motion. zero to 110 degrees  (Progressing)       Start:  06/23/25    Expected End:  09/23/25            Increase right  quadriceps strength to 4 / 5.  (Progressing)       Start:  06/23/25    Expected End:  09/23/25            Normalize. Ambulation sequencing without the use of an assistive device  (Progressing)       Start:  06/23/25    Expected End:  09/23/25            Perform set to stand transfers independently.  (Progressing)       Start:  06/23/25    Expected End:  09/23/25                Samuel Guevara, PT

## 2025-08-04 ENCOUNTER — CLINICAL SUPPORT (OUTPATIENT)
Dept: REHABILITATION | Facility: HOSPITAL | Age: 75
End: 2025-08-04
Payer: MEDICARE

## 2025-08-04 DIAGNOSIS — Z96.651 S/P TOTAL KNEE ARTHROPLASTY, RIGHT: Primary | ICD-10-CM

## 2025-08-04 DIAGNOSIS — M17.11 PRIMARY OSTEOARTHRITIS OF RIGHT KNEE: ICD-10-CM

## 2025-08-04 DIAGNOSIS — Z96.651 HISTORY OF TOTAL RIGHT KNEE REPLACEMENT: ICD-10-CM

## 2025-08-04 PROBLEM — G89.18 POSTOPERATIVE PAIN: Status: RESOLVED | Noted: 2025-06-23 | Resolved: 2025-08-04

## 2025-08-04 PROCEDURE — 97140 MANUAL THERAPY 1/> REGIONS: CPT | Mod: PO,CQ

## 2025-08-04 PROCEDURE — 97112 NEUROMUSCULAR REEDUCATION: CPT | Mod: PO,CQ

## 2025-08-04 PROCEDURE — 97110 THERAPEUTIC EXERCISES: CPT | Mod: PO,CQ

## 2025-08-04 NOTE — PROGRESS NOTES
Outpatient Rehab    Physical Therapy Visit    Patient Name: Deep Farah  MRN: [id number]  YOB: 1950  Encounter Date: 8/4/2025    Therapy Diagnosis:   Encounter Diagnoses   Name Primary?    S/P total knee arthroplasty, right Yes    Primary osteoarthritis of right knee     History of total right knee replacement      Physician: Beverly Jackson PA-C    Physician Orders: Eval and Treat  Medical Diagnosis: Primary osteoarthritis of right knee  S/P total knee arthroplasty, right  Postoperative pain  Surgical Diagnosis: Right knee athropolasty   Surgical Date: Not applicable for this Episode  Days Since Last Surgery: Not applicable for this Episode    Visit # / Visits Authorized:  11 / 20  Insurance Authorization Period: 6/23/2025 to 12/31/2025  Date of Evaluation: 6/23/2025  Plan of Care Certification: 6/24/2025 to 9/23/2025      PT/PTA: PTA   Number of PTA visits since last PT visit:1  Time In: 1400   Time Out: 1453  Total Time (in minutes): 53   Total Billable Time (in minutes): 53    FOTO:  Intake Score (%): 46  Survey Score 2 (%): Not applicable for this Episode  Survey Score 3 (%): Not applicable for this Episode    Precautions:  Right Lower Extremity Weight-Bearing Status: Full weight-bearing  Left Lower Extremity Weight-Bearing Status: Full weight-bearing      Subjective   Pt states that he's doing well w/ no new complaints at this time, just the usual knee pain in ext.         Objective            Treatment:  Therapeutic Exercise  TE 1: Quadriceps sets: 10 reps  3 sets  TE 3: Heel slides: 10 reps  3 sets  TE 4: Seated knee extensions:1 x10, 2 times  TE 5: Hamstring stretch: 30-sec hold, 3 reps  TE 6: Supine heel prop 5' 4#  TE 7: bike x10 minutes  Manual Therapy  MT 1: Patellar mobilizations to reduce stiffness  MT 2: Soft tissue massage around the quadriceps and hamstrings to alleviate tightness  MT 3: Passive stretching of hip flexors and hamstrings  MT 4: Knee EXT PROM  Balance/Neuromuscular  Re-Education  NMR 1: SAQs 2x10  NMR 2: LAQs 2x10  NMR 4: SLR 15x 0#    Time Entry(in minutes):  Manual Therapy Time Entry: 15  Neuromuscular Re-Education Time Entry: 15  Therapeutic Exercise Time Entry: 23    Assessment & Plan   Assessment: Pt tolerated therapy session well and completed exercise program w/ goals to improve Knee ROM, LE Strength, and Functional Mobility. Pt still w/ mod discomfort during knee EXT exercises and interventions, however remains amenable to completing said exercises interventions to completion. Min verbal and manual cues needed throughout therapy session to promote proper exercise form. All exercises were tolerated w/ mod fatigue and discomfort. Exercises challenged appropriately. Will continue to progress as tolerated.  Evaluation/Treatment Tolerance: Patient tolerated treatment well    The patient will continue to benefit from skilled outpatient physical therapy in order to address the deficits listed in the problem list on the initial evaluation, provide patient and family education, and maximize the patients level of independence in the home and community environments.     The patient's spiritual, cultural, and educational needs were considered, and the patient is agreeable to the plan of care and goals.           Plan: Continue to progress plan of care as tolerated.    Goals:   Active       Long term goals over 12 weeks       Ambulate independently without assisted device greater than 500 feet. (Met)       Start:  06/23/25    Expected End:  09/23/25    Resolved:  07/30/25         Resume all basic and light recreational activities of daily living without pain. (Progressing)       Start:  06/23/25    Expected End:  09/23/25            Improve lower extremity strength to within functional limits (Progressing)       Start:  06/23/25    Expected End:  09/23/25            Return to community activities with independence. (Met)       Start:  06/23/25    Expected End:  09/23/25     Resolved:  07/30/25         . (Progressing)       Start:  06/23/25    Expected End:  09/23/25               short term goals over 6 weeks       Improve ment Knee Range of motion. zero to 110 degrees  (Progressing)       Start:  06/23/25    Expected End:  09/23/25            Increase right quadriceps strength to 4 / 5.  (Progressing)       Start:  06/23/25    Expected End:  09/23/25            Normalize. Ambulation sequencing without the use of an assistive device  (Progressing)       Start:  06/23/25    Expected End:  09/23/25            Perform set to stand transfers independently.  (Progressing)       Start:  06/23/25    Expected End:  09/23/25                Haim Brito, CARMEN

## 2025-08-08 ENCOUNTER — CLINICAL SUPPORT (OUTPATIENT)
Dept: REHABILITATION | Facility: HOSPITAL | Age: 75
End: 2025-08-08
Payer: MEDICARE

## 2025-08-08 DIAGNOSIS — M17.11 PRIMARY OSTEOARTHRITIS OF RIGHT KNEE: ICD-10-CM

## 2025-08-08 DIAGNOSIS — Z96.651 S/P TOTAL KNEE ARTHROPLASTY, RIGHT: Primary | ICD-10-CM

## 2025-08-08 PROCEDURE — 97112 NEUROMUSCULAR REEDUCATION: CPT | Mod: PO

## 2025-08-08 PROCEDURE — 97140 MANUAL THERAPY 1/> REGIONS: CPT | Mod: PO

## 2025-08-08 NOTE — PROGRESS NOTES
Outpatient Rehab    Physical Therapy Visit    Patient Name: Deep Farah  MRN: [id number]  YOB: 1950  Encounter Date: 8/8/2025    Therapy Diagnosis:   Encounter Diagnoses   Name Primary?    Primary osteoarthritis of right knee     S/P total knee arthroplasty, right Yes     Physician: Beverly Jackson PA-C    Physician Orders: Eval and Treat  Medical Diagnosis: Primary osteoarthritis of right knee  S/P total knee arthroplasty, right  Postoperative pain  Surgical Diagnosis: Right knee athropolasty   Surgical Date: Not applicable for this Episode  Days Since Last Surgery: Not applicable for this Episode    Visit # / Visits Authorized:  12 / 20  Insurance Authorization Period: 6/23/2025 to 12/31/2025  Date of Evaluation: 6/23/2025  Plan of Care Certification: 6/24/2025 to 9/23/2025      PT/PTA: PT   Number of PTA visits since last PT visit:0  Time In: 1115   Time Out: 1202  Total Time (in minutes): 47   Total Billable Time (in minutes): 25    FOTO:  Intake Score (%): 46  Survey Score 2 (%): Not applicable for this Episode  Survey Score 3 (%): Not applicable for this Episode    Precautions:  Right Lower Extremity Weight-Bearing Status: Full weight-bearing  Left Lower Extremity Weight-Bearing Status: Full weight-bearing      Subjective   Pt states he's doing well w/ no new complaints. Minor soreness/aching on inside of the knee..         Objective            Treatment:  Therapeutic Exercise  TE 1: Quadriceps sets w/ heel prop: 10 reps  3 sets  TE 4: Seated knee extensions:1 x10, 3 times w/ 4#  TE 6: Supine heel prop 5' 4#  TE 7: bike x10 minutes Lvl 2  TE 10: seated marching w/ 4# 2x15  TA 1: Pt ed: addressing lack of knee ext, appropriate weight to buy for exercises at home  Manual Therapy  MT 3: Passive stretching of hip flexors and hamstrings  MT 4: Knee EXT PROM  Balance/Neuromuscular Re-Education  NMR 4: SLR 3x10 0#  NMR 5: standing TKE with orange power band x30 and single UE support    Time Entry(in  minutes):  Manual Therapy Time Entry: 12  Neuromuscular Re-Education Time Entry: 8  Therapeutic Exercise Time Entry: 5    Assessment & Plan   Assessment: Pt tolerated session well with min/mod c/o discomfort during passive knee ext. Tx focused on R knee ROM, HS flexibility, motor control and strengthening of RLE. Tx today had heavy emphasis on R knee ext ROM and quad control for improved ext during gait. Pt continues to demonstrate antalgic gait pattern with decreased R knee ext during stance phase and terminal swing. Pt performed standing TKE to improve with min cueing required to perform proper quad contraction while simulating stance phase in gait. Pt reported mm fatigue at conclusion of session, with no increase in pain.    Evaluation/Treatment Tolerance: Patient tolerated treatment well    The patient will continue to benefit from skilled outpatient physical therapy in order to address the deficits listed in the problem list on the initial evaluation, provide patient and family education, and maximize the patients level of independence in the home and community environments.     The patient's spiritual, cultural, and educational needs were considered, and the patient is agreeable to the plan of care and goals.           Plan: Continue to progress plan of care as tolerated.    Goals:   Active       Long term goals over 12 weeks       Ambulate independently without assisted device greater than 500 feet. (Met)       Start:  06/23/25    Expected End:  09/23/25    Resolved:  07/30/25         Resume all basic and light recreational activities of daily living without pain. (Progressing)       Start:  06/23/25    Expected End:  09/23/25            Improve lower extremity strength to within functional limits (Progressing)       Start:  06/23/25    Expected End:  09/23/25            Return to community activities with independence. (Met)       Start:  06/23/25    Expected End:  09/23/25    Resolved:  07/30/25         .  (Progressing)       Start:  06/23/25    Expected End:  09/23/25               short term goals over 6 weeks       Improve ment Knee Range of motion. zero to 110 degrees  (Progressing)       Start:  06/23/25    Expected End:  09/23/25            Increase right quadriceps strength to 4 / 5.  (Progressing)       Start:  06/23/25    Expected End:  09/23/25            Normalize. Ambulation sequencing without the use of an assistive device  (Progressing)       Start:  06/23/25    Expected End:  09/23/25            Perform set to stand transfers independently.  (Progressing)       Start:  06/23/25    Expected End:  09/23/25                Ramo Pagan, PT

## 2025-08-11 ENCOUNTER — CLINICAL SUPPORT (OUTPATIENT)
Dept: REHABILITATION | Facility: HOSPITAL | Age: 75
End: 2025-08-11
Payer: MEDICARE

## 2025-08-11 DIAGNOSIS — Z96.651 S/P TOTAL KNEE ARTHROPLASTY, RIGHT: Primary | ICD-10-CM

## 2025-08-11 DIAGNOSIS — M17.11 PRIMARY OSTEOARTHRITIS OF RIGHT KNEE: ICD-10-CM

## 2025-08-11 PROCEDURE — 97110 THERAPEUTIC EXERCISES: CPT | Mod: PO

## 2025-08-13 ENCOUNTER — CLINICAL SUPPORT (OUTPATIENT)
Dept: REHABILITATION | Facility: HOSPITAL | Age: 75
End: 2025-08-13
Payer: MEDICARE

## 2025-08-13 DIAGNOSIS — M17.11 PRIMARY OSTEOARTHRITIS OF RIGHT KNEE: ICD-10-CM

## 2025-08-13 DIAGNOSIS — Z96.651 S/P TOTAL KNEE ARTHROPLASTY, RIGHT: Primary | ICD-10-CM

## 2025-08-13 PROCEDURE — 97110 THERAPEUTIC EXERCISES: CPT | Mod: PO,CQ

## 2025-08-13 PROCEDURE — 97140 MANUAL THERAPY 1/> REGIONS: CPT | Mod: PO,CQ

## 2025-08-13 PROCEDURE — 97112 NEUROMUSCULAR REEDUCATION: CPT | Mod: PO,CQ

## 2025-08-18 ENCOUNTER — CLINICAL SUPPORT (OUTPATIENT)
Dept: REHABILITATION | Facility: HOSPITAL | Age: 75
End: 2025-08-18
Payer: MEDICARE

## 2025-08-18 DIAGNOSIS — Z96.651 S/P TOTAL KNEE ARTHROPLASTY, RIGHT: Primary | ICD-10-CM

## 2025-08-18 DIAGNOSIS — M17.11 PRIMARY OSTEOARTHRITIS OF RIGHT KNEE: ICD-10-CM

## 2025-08-18 PROCEDURE — 97112 NEUROMUSCULAR REEDUCATION: CPT | Mod: PO

## 2025-08-18 PROCEDURE — 97110 THERAPEUTIC EXERCISES: CPT | Mod: PO

## 2025-08-20 ENCOUNTER — CLINICAL SUPPORT (OUTPATIENT)
Dept: REHABILITATION | Facility: HOSPITAL | Age: 75
End: 2025-08-20
Payer: MEDICARE

## 2025-08-20 DIAGNOSIS — M17.11 PRIMARY OSTEOARTHRITIS OF RIGHT KNEE: ICD-10-CM

## 2025-08-20 DIAGNOSIS — Z96.651 S/P TOTAL KNEE ARTHROPLASTY, RIGHT: Primary | ICD-10-CM

## 2025-08-20 PROCEDURE — 97110 THERAPEUTIC EXERCISES: CPT | Mod: PO

## 2025-08-22 ENCOUNTER — CLINICAL SUPPORT (OUTPATIENT)
Dept: REHABILITATION | Facility: HOSPITAL | Age: 75
End: 2025-08-22
Payer: MEDICARE

## 2025-08-22 DIAGNOSIS — M17.11 PRIMARY OSTEOARTHRITIS OF RIGHT KNEE: ICD-10-CM

## 2025-08-22 DIAGNOSIS — Z96.651 S/P TOTAL KNEE ARTHROPLASTY, RIGHT: Primary | ICD-10-CM

## 2025-08-22 PROCEDURE — 97110 THERAPEUTIC EXERCISES: CPT | Mod: PO

## 2025-08-25 ENCOUNTER — CLINICAL SUPPORT (OUTPATIENT)
Dept: REHABILITATION | Facility: HOSPITAL | Age: 75
End: 2025-08-25
Payer: MEDICARE

## 2025-08-25 DIAGNOSIS — Z96.651 S/P TOTAL KNEE ARTHROPLASTY, RIGHT: Primary | ICD-10-CM

## 2025-08-25 DIAGNOSIS — M17.11 PRIMARY OSTEOARTHRITIS OF RIGHT KNEE: ICD-10-CM

## 2025-08-25 PROCEDURE — 97110 THERAPEUTIC EXERCISES: CPT | Mod: PO

## (undated) DEVICE — SUT CTD VICRYL 1 UND BR CT

## (undated) DEVICE — INTERPULSE SET

## (undated) DEVICE — CAUTERY TIP POLISHER

## (undated) DEVICE — SUT 2/0 36IN COATED VICRYL

## (undated) DEVICE — SOL NACL IRR 3000ML

## (undated) DEVICE — GLOVE BIOGEL SKINSENSE PI 6.0

## (undated) DEVICE — DRESSING AQUACEL SACRAL 9 X 9

## (undated) DEVICE — DRAPE INCISE IOBAN 2 23X23IN

## (undated) DEVICE — BNDG COFLEX FOAM LF2 ST 6X5YD

## (undated) DEVICE — NDL HYPO STD REG BVL 18GX1.5IN

## (undated) DEVICE — BANDAGE ESMARK ELASTIC ST 6X9

## (undated) DEVICE — COVER TABLE HVY DTY 60X90IN

## (undated) DEVICE — BLADE SAW SGTL DL STR 25X1.27X

## (undated) DEVICE — MANIFOLD 4 PORT

## (undated) DEVICE — COVER OVERHEAD SURG LT BLUE

## (undated) DEVICE — PAD PREP CUFFED NS 24X48IN

## (undated) DEVICE — GLOVE BIOGEL PIMICRO INDIC 6.5

## (undated) DEVICE — Device

## (undated) DEVICE — DRESSING AQUACEL AG ADV 3.5X12

## (undated) DEVICE — STAPLER SKIN ROTATING HEAD

## (undated) DEVICE — BLADE 90X13X1.27MM

## (undated) DEVICE — PENCIL ROCKER SWITCH 10FT CORD

## (undated) DEVICE — DRAPE THREE-QTR REINF 53X77IN

## (undated) DEVICE — GLOVE SIGNATURE ESSNTL LTX 8

## (undated) DEVICE — GLOVE SIGNATURE ESSNTL LTX 8.5

## (undated) DEVICE — DRAPE U SPLIT SHEET 54X76IN

## (undated) DEVICE — BLADE SURG #15 CARBON STEEL

## (undated) DEVICE — GOWN POLY REINF X-LONG 2XL

## (undated) DEVICE — DRAPE STERI U-SHAPED 47X51IN

## (undated) DEVICE — HOOD T7 W/ PEEL AWAY LENS

## (undated) DEVICE — ELECTRODE REM PLYHSV RETURN 9

## (undated) DEVICE — SCRUB 10% POVIDONE IODINE 4OZ

## (undated) DEVICE — APPLICATOR CHLORAPREP ORN 26ML